# Patient Record
Sex: MALE | Race: BLACK OR AFRICAN AMERICAN | Employment: OTHER | URBAN - METROPOLITAN AREA
[De-identification: names, ages, dates, MRNs, and addresses within clinical notes are randomized per-mention and may not be internally consistent; named-entity substitution may affect disease eponyms.]

---

## 2017-01-25 RX ORDER — OLMESARTAN/HYDROCHLOROTHIAZIDE 40-12.5 MG
TABLET ORAL
Qty: 90 TABLET | Refills: 3 | Status: SHIPPED | OUTPATIENT
Start: 2017-01-25 | End: 2017-07-17 | Stop reason: SDUPTHER

## 2017-02-16 ENCOUNTER — HOSPITAL ENCOUNTER (OUTPATIENT)
Dept: NON INVASIVE DIAGNOSTICS | Age: 67
Discharge: HOME | End: 2017-02-16
Attending: INTERNAL MEDICINE | Admitting: INTERNAL MEDICINE

## 2017-02-16 DIAGNOSIS — I25.10 ATHEROSCLEROTIC HEART DISEASE OF NATIVE CORONARY ARTERY WITHOUT ANGINA PECTORIS: ICD-10-CM

## 2017-03-01 ENCOUNTER — OFFICE VISIT (OUTPATIENT)
Dept: CARDIOLOGY CLINIC | Age: 67
End: 2017-03-01

## 2017-03-01 VITALS
DIASTOLIC BLOOD PRESSURE: 78 MMHG | OXYGEN SATURATION: 98 % | HEIGHT: 73 IN | HEART RATE: 76 BPM | SYSTOLIC BLOOD PRESSURE: 138 MMHG | WEIGHT: 277 LBS | BODY MASS INDEX: 36.71 KG/M2

## 2017-03-01 DIAGNOSIS — I10 HTN (HYPERTENSION), BENIGN: ICD-10-CM

## 2017-03-01 DIAGNOSIS — I25.10 CORONARY ARTERY DISEASE INVOLVING NATIVE CORONARY ARTERY OF NATIVE HEART WITHOUT ANGINA PECTORIS: Primary | ICD-10-CM

## 2017-03-01 DIAGNOSIS — E78.5 HYPERLIPIDEMIA WITH TARGET LDL LESS THAN 70: ICD-10-CM

## 2017-03-01 PROCEDURE — G8427 DOCREV CUR MEDS BY ELIG CLIN: HCPCS | Performed by: INTERNAL MEDICINE

## 2017-03-01 PROCEDURE — G8598 ASA/ANTIPLAT THER USED: HCPCS | Performed by: INTERNAL MEDICINE

## 2017-03-01 PROCEDURE — 99214 OFFICE O/P EST MOD 30 MIN: CPT | Performed by: INTERNAL MEDICINE

## 2017-03-01 PROCEDURE — 1123F ACP DISCUSS/DSCN MKR DOCD: CPT | Performed by: INTERNAL MEDICINE

## 2017-03-01 PROCEDURE — G8484 FLU IMMUNIZE NO ADMIN: HCPCS | Performed by: INTERNAL MEDICINE

## 2017-03-01 PROCEDURE — 3017F COLORECTAL CA SCREEN DOC REV: CPT | Performed by: INTERNAL MEDICINE

## 2017-03-01 PROCEDURE — 1036F TOBACCO NON-USER: CPT | Performed by: INTERNAL MEDICINE

## 2017-03-01 PROCEDURE — G8417 CALC BMI ABV UP PARAM F/U: HCPCS | Performed by: INTERNAL MEDICINE

## 2017-03-01 PROCEDURE — 4040F PNEUMOC VAC/ADMIN/RCVD: CPT | Performed by: INTERNAL MEDICINE

## 2017-03-01 RX ORDER — ATORVASTATIN CALCIUM 20 MG/1
TABLET, FILM COATED ORAL
Qty: 90 TABLET | Refills: 3 | Status: SHIPPED | OUTPATIENT
Start: 2017-03-01 | End: 2017-04-24 | Stop reason: SDUPTHER

## 2017-03-01 RX ORDER — OLMESARTAN MEDOXOMIL AND HYDROCHLOROTHIAZIDE 40/12.5 40; 12.5 MG/1; MG/1
TABLET ORAL
Qty: 90 TABLET | Refills: 3 | Status: SHIPPED | OUTPATIENT
Start: 2017-03-01 | End: 2017-07-26 | Stop reason: SDUPTHER

## 2017-03-01 RX ORDER — CARVEDILOL 25 MG/1
TABLET ORAL
Qty: 180 TABLET | Refills: 3 | Status: SHIPPED | OUTPATIENT
Start: 2017-03-01 | End: 2017-04-24 | Stop reason: SDUPTHER

## 2017-03-01 RX ORDER — AMLODIPINE BESYLATE 10 MG/1
TABLET ORAL
Qty: 90 TABLET | Refills: 3 | Status: SHIPPED | OUTPATIENT
Start: 2017-03-01 | End: 2018-04-11 | Stop reason: SDUPTHER

## 2017-04-24 RX ORDER — CARVEDILOL 25 MG/1
TABLET ORAL
Qty: 180 TABLET | Refills: 3 | Status: SHIPPED | OUTPATIENT
Start: 2017-04-24 | End: 2018-04-11 | Stop reason: SDUPTHER

## 2017-04-24 RX ORDER — ATORVASTATIN CALCIUM 20 MG/1
TABLET, FILM COATED ORAL
Qty: 90 TABLET | Refills: 3 | Status: SHIPPED | OUTPATIENT
Start: 2017-04-24 | End: 2017-08-09

## 2017-07-17 ENCOUNTER — OFFICE VISIT (OUTPATIENT)
Dept: FAMILY MEDICINE CLINIC | Age: 67
End: 2017-07-17

## 2017-07-17 VITALS
OXYGEN SATURATION: 98 % | RESPIRATION RATE: 16 BRPM | DIASTOLIC BLOOD PRESSURE: 68 MMHG | HEART RATE: 84 BPM | HEIGHT: 73 IN | BODY MASS INDEX: 36.66 KG/M2 | WEIGHT: 276.6 LBS | SYSTOLIC BLOOD PRESSURE: 114 MMHG | TEMPERATURE: 99.5 F

## 2017-07-17 DIAGNOSIS — J01.10 ACUTE NON-RECURRENT FRONTAL SINUSITIS: Primary | ICD-10-CM

## 2017-07-17 PROCEDURE — 3017F COLORECTAL CA SCREEN DOC REV: CPT | Performed by: FAMILY MEDICINE

## 2017-07-17 PROCEDURE — 1123F ACP DISCUSS/DSCN MKR DOCD: CPT | Performed by: FAMILY MEDICINE

## 2017-07-17 PROCEDURE — G8417 CALC BMI ABV UP PARAM F/U: HCPCS | Performed by: FAMILY MEDICINE

## 2017-07-17 PROCEDURE — G8598 ASA/ANTIPLAT THER USED: HCPCS | Performed by: FAMILY MEDICINE

## 2017-07-17 PROCEDURE — G8427 DOCREV CUR MEDS BY ELIG CLIN: HCPCS | Performed by: FAMILY MEDICINE

## 2017-07-17 PROCEDURE — 99214 OFFICE O/P EST MOD 30 MIN: CPT | Performed by: FAMILY MEDICINE

## 2017-07-17 PROCEDURE — 1036F TOBACCO NON-USER: CPT | Performed by: FAMILY MEDICINE

## 2017-07-17 PROCEDURE — 4040F PNEUMOC VAC/ADMIN/RCVD: CPT | Performed by: FAMILY MEDICINE

## 2017-07-17 RX ORDER — AMOXICILLIN AND CLAVULANATE POTASSIUM 875; 125 MG/1; MG/1
1 TABLET, FILM COATED ORAL 2 TIMES DAILY
Qty: 20 TABLET | Refills: 0 | Status: SHIPPED | OUTPATIENT
Start: 2017-07-17 | End: 2017-07-27

## 2017-07-17 ASSESSMENT — ENCOUNTER SYMPTOMS
HEMOPTYSIS: 0
RHINORRHEA: 1
WHEEZING: 0
COUGH: 1
HEARTBURN: 0
SHORTNESS OF BREATH: 0
SORE THROAT: 1

## 2017-07-17 ASSESSMENT — PATIENT HEALTH QUESTIONNAIRE - PHQ9
SUM OF ALL RESPONSES TO PHQ9 QUESTIONS 1 & 2: 0
1. LITTLE INTEREST OR PLEASURE IN DOING THINGS: 0
SUM OF ALL RESPONSES TO PHQ QUESTIONS 1-9: 0
2. FEELING DOWN, DEPRESSED OR HOPELESS: 0

## 2017-07-26 ENCOUNTER — TELEPHONE (OUTPATIENT)
Dept: CARDIOLOGY CLINIC | Age: 67
End: 2017-07-26

## 2017-07-26 RX ORDER — OLMESARTAN MEDOXOMIL AND HYDROCHLOROTHIAZIDE 40/12.5 40; 12.5 MG/1; MG/1
TABLET ORAL
Qty: 90 TABLET | Refills: 3 | Status: SHIPPED | OUTPATIENT
Start: 2017-07-26 | End: 2018-04-11 | Stop reason: SDUPTHER

## 2017-08-24 ENCOUNTER — HOSPITAL ENCOUNTER (OUTPATIENT)
Dept: ENDOSCOPY | Age: 67
Discharge: HOME | End: 2017-08-24
Attending: INTERNAL MEDICINE | Admitting: INTERNAL MEDICINE

## 2017-08-24 VITALS
HEART RATE: 66 BPM | OXYGEN SATURATION: 100 % | SYSTOLIC BLOOD PRESSURE: 131 MMHG | DIASTOLIC BLOOD PRESSURE: 60 MMHG | RESPIRATION RATE: 16 BRPM | WEIGHT: 273.38 LBS | BODY MASS INDEX: 36.23 KG/M2 | HEIGHT: 73 IN | TEMPERATURE: 97 F

## 2017-08-24 LAB
GLUCOSE BLD-MCNC: 107 MG/DL (ref 70–99)
PERFORMED ON: ABNORMAL

## 2017-08-24 RX ORDER — ATORVASTATIN CALCIUM 20 MG/1
20 TABLET, FILM COATED ORAL DAILY
COMMUNITY
End: 2018-05-18 | Stop reason: SDUPTHER

## 2017-08-24 RX ORDER — SODIUM CHLORIDE 9 MG/ML
INJECTION, SOLUTION INTRAVENOUS CONTINUOUS
Status: DISCONTINUED | OUTPATIENT
Start: 2017-08-24 | End: 2017-08-25 | Stop reason: HOSPADM

## 2017-08-24 RX ADMIN — SODIUM CHLORIDE: 9 INJECTION, SOLUTION INTRAVENOUS at 08:06

## 2017-08-24 ASSESSMENT — PAIN SCALES - GENERAL
PAINLEVEL_OUTOF10: 0

## 2017-08-24 ASSESSMENT — PAIN - FUNCTIONAL ASSESSMENT: PAIN_FUNCTIONAL_ASSESSMENT: 0-10

## 2017-09-12 ENCOUNTER — SURG/PROC ORDERS (OUTPATIENT)
Dept: ANESTHESIOLOGY | Age: 67
End: 2017-09-12

## 2017-09-12 RX ORDER — SODIUM CHLORIDE 0.9 % (FLUSH) 0.9 %
10 SYRINGE (ML) INJECTION EVERY 12 HOURS SCHEDULED
Status: CANCELLED | OUTPATIENT
Start: 2017-09-12

## 2017-09-12 RX ORDER — SODIUM CHLORIDE 9 MG/ML
INJECTION, SOLUTION INTRAVENOUS CONTINUOUS
Status: CANCELLED | OUTPATIENT
Start: 2017-09-12

## 2017-09-12 RX ORDER — SODIUM CHLORIDE 0.9 % (FLUSH) 0.9 %
10 SYRINGE (ML) INJECTION PRN
Status: CANCELLED | OUTPATIENT
Start: 2017-09-12

## 2017-09-14 ENCOUNTER — HOSPITAL ENCOUNTER (OUTPATIENT)
Dept: ENDOSCOPY | Age: 67
Discharge: HOME | End: 2017-09-14
Attending: INTERNAL MEDICINE | Admitting: INTERNAL MEDICINE

## 2017-09-14 VITALS
DIASTOLIC BLOOD PRESSURE: 87 MMHG | HEIGHT: 73 IN | WEIGHT: 273 LBS | OXYGEN SATURATION: 100 % | SYSTOLIC BLOOD PRESSURE: 147 MMHG | TEMPERATURE: 97.4 F | HEART RATE: 73 BPM | BODY MASS INDEX: 36.18 KG/M2 | RESPIRATION RATE: 16 BRPM

## 2017-09-14 LAB
GLUCOSE BLD-MCNC: 95 MG/DL (ref 70–99)
PERFORMED ON: NORMAL

## 2017-09-14 RX ORDER — SODIUM CHLORIDE 9 MG/ML
INJECTION, SOLUTION INTRAVENOUS CONTINUOUS
Status: DISCONTINUED | OUTPATIENT
Start: 2017-09-14 | End: 2017-09-15 | Stop reason: HOSPADM

## 2017-09-14 RX ORDER — SODIUM CHLORIDE 0.9 % (FLUSH) 0.9 %
10 SYRINGE (ML) INJECTION PRN
Status: DISCONTINUED | OUTPATIENT
Start: 2017-09-14 | End: 2017-09-15 | Stop reason: HOSPADM

## 2017-09-14 RX ORDER — SODIUM CHLORIDE 0.9 % (FLUSH) 0.9 %
10 SYRINGE (ML) INJECTION EVERY 12 HOURS SCHEDULED
Status: DISCONTINUED | OUTPATIENT
Start: 2017-09-14 | End: 2017-09-15 | Stop reason: HOSPADM

## 2017-09-14 ASSESSMENT — PAIN - FUNCTIONAL ASSESSMENT: PAIN_FUNCTIONAL_ASSESSMENT: 0-10

## 2017-09-29 ENCOUNTER — OFFICE VISIT (OUTPATIENT)
Dept: FAMILY MEDICINE CLINIC | Age: 67
End: 2017-09-29

## 2017-09-29 VITALS
HEIGHT: 73 IN | HEART RATE: 88 BPM | BODY MASS INDEX: 36.37 KG/M2 | SYSTOLIC BLOOD PRESSURE: 162 MMHG | DIASTOLIC BLOOD PRESSURE: 86 MMHG | WEIGHT: 274.4 LBS | RESPIRATION RATE: 16 BRPM

## 2017-09-29 DIAGNOSIS — E55.9 VITAMIN D DEFICIENCY: ICD-10-CM

## 2017-09-29 DIAGNOSIS — R79.89 LOW TESTOSTERONE: Chronic | ICD-10-CM

## 2017-09-29 DIAGNOSIS — D50.8 IRON DEFICIENCY ANEMIA SECONDARY TO INADEQUATE DIETARY IRON INTAKE: ICD-10-CM

## 2017-09-29 DIAGNOSIS — Z23 NEED FOR INFLUENZA VACCINATION: ICD-10-CM

## 2017-09-29 DIAGNOSIS — Z51.81 MEDICATION MONITORING ENCOUNTER: ICD-10-CM

## 2017-09-29 DIAGNOSIS — I25.10 CORONARY ARTERY DISEASE INVOLVING NATIVE CORONARY ARTERY OF NATIVE HEART WITHOUT ANGINA PECTORIS: Chronic | ICD-10-CM

## 2017-09-29 DIAGNOSIS — I10 HTN (HYPERTENSION), BENIGN: Chronic | ICD-10-CM

## 2017-09-29 DIAGNOSIS — R97.20 PSA ELEVATION: Chronic | ICD-10-CM

## 2017-09-29 DIAGNOSIS — Z23 NEED FOR PROPHYLACTIC VACCINATION AGAINST STREPTOCOCCUS PNEUMONIAE (PNEUMOCOCCUS): ICD-10-CM

## 2017-09-29 DIAGNOSIS — R79.83 HOMOCYSTEINEMIA: Chronic | ICD-10-CM

## 2017-09-29 DIAGNOSIS — E78.5 HYPERLIPIDEMIA WITH TARGET LDL LESS THAN 70: Chronic | ICD-10-CM

## 2017-09-29 DIAGNOSIS — E11.21 CONTROLLED TYPE 2 DIABETES MELLITUS WITH MICROALBUMINURIC DIABETIC NEPHROPATHY (HCC): Primary | ICD-10-CM

## 2017-09-29 LAB
CREATININE URINE: 105.8 MG/DL (ref 39–259)
HBA1C MFR BLD: 6.2 %
MICROALBUMIN UR-MCNC: 11.9 MG/DL
MICROALBUMIN/CREAT UR-RTO: 112.5 MG/G (ref 0–30)

## 2017-09-29 PROCEDURE — 1123F ACP DISCUSS/DSCN MKR DOCD: CPT | Performed by: FAMILY MEDICINE

## 2017-09-29 PROCEDURE — G0008 ADMIN INFLUENZA VIRUS VAC: HCPCS | Performed by: FAMILY MEDICINE

## 2017-09-29 PROCEDURE — 3017F COLORECTAL CA SCREEN DOC REV: CPT | Performed by: FAMILY MEDICINE

## 2017-09-29 PROCEDURE — 90662 IIV NO PRSV INCREASED AG IM: CPT | Performed by: FAMILY MEDICINE

## 2017-09-29 PROCEDURE — 3046F HEMOGLOBIN A1C LEVEL >9.0%: CPT | Performed by: FAMILY MEDICINE

## 2017-09-29 PROCEDURE — G8427 DOCREV CUR MEDS BY ELIG CLIN: HCPCS | Performed by: FAMILY MEDICINE

## 2017-09-29 PROCEDURE — 1036F TOBACCO NON-USER: CPT | Performed by: FAMILY MEDICINE

## 2017-09-29 PROCEDURE — G0009 ADMIN PNEUMOCOCCAL VACCINE: HCPCS | Performed by: FAMILY MEDICINE

## 2017-09-29 PROCEDURE — G8598 ASA/ANTIPLAT THER USED: HCPCS | Performed by: FAMILY MEDICINE

## 2017-09-29 PROCEDURE — 4040F PNEUMOC VAC/ADMIN/RCVD: CPT | Performed by: FAMILY MEDICINE

## 2017-09-29 PROCEDURE — 99215 OFFICE O/P EST HI 40 MIN: CPT | Performed by: FAMILY MEDICINE

## 2017-09-29 PROCEDURE — G8417 CALC BMI ABV UP PARAM F/U: HCPCS | Performed by: FAMILY MEDICINE

## 2017-09-29 PROCEDURE — 90670 PCV13 VACCINE IM: CPT | Performed by: FAMILY MEDICINE

## 2017-09-29 PROCEDURE — 83036 HEMOGLOBIN GLYCOSYLATED A1C: CPT | Performed by: FAMILY MEDICINE

## 2017-09-29 ASSESSMENT — ENCOUNTER SYMPTOMS
CHEST TIGHTNESS: 0
WHEEZING: 0
SHORTNESS OF BREATH: 0
COUGH: 0
CHOKING: 0

## 2017-09-29 NOTE — MR AVS SNAPSHOT
After Visit Summary             Colleen Penaloza   2017 1:00 PM   Office Visit    Description:  Male : 1950   Provider:  Hay Roman DO   Department:  [address] and Future Appointments         Below is a list of your follow-up and future appointments. This may not be a complete list as you may have made appointments directly with providers that we are not aware of or your providers may have made some for you. Please call your providers to confirm appointments. It is important to keep your appointments. Please bring your current insurance card, photo ID, co-pay, and all medication bottles to your appointment. If self-pay, payment is expected at the time of service. Your To-Do List     Future Orders Complete By Expires    CBC Auto Differential [FJC5620 Custom]  10/7/2017 (Approximate) 2018    Comprehensive Metabolic Panel [FHT98 Custom]  10/7/2017 (Approximate) 2018    Homocysteine, Serum [LAB93 Custom]  10/7/2017 (Approximate) 2018    Lipid Panel [LAB18 Custom]  10/7/2017 (Approximate) 2018    Magnesium [KDI495 Custom]  10/7/2017 (Approximate) 2018    PSA, Total and Free [IHD901 Custom]  10/7/2017 (Approximate) 2018    Uric Acid [UWB316 Custom]  10/7/2017 (Approximate) 2018    Vitamin D 25 Hydroxy [HOG725 Custom]  10/7/2017 (Approximate) 2018    Microalbumin / creatinine urine ratio [PFW445 Custom]  10/29/2017 2018    Follow-Up    Return in about 3 months (around 2017) for Diabetes, Hypertension.          Information from Your Visit        Department     Name Address Phone Fax    [phone] [address] [phone]      You Were Seen for:         Comments    Type 2 diabetes mellitus without complication, without long-term current use of insulin ([facility]   [[id number]]         Vital Signs Blood Pressure Pulse Respirations Height Weight Body Mass Index    162/86 (Site: Left Arm, Position: Sitting, Cuff Size: Large Adult) 88 16 6' 1\" (1.854 m) 274 lb 6.4 oz (124.5 kg) 36.2 kg/m2    Smoking Status                   Former Smoker           Additional Information about your Body Mass Index (BMI)           Your BMI as listed above is considered obese (30 or more). BMI is an estimate of body fat, calculated from your height and weight. The higher your BMI, the greater your risk of heart disease, high blood pressure, type 2 diabetes, stroke, gallstones, arthritis, sleep apnea, and certain cancers. BMI is not perfect. It may overestimate body fat in athletes and people who are more muscular. Even a small weight loss (between 5 and 10 percent of your current weight) by decreasing your calorie intake and becoming more physically active will help lower your risk of developing or worsening diseases associated with obesity. Learn more at: [employer]          Instructions    Catarino Lovett was seen today for diabetes and other. Diagnoses and all orders for this visit:    Type 2 diabetes mellitus without complication, without long-term current use of insulin ([facility])  -      DIABETES FOOT EXAM  -     Microalbumin / creatinine urine ratio; Future  -     POCT glycosylated hemoglobin (Hb A1C)  -     Lipid Panel; Future  -     Comprehensive Metabolic Panel; Future  -     Good control.  -     Continue meds and lifestyle control.   -     Blood sugar goal is  before a meal.  Less than 180 1-2 hours after a meal.  100-140 at bedtime. HTN (hypertension), benign  -     Comprehensive Metabolic Panel; Future  -     Magnesium; Future  -     Uric Acid; Future  Blood pressure goal for people 75 years and below is 100-119/79 or less. If blood pressure is more than 139/89 for either number then an increase in medicine is indicated.   If you are also diabetic then a blood pressure more 129/79 also means blood pressure medicines should be increased. The doctor should be called if the upper number (systolic blood pressure) is more than 180 once or more than 160 1/3 of the time. Call if the upper number is less than 90 or if less than 100 and the you are light headed when you stand up. Call if the lower number (diastolic blood pressure) is more than 100. A much lower bottom number even in the 40's is not usually urgent. Coronary artery disease involving native coronary artery of native heart without angina pectoris  -     Lipid Panel; Future  -     Comprehensive Metabolic Panel; Future    Hyperlipidemia with target LDL less than 70  -     Lipid Panel; Future  -     Comprehensive Metabolic Panel; Future  -     Homocysteine, Serum; Future  LDL ideal is 69 or less. You can lower your LDL cholesterol by decreasing your saturated fats, eating fewer egg yolks and using egg substitutes, eating smaller portions of red meat and using more skinless poultry and fish to meet your protein needs. Try to get some of your proteins from plant sources such as beans, nuts, peas and / or soy products such as tofu. Adding fruits and vegetables to your diet (a total of 5 servings or more between the two) can help also. Homocysteinemia ([facility] [address]  Recheck. Folic acid 562 mcg 3 pills daily. PSA elevation  Recheck. 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)   urine recheck    Medication monitoring encounter  -     Comprehensive Metabolic Panel;  Future -     Magnesium; Future  -     Uric Acid; Future    Colon  Prep                  Medications and Orders      Your Current Medications Are              atorvastatin (LIPITOR) 20 MG tablet Take 20 mg by mouth daily    olmesartan-hydrochlorothiazide (BENICAR HCT) 40-12.5 MG per tablet TAKE 1 TABLET BY MOUTH ONCE DAILY    carvedilol (COREG) 25 MG tablet TAKE 1 TABLET BY MOUTH TWICE DAILY    amLODIPine (NORVASC) 10 MG tablet TAKE 1 TABLET BY MOUTH ONCE DAILY    ONETOUCH DELICA LANCETS 56S MISC Test blood sugar twice daily. travoprost, benzalkonium, (TRAVATAN) 0.004 % ophthalmic solution Place 1 drop into both eyes nightly. glucose blood VI test strips (ASCENSIA AUTODISC VI;ONE TOUCH ULTRA TEST VI) strip 1 each by In Vitro route 2 times daily. As needed. Lancet Device MISC 1 Device by Does not apply route 2 times daily. Blood Glucose Monitoring Suppl (BLOOD GLUCOSE METER) KIT 1 Device by Does not apply route 2 times daily. aspirin 81 MG tablet Take 81 mg by mouth daily. therapeutic multivitamin-minerals (THERAGRAN-M) tablet Take 1 tablet by mouth daily.       Allergies           No Known Allergies      We Ordered/Performed the following            DIABETES FOOT EXAM     INFLUENZA, HIGH DOSE, 65 YRS +, IM, PF, PREFILL SYR, 0.5ML (FLUZONE HD)     Pneumococcal conjugate vaccine 13-valent PCV13     POCT glycosylated hemoglobin (Hb A1C)          Result Summary for POCT glycosylated hemoglobin (Hb A1C)      Result Information       Status          Normal Final result (Collected: 9/29/2017  2:06 PM)           9/29/2017  2:06 PM      Component Results     Component Value Ref Range & Units Status    Hemoglobin A1C 6.2 % Final               Additional Information        Basic Information     Date Of Birth Sex Race Ethnicity Preferred Language    1950 Male Black Non-/Non  English      Problem List as of 9/29/2017  Date Reviewed: 7/23/2017 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)    Neurofibromatosis (HCC) (Chronic)    Homocysteinemia (HCC) (Chronic)    ED (erectile dysfunction) (Chronic)    DM type 2 (diabetes mellitus, type 2) (HCC) (Chronic)    Vitamin D deficiency    Sensorineural hearing loss, bilateral (Chronic)    Low testosterone (Chronic)    CAD (coronary artery disease) (Chronic)    HTN (hypertension), benign (Chronic)    Hyperlipidemia with target LDL less than 70 (Chronic)    PSA elevation (Chronic)      Immunizations as of 9/29/2017     Name Date    Influenza Virus Vaccine 11/1/2014    Influenza, High Dose 10/13/2016, 12/3/2015      Preventive Care        Date Due    One-time abdominal aortic aneurism (AAA) screening is recommended for all men between the age of 73-68 who have ever smoked 1950    Hepatitis C screening is recommended for all adults regardless of risk factors born between [facility] at least once (lifetime) who have never been tested. 1950    Tetanus Combination Vaccine (1 - Tdap) 5/31/1969    Colonoscopy 5/31/2000    Pneumococcal Vaccines (two) for all adults aged 72 and over (1 of 2 - PCV13) 5/31/2015    Diabetic Foot Exam 8/18/2015    Eye Exam By An Eye Doctor 2/9/2016    Hemoglobin A1C (Test For Long-Term Glucose Control) 3/17/2017    Yearly Flu Vaccine (1) 9/1/2017    Cholesterol Screening 9/3/2017            Twistt Signup           Our records indicate that you have an active [employer] account. You can view your After Visit Summary by going to [email or web address] and logging in with your [employer] username and password. If you don't have a [employer] username and password but a parent or guardian has access to your record, the parent or guardian should login with their own [employer] username and password and access your record to view the After Visit Summary.      Additional Information

## 2017-09-29 NOTE — PROGRESS NOTES
Subjective:      Patient ID: Saskia Keller is a 79 y.o. male. Chief Complaint   Patient presents with    Diabetes     FOLLOW UP    Other     DISCUSS COLONOSCOPY     HPI  COLONOSCOPY   Had 2 bottle prep he had colonoscopy but was not cleaned out. Different prep next time but had a hard time getting prep down. Had an IV in upper arm. The IV came out and they couldn't get one in after multiple tries. Then they wanted to go into the jugular. He finally said no. He is asking if he should go somewhere else for colonoscopy. DM   -130 with occasional spike to 150. Takes 1 in am and is lower. Higher in evening if after a meal at 160. HTN  Measures BP in spurts. Not adding salt. Eats out 2x/wk. Chips etc 2x/wk. Drinks Gatorade a lot. Current Outpatient Prescriptions on File Prior to Visit   Medication Sig Dispense Refill    atorvastatin (LIPITOR) 20 MG tablet Take 20 mg by mouth daily      olmesartan-hydrochlorothiazide (BENICAR HCT) 40-12.5 MG per tablet TAKE 1 TABLET BY MOUTH ONCE DAILY 90 tablet 3    carvedilol (COREG) 25 MG tablet TAKE 1 TABLET BY MOUTH TWICE DAILY 180 tablet 3    amLODIPine (NORVASC) 10 MG tablet TAKE 1 TABLET BY MOUTH ONCE DAILY 90 tablet 3    ONETOUCH DELICA LANCETS 39R MISC Test blood sugar twice daily. 3    travoprost, benzalkonium, (TRAVATAN) 0.004 % ophthalmic solution Place 1 drop into both eyes nightly.  glucose blood VI test strips (ASCENSIA AUTODISC VI;ONE TOUCH ULTRA TEST VI) strip 1 each by In Vitro route 2 times daily. As needed. 100 each 3    Lancet Device MISC 1 Device by Does not apply route 2 times daily. 100 Device 3    Blood Glucose Monitoring Suppl (BLOOD GLUCOSE METER) KIT 1 Device by Does not apply route 2 times daily. 1 kit 0    aspirin 81 MG tablet Take 81 mg by mouth daily.  therapeutic multivitamin-minerals (THERAGRAN-M) tablet Take 1 tablet by mouth daily. No current facility-administered medications on file prior to visit. Review of Systems   Respiratory: Negative for cough, choking, chest tightness, shortness of breath and wheezing. Cardiovascular: Negative for chest pain, palpitations and leg swelling. Neurological: Negative for dizziness, light-headedness and headaches. Objective:   Physical Exam   Constitutional: He appears well-developed. No distress. Eyes: Conjunctivae are normal. Right eye exhibits no discharge. Left eye exhibits no discharge. No scleral icterus. Neck: Trachea normal and phonation normal. Neck supple. No JVD present. No tracheal tenderness present. Carotid bruit is not present. No tracheal deviation present. No thyroid mass and no thyromegaly present. Cardiovascular: Normal rate, regular rhythm, S1 normal, S2 normal and normal heart sounds. Exam reveals no gallop, no S3, no S4, no distant heart sounds and no friction rub. No murmur heard. Pulmonary/Chest: Effort normal and breath sounds normal. No stridor. No respiratory distress. He has no decreased breath sounds. He has no wheezes. He has no rhonchi. He has no rales. Musculoskeletal:        Feet:    Foot sensory:  Right  5/10  (absent first and fifth toes medial forefoot, medial and middle heel - patient has a right thigh neurofibroma which affects feeling in his feet), left  10/10 ( ). DP 3/5 bilateral.  PT 3/5 bilateral.  Callus: Mild both plantar heels, bilateral medial first MTP, left fifth lateral corner. Sores:  None. Onychomycosis:  None. Tinea pedis: Right plantar. Deformity: None     Lymphadenopathy:        Head (right side): No submandibular and no tonsillar adenopathy present. Head (left side): No submandibular and no tonsillar adenopathy present. He has no cervical adenopathy. Right cervical: No superficial cervical, no deep cervical and no posterior cervical adenopathy present. Left cervical: No superficial cervical, no deep cervical and no posterior cervical adenopathy present. Appointment Time     Claus Lopez was seen today for diabetes and other. Diagnoses and all orders for this visit:    Type 2 diabetes mellitus without complication, without long-term current use of insulin ([facility])  -      DIABETES FOOT EXAM  -     Microalbumin / creatinine urine ratio; Future  -     POCT glycosylated hemoglobin (Hb A1C)  -     Lipid Panel; Future  -     Comprehensive Metabolic Panel; Future  -     Good control.  -     Continue meds and lifestyle control.   -     Blood sugar goal is  before a meal.  Less than 180 1-2 hours after a meal.  100-140 at bedtime. HTN (hypertension), benign  -     Comprehensive Metabolic Panel; Future  -     Magnesium; Future  -     Uric Acid; Future  Blood pressure goal for people 75 years and below is 100-119/79 or less. If blood pressure is more than 139/89 for either number then an increase in medicine is indicated. If you are also diabetic then a blood pressure more 129/79 also means blood pressure medicines should be increased. The doctor should be called if the upper number (systolic blood pressure) is more than 180 once or more than 160 1/3 of the time. Call if the upper number is less than 90 or if less than 100 and the you are light headed when you stand up. Call if the lower number (diastolic blood pressure) is more than 100. A much lower bottom number even in the 40's is not usually urgent. Coronary artery disease involving native coronary artery of native heart without angina pectoris  -     Lipid Panel; Future  -     Comprehensive Metabolic Panel; Future    Hyperlipidemia with target LDL less than 70  -     Lipid Panel; Future  -     Comprehensive Metabolic Panel; Future  -     Homocysteine, Serum; Future  LDL ideal is 69 or less.  You can lower your LDL cholesterol by decreasing your saturated fats, eating fewer egg yolks and using egg substitutes, eating smaller portions of red meat and using more skinless poultry and fish to meet your protein

## 2017-10-07 PROBLEM — E11.21 DIABETES MELLITUS WITH MICROALBUMINURIC DIABETIC NEPHROPATHY (HCC): Chronic | Status: ACTIVE | Noted: 2017-09-29

## 2017-12-13 NOTE — PROGRESS NOTES
[employer] MESSAGE SENT TO PT, PT DUE FOR BLOOD WORK.   [facility]

## 2017-12-13 NOTE — PROGRESS NOTES
[employer] MESSAGE SENT TO PT, PT DUE FOR BLOOD WORK.   [facility]

## 2017-12-13 NOTE — PROGRESS NOTES
[employer] MESSAGE SENT TO PT, PT DUE FOR BLOOD WORK.   [facility]

## 2017-12-13 NOTE — PROGRESS NOTES
[employer] MESSAGE SENT TO PT, PT DUE FOR BLOOD WORK.   [facility]

## 2018-04-11 ENCOUNTER — OFFICE VISIT (OUTPATIENT)
Dept: CARDIOLOGY CLINIC | Age: 68
End: 2018-04-11

## 2018-04-11 VITALS
SYSTOLIC BLOOD PRESSURE: 140 MMHG | HEIGHT: 73 IN | WEIGHT: 282 LBS | BODY MASS INDEX: 37.37 KG/M2 | HEART RATE: 70 BPM | DIASTOLIC BLOOD PRESSURE: 70 MMHG

## 2018-04-11 DIAGNOSIS — I25.10 CORONARY ARTERY DISEASE INVOLVING NATIVE CORONARY ARTERY OF NATIVE HEART WITHOUT ANGINA PECTORIS: Primary | Chronic | ICD-10-CM

## 2018-04-11 DIAGNOSIS — E78.5 HYPERLIPIDEMIA WITH TARGET LDL LESS THAN 70: Chronic | ICD-10-CM

## 2018-04-11 DIAGNOSIS — I10 HTN (HYPERTENSION), BENIGN: Chronic | ICD-10-CM

## 2018-04-11 PROCEDURE — G8598 ASA/ANTIPLAT THER USED: HCPCS | Performed by: INTERNAL MEDICINE

## 2018-04-11 PROCEDURE — G8417 CALC BMI ABV UP PARAM F/U: HCPCS | Performed by: INTERNAL MEDICINE

## 2018-04-11 PROCEDURE — 4040F PNEUMOC VAC/ADMIN/RCVD: CPT | Performed by: INTERNAL MEDICINE

## 2018-04-11 PROCEDURE — 1036F TOBACCO NON-USER: CPT | Performed by: INTERNAL MEDICINE

## 2018-04-11 PROCEDURE — G8427 DOCREV CUR MEDS BY ELIG CLIN: HCPCS | Performed by: INTERNAL MEDICINE

## 2018-04-11 PROCEDURE — 99214 OFFICE O/P EST MOD 30 MIN: CPT | Performed by: INTERNAL MEDICINE

## 2018-04-11 PROCEDURE — 1123F ACP DISCUSS/DSCN MKR DOCD: CPT | Performed by: INTERNAL MEDICINE

## 2018-04-11 PROCEDURE — 3017F COLORECTAL CA SCREEN DOC REV: CPT | Performed by: INTERNAL MEDICINE

## 2018-04-11 RX ORDER — DOXAZOSIN MESYLATE 4 MG/1
4 TABLET ORAL NIGHTLY
Qty: 90 TABLET | Refills: 3 | Status: SHIPPED | OUTPATIENT
Start: 2018-04-11 | End: 2018-06-06 | Stop reason: SDUPTHER

## 2018-04-11 RX ORDER — OLMESARTAN MEDOXOMIL AND HYDROCHLOROTHIAZIDE 40/12.5 40; 12.5 MG/1; MG/1
TABLET ORAL
Qty: 90 TABLET | Refills: 3 | Status: SHIPPED | OUTPATIENT
Start: 2018-04-11 | End: 2019-02-12

## 2018-04-11 RX ORDER — DOXAZOSIN MESYLATE 4 MG/1
4 TABLET ORAL NIGHTLY
Qty: 90 TABLET | Refills: 3 | Status: SHIPPED | OUTPATIENT
Start: 2018-04-11 | End: 2018-04-11 | Stop reason: SDUPTHER

## 2018-04-11 RX ORDER — AMLODIPINE BESYLATE 10 MG/1
TABLET ORAL
Qty: 90 TABLET | Refills: 3 | Status: SHIPPED | OUTPATIENT
Start: 2018-04-11 | End: 2019-05-24 | Stop reason: SDUPTHER

## 2018-04-11 RX ORDER — CARVEDILOL 25 MG/1
TABLET ORAL
Qty: 180 TABLET | Refills: 3 | Status: SHIPPED | OUTPATIENT
Start: 2018-04-11 | End: 2018-05-07 | Stop reason: SDUPTHER

## 2018-05-07 RX ORDER — CARVEDILOL 25 MG/1
TABLET ORAL
Qty: 180 TABLET | Refills: 3 | Status: SHIPPED | OUTPATIENT
Start: 2018-05-07 | End: 2019-05-24 | Stop reason: SDUPTHER

## 2018-05-16 ENCOUNTER — HOSPITAL ENCOUNTER (OUTPATIENT)
Dept: OTHER | Age: 68
Discharge: HOME | End: 2018-05-16
Attending: INTERNAL MEDICINE | Admitting: INTERNAL MEDICINE

## 2018-05-16 DIAGNOSIS — E78.5 HYPERLIPIDEMIA WITH TARGET LDL LESS THAN 70: Chronic | ICD-10-CM

## 2018-05-16 LAB
A/G RATIO: 1.5 (ref 1.1–2.2)
ALBUMIN SERPL-MCNC: 4.4 G/DL (ref 3.4–5)
ALP BLD-CCNC: 45 U/L (ref 40–129)
ALT SERPL-CCNC: 18 U/L (ref 10–40)
ANION GAP SERPL CALCULATED.3IONS-SCNC: 14 MMOL/L (ref 3–16)
AST SERPL-CCNC: 16 U/L (ref 15–37)
BILIRUB SERPL-MCNC: 1 MG/DL (ref 0–1)
BUN BLDV-MCNC: 13 MG/DL (ref 7–20)
CALCIUM SERPL-MCNC: 9.5 MG/DL (ref 8.3–10.6)
CHLORIDE BLD-SCNC: 104 MMOL/L (ref 99–110)
CHOLESTEROL, TOTAL: 162 MG/DL (ref 0–199)
CO2: 26 MMOL/L (ref 21–32)
CREAT SERPL-MCNC: 1.1 MG/DL (ref 0.8–1.3)
GFR AFRICAN AMERICAN: >60
GFR NON-AFRICAN AMERICAN: >60
GLOBULIN: 3 G/DL
GLUCOSE BLD-MCNC: 114 MG/DL (ref 70–99)
HDLC SERPL-MCNC: 50 MG/DL (ref 40–60)
LDL CHOLESTEROL CALCULATED: 90 MG/DL
POTASSIUM SERPL-SCNC: 3.9 MMOL/L (ref 3.5–5.1)
SODIUM BLD-SCNC: 144 MMOL/L (ref 136–145)
TOTAL PROTEIN: 7.4 G/DL (ref 6.4–8.2)
TRIGL SERPL-MCNC: 110 MG/DL (ref 0–150)
VLDLC SERPL CALC-MCNC: 22 MG/DL

## 2018-05-18 RX ORDER — ATORVASTATIN CALCIUM 20 MG/1
20 TABLET, FILM COATED ORAL DAILY
Qty: 30 TABLET | Refills: 11 | Status: SHIPPED | OUTPATIENT
Start: 2018-05-18 | End: 2019-05-02 | Stop reason: SDUPTHER

## 2018-06-06 ENCOUNTER — TELEPHONE (OUTPATIENT)
Dept: CARDIOLOGY CLINIC | Age: 68
End: 2018-06-06

## 2018-06-06 RX ORDER — DOXAZOSIN 8 MG/1
8 TABLET ORAL NIGHTLY
Qty: 90 TABLET | Refills: 3
Start: 2018-06-06 | End: 2018-06-27 | Stop reason: SDUPTHER

## 2018-06-08 ENCOUNTER — OFFICE VISIT (OUTPATIENT)
Dept: CARDIOLOGY CLINIC | Age: 68
End: 2018-06-08

## 2018-06-08 VITALS
DIASTOLIC BLOOD PRESSURE: 88 MMHG | SYSTOLIC BLOOD PRESSURE: 132 MMHG | BODY MASS INDEX: 37.47 KG/M2 | HEART RATE: 68 BPM | WEIGHT: 284 LBS | OXYGEN SATURATION: 98 %

## 2018-06-08 DIAGNOSIS — E11.9 TYPE 2 DIABETES MELLITUS WITHOUT COMPLICATION, WITHOUT LONG-TERM CURRENT USE OF INSULIN (HCC): ICD-10-CM

## 2018-06-08 DIAGNOSIS — I10 HTN (HYPERTENSION), BENIGN: Primary | Chronic | ICD-10-CM

## 2018-06-08 DIAGNOSIS — E78.5 HYPERLIPIDEMIA WITH TARGET LDL LESS THAN 70: Chronic | ICD-10-CM

## 2018-06-08 DIAGNOSIS — I25.10 CORONARY ARTERY DISEASE INVOLVING NATIVE CORONARY ARTERY OF NATIVE HEART WITHOUT ANGINA PECTORIS: Chronic | ICD-10-CM

## 2018-06-08 PROCEDURE — 3017F COLORECTAL CA SCREEN DOC REV: CPT | Performed by: NURSE PRACTITIONER

## 2018-06-08 PROCEDURE — G8417 CALC BMI ABV UP PARAM F/U: HCPCS | Performed by: NURSE PRACTITIONER

## 2018-06-08 PROCEDURE — 2022F DILAT RTA XM EVC RTNOPTHY: CPT | Performed by: NURSE PRACTITIONER

## 2018-06-08 PROCEDURE — 1036F TOBACCO NON-USER: CPT | Performed by: NURSE PRACTITIONER

## 2018-06-08 PROCEDURE — 1123F ACP DISCUSS/DSCN MKR DOCD: CPT | Performed by: NURSE PRACTITIONER

## 2018-06-08 PROCEDURE — G8598 ASA/ANTIPLAT THER USED: HCPCS | Performed by: NURSE PRACTITIONER

## 2018-06-08 PROCEDURE — 4040F PNEUMOC VAC/ADMIN/RCVD: CPT | Performed by: NURSE PRACTITIONER

## 2018-06-08 PROCEDURE — 99214 OFFICE O/P EST MOD 30 MIN: CPT | Performed by: NURSE PRACTITIONER

## 2018-06-08 PROCEDURE — G8427 DOCREV CUR MEDS BY ELIG CLIN: HCPCS | Performed by: NURSE PRACTITIONER

## 2018-06-08 PROCEDURE — 3046F HEMOGLOBIN A1C LEVEL >9.0%: CPT | Performed by: NURSE PRACTITIONER

## 2018-06-27 RX ORDER — DOXAZOSIN 8 MG/1
8 TABLET ORAL NIGHTLY
Qty: 90 TABLET | Refills: 3 | Status: SHIPPED | OUTPATIENT
Start: 2018-06-27 | End: 2019-07-19 | Stop reason: SDUPTHER

## 2018-07-09 ENCOUNTER — OFFICE VISIT (OUTPATIENT)
Dept: FAMILY MEDICINE CLINIC | Age: 68
End: 2018-07-09

## 2018-07-09 VITALS
HEIGHT: 73 IN | HEART RATE: 60 BPM | WEIGHT: 284.8 LBS | DIASTOLIC BLOOD PRESSURE: 80 MMHG | OXYGEN SATURATION: 98 % | SYSTOLIC BLOOD PRESSURE: 136 MMHG | RESPIRATION RATE: 20 BRPM | BODY MASS INDEX: 37.74 KG/M2

## 2018-07-09 DIAGNOSIS — R60.0 EDEMA OF BOTH LEGS: Primary | ICD-10-CM

## 2018-07-09 DIAGNOSIS — I10 HTN (HYPERTENSION), BENIGN: Chronic | ICD-10-CM

## 2018-07-09 DIAGNOSIS — R79.83 HOMOCYSTEINEMIA: ICD-10-CM

## 2018-07-09 DIAGNOSIS — Z12.11 ENCOUNTER FOR SCREENING COLONOSCOPY: ICD-10-CM

## 2018-07-09 DIAGNOSIS — E11.21 DIABETES MELLITUS WITH MICROALBUMINURIC DIABETIC NEPHROPATHY (HCC): ICD-10-CM

## 2018-07-09 DIAGNOSIS — E11.9 TYPE 2 DIABETES MELLITUS WITHOUT COMPLICATION, WITHOUT LONG-TERM CURRENT USE OF INSULIN (HCC): Chronic | ICD-10-CM

## 2018-07-09 PROCEDURE — 3017F COLORECTAL CA SCREEN DOC REV: CPT | Performed by: FAMILY MEDICINE

## 2018-07-09 PROCEDURE — 4040F PNEUMOC VAC/ADMIN/RCVD: CPT | Performed by: FAMILY MEDICINE

## 2018-07-09 PROCEDURE — 2022F DILAT RTA XM EVC RTNOPTHY: CPT | Performed by: FAMILY MEDICINE

## 2018-07-09 PROCEDURE — 99215 OFFICE O/P EST HI 40 MIN: CPT | Performed by: FAMILY MEDICINE

## 2018-07-09 PROCEDURE — 1123F ACP DISCUSS/DSCN MKR DOCD: CPT | Performed by: FAMILY MEDICINE

## 2018-07-09 PROCEDURE — 1101F PT FALLS ASSESS-DOCD LE1/YR: CPT | Performed by: FAMILY MEDICINE

## 2018-07-09 PROCEDURE — G8598 ASA/ANTIPLAT THER USED: HCPCS | Performed by: FAMILY MEDICINE

## 2018-07-09 PROCEDURE — G8427 DOCREV CUR MEDS BY ELIG CLIN: HCPCS | Performed by: FAMILY MEDICINE

## 2018-07-09 PROCEDURE — 1036F TOBACCO NON-USER: CPT | Performed by: FAMILY MEDICINE

## 2018-07-09 PROCEDURE — 3046F HEMOGLOBIN A1C LEVEL >9.0%: CPT | Performed by: FAMILY MEDICINE

## 2018-07-09 PROCEDURE — G8417 CALC BMI ABV UP PARAM F/U: HCPCS | Performed by: FAMILY MEDICINE

## 2018-07-09 ASSESSMENT — PATIENT HEALTH QUESTIONNAIRE - PHQ9
SUM OF ALL RESPONSES TO PHQ9 QUESTIONS 1 & 2: 0
SUM OF ALL RESPONSES TO PHQ QUESTIONS 1-9: 0
1. LITTLE INTEREST OR PLEASURE IN DOING THINGS: 0
2. FEELING DOWN, DEPRESSED OR HOPELESS: 0

## 2018-07-09 ASSESSMENT — ENCOUNTER SYMPTOMS
CHOKING: 0
WHEEZING: 0
SHORTNESS OF BREATH: 1
COUGH: 0
APNEA: 1
CHEST TIGHTNESS: 0

## 2018-07-09 NOTE — PROGRESS NOTES
Neurological: He is alert. Reflex Scores:       Patellar reflexes are 2+ on the right side and 2+ on the left side. Skin: Skin is warm and dry. He is not diaphoretic. No pallor. Psychiatric: He has a normal mood and affect. His speech is normal and behavior is normal. Judgment normal.   Nursing note and vitals reviewed. Vitals:    07/09/18 0856   BP: 136/80   Site: Right Arm   Position: Sitting   Cuff Size: Large Adult   Pulse: 60   Resp: 20   SpO2: 98%   Weight: 284 lb 12.8 oz (129.2 kg)  Comment: EDUAR SHOES   Height: 6' 1\" (1.854 m)     BP Readings from Last 3 Encounters:   07/09/18 136/80   06/08/18 132/88   04/11/18 (!) 140/70     Pulse Readings from Last 3 Encounters:   07/09/18 60   06/08/18 68   04/11/18 70     Wt Readings from Last 3 Encounters:   07/09/18 284 lb 12.8 oz (129.2 kg)   06/08/18 284 lb (128.8 kg)   04/11/18 282 lb (127.9 kg)     Body mass index is 37.57 kg/m². 5/16/2018 07:53   Sodium 144   Potassium 3.9   Chloride 104   CO2 26   BUN 13   Creatinine 1.1   Anion Gap 14   GFR African American >60   Glucose 114 (H)   Calcium 9.5   Total Protein 7.4   Cholesterol, Total 162   HDL Cholesterol 50   LDL Calculated 90   Triglycerides 110   VLDL Cholesterol Calculated 22   Albumin 4.4   Globulin 3.0   Albumin/Globulin Ratio 1.5   Alk Phos 45   ALT 18   AST 16   Bilirubin 1.0     Assessment:      1. Edema of both legs    2. Type 2 diabetes mellitus without complication, without long-term current use of insulin ([address]    3. Diabetes mellitus with microalbuminuric diabetic nephropathy ([address]    4. Homocysteinemia ([address]    5. HTN (hypertension), benign    6. Encounter for screening colonoscopy          Plan:      - Counseling More Than 50% of the 40 min Appointment Time     Shea Conway was seen today for joint swelling. Diagnoses and all orders for this visit:    Edema of both legs  Avoid all salt. Keep leg elevated when sitting. Wear compression stockings as much as possible.   If feet are down

## 2018-07-09 NOTE — PATIENT INSTRUCTIONS
Skylar Cowart was seen today for joint swelling. Diagnoses and all orders for this visit:    Edema of both legs  Avoid all salt. Keep leg elevated when sitting. Wear compression stockings as much as possible. If feet are down while sitting press toes and front of foot down then lift them firmly with heel on the ground. When standing gently rock from heels to standing on toe tips. These are calf pumps that move fluid out of the calf and back to the central circulation and prevent worsened swelling in the foot and calf. Take Amlodipine at dinner or bedtime. Type 2 diabetes mellitus without complication, without long-term current use of insulin ([facility])  Schedule to discuss. Diabetes mellitus with microalbuminuric diabetic nephropathy ([facility]  9/29/18 due for test.    Homocysteinemia ([facility]  Your homocysteine is high because you are not absorbing or processing enough folic acid - a B vitamin. Lowering homocysteine was associated with a 25% decrease in strokes in the HOPE 2 trial,  lowers risk of progression to Alzheimer's dementia and blood clots and improves bone health. Please start folic acid 980 mcg per day. HTN (hypertension), benign  Blood pressure goal for people 75 years and below is 100-119/79 or less. If you are also diabetic then a blood pressure more 129/79 also means blood pressure medicines should be increased. The doctor should be called if the upper number (systolic blood pressure) is more than 180 once or more than 160 1/3 of the time. Call if the upper number is less than 90 or if less than 100 and the you are light headed when you stand up. Call if the lower number (diastolic blood pressure) is more than 100. A much lower bottom number even in the 40's is not usually urgent. BP is likely the cause of the microalbuminuria.     Encounter for screening colonoscopy  -     Ambulatory referral to Colonoscopy    Patient Education   Low Sodium Diet (2,000 Milligram): Care Instructions  Your are getting. · Buy fresh vegetables, or frozen vegetables without added sauces. Buy low-sodium versions of canned vegetables, soups, and other canned goods. Prepare low-sodium meals  · Cut back on the amount of salt you use in cooking. This will help you adjust to the taste. Do not add salt after cooking. One teaspoon of salt has about 2,300 mg of sodium. · Take the salt shaker off the table. · Flavor your food with garlic, lemon juice, onion, vinegar, herbs, and spices. Do not use soy sauce, lite soy sauce, steak sauce, onion salt, garlic salt, celery salt, mustard, or ketchup on your food. · Use low-sodium salad dressings, sauces, and ketchup. Or make your own salad dressings and sauces without adding salt. · Use less salt (or none) when recipes call for it. You can often use half the salt a recipe calls for without losing flavor. Other foods such as rice, pasta, and grains do not need added salt. · Rinse canned vegetables, and cook them in fresh water. This removes some-but not all-of the salt. · Avoid water that is naturally high in sodium or that has been treated with water softeners, which add sodium. Call your local water company to find out the sodium content of your water supply. If you buy bottled water, read the label and choose a sodium-free brand. Avoid high-sodium foods  · Avoid eating:  ¨ Smoked, cured, salted, and canned meat, fish, and poultry. ¨ Ham, jackson, hot dogs, and luncheon meats. ¨ Regular, hard, and processed cheese and regular peanut butter. ¨ Crackers with salted tops, and other salted snack foods such as pretzels, chips, and salted popcorn. ¨ Frozen prepared meals, unless labeled low-sodium. ¨ Canned and dried soups, broths, and bouillon, unless labeled sodium-free or low-sodium. ¨ Canned vegetables, unless labeled sodium-free or low-sodium. ¨ Western Kathy fries, pizza, tacos, and other fast foods.   ¨ Pickles, olives, ketchup, and other condiments, especially soy sauce, unless

## 2018-07-17 ENCOUNTER — TELEPHONE (OUTPATIENT)
Dept: SURGERY | Age: 68
End: 2018-07-17

## 2018-07-17 NOTE — TELEPHONE ENCOUNTER
Spoke with patient. Colonoscopy scheduled. Dr. Emma Mai  Location: [facility]  Date: 8/3/2018  Time: 11:30 a.m. Arrival time: 10:00 a.m. Heart/Lung/Kidney: Hx of CAD  Blood thinners: aspirin 81mg  Pharmacy: TBD  Instruction preference: Mail to home address (address verified)  Insurance: Medicare (primary) + [employer] (secondary)    F[facility]ed [facility] order to [facility] Mailed instructions to patient's home address. Waiting to hear from patient about which pharmacy he prefers. No need to call insurance companies. [employer] follows Medicare guidelines.

## 2018-07-18 RX ORDER — SODIUM CHLORIDE 0.9 % (FLUSH) 0.9 %
10 SYRINGE (ML) INJECTION PRN
Status: CANCELLED | OUTPATIENT
Start: 2018-07-18

## 2018-07-18 RX ORDER — SODIUM CHLORIDE 9 MG/ML
INJECTION, SOLUTION INTRAVENOUS CONTINUOUS
Status: CANCELLED | OUTPATIENT
Start: 2018-07-18

## 2018-07-18 RX ORDER — SODIUM CHLORIDE 0.9 % (FLUSH) 0.9 %
10 SYRINGE (ML) INJECTION EVERY 12 HOURS SCHEDULED
Status: CANCELLED | OUTPATIENT
Start: 2018-07-18

## 2018-07-23 DIAGNOSIS — Z12.11 SCREENING FOR COLON CANCER: Primary | ICD-10-CM

## 2018-07-26 NOTE — TELEPHONE ENCOUNTER
[facility] and changed colonoscopy to 8/31/18 at 8:00 a.m. arrival time 6:30 a.m. Marked changes on surgery order and faxed to [facility]. LMOM at [facility] that Rx could be re-shelved since patient's procedure changed to the end of the month. Sent reminder message to myself to send Rx to Dr. Amirah Patrick to sign. (Patient wants it sent to [facility].) Updated instructions and mailed to patient's home address.

## 2018-08-03 ENCOUNTER — HOSPITAL ENCOUNTER (OUTPATIENT)
Dept: ENDOSCOPY | Age: 68
Discharge: HOME | End: 2018-08-04
Attending: SURGERY | Admitting: SURGERY

## 2018-08-20 ENCOUNTER — PATIENT MESSAGE (OUTPATIENT)
Dept: OTHER | Facility: CLINIC | Age: 68
End: 2018-08-20

## 2018-08-21 DIAGNOSIS — Z12.11 SCREENING FOR COLON CANCER: Primary | ICD-10-CM

## 2018-08-29 ENCOUNTER — TELEPHONE (OUTPATIENT)
Dept: SURGERY | Age: 68
End: 2018-08-29

## 2018-08-31 ENCOUNTER — HOSPITAL ENCOUNTER (OUTPATIENT)
Dept: ENDOSCOPY | Age: 68
Discharge: HOME | End: 2018-08-31
Admitting: SURGERY

## 2018-08-31 VITALS
SYSTOLIC BLOOD PRESSURE: 153 MMHG | RESPIRATION RATE: 15 BRPM | HEART RATE: 69 BPM | BODY MASS INDEX: 36.45 KG/M2 | DIASTOLIC BLOOD PRESSURE: 87 MMHG | OXYGEN SATURATION: 100 % | WEIGHT: 275 LBS | TEMPERATURE: 97 F | HEIGHT: 73 IN

## 2018-08-31 LAB
GLUCOSE BLD-MCNC: 107 MG/DL (ref 70–99)
GLUCOSE BLD-MCNC: 98 MG/DL (ref 70–99)
PERFORMED ON: ABNORMAL
PERFORMED ON: NORMAL

## 2018-08-31 PROCEDURE — 45385 COLONOSCOPY W/LESION REMOVAL: CPT | Performed by: SURGERY

## 2018-08-31 RX ORDER — SODIUM CHLORIDE 9 MG/ML
INJECTION, SOLUTION INTRAVENOUS CONTINUOUS
Status: DISCONTINUED | OUTPATIENT
Start: 2018-08-31 | End: 2018-09-01 | Stop reason: HOSPADM

## 2018-08-31 RX ORDER — SODIUM CHLORIDE 0.9 % (FLUSH) 0.9 %
10 SYRINGE (ML) INJECTION PRN
Status: DISCONTINUED | OUTPATIENT
Start: 2018-08-31 | End: 2018-09-01 | Stop reason: HOSPADM

## 2018-08-31 RX ORDER — SODIUM CHLORIDE 0.9 % (FLUSH) 0.9 %
10 SYRINGE (ML) INJECTION EVERY 12 HOURS SCHEDULED
Status: DISCONTINUED | OUTPATIENT
Start: 2018-08-31 | End: 2018-09-01 | Stop reason: HOSPADM

## 2018-08-31 RX ADMIN — SODIUM CHLORIDE: 9 INJECTION, SOLUTION INTRAVENOUS at 07:37

## 2018-08-31 ASSESSMENT — PAIN - FUNCTIONAL ASSESSMENT: PAIN_FUNCTIONAL_ASSESSMENT: 0-10

## 2018-08-31 NOTE — OP NOTE
COLONOSCOPY PROCEDURE NOTE    Kimberly Cordero  1950  [id number]    FACILITY: [facility]    DATE OF PROCEDURE: 08/31/18    SURGEON: Falguni Leon MD    PRE-OPERATIVE DIAGNOSIS: Screening for colorectal cancer    POST-OPERATIVE DIAGNOSIS:    1. Polyp sigmoid colon   2. Diverticulosis, moderate, diffuse    PROCEDURE:   1. Colonoscopy to cecum with hot snare polypectomy    ANESTHESIA: Sedation care provided by CRNA/anesthesiologist    INDICATIONS:  Referred by PCP for screening colonoscopy     Previous colonoscopy: attempted in past but poor prep; attempted again but unable to get IV access  Family history of colorectal cancer: no  Symptoms: no    Risks of the procedure were explained to the patient. Risks include, but are not limited to: bleeding, perforation, post polypectomy syndrome, splenic injury, missed polyps or masses, incomplete exam, and risks of anesthesia/sedation. PROCEDURE DETAILS:  The patient was placed in the left lateral position. Appropriate monitors were put in place per endoscopy/anesthesia protocol. Time out was performed confirming the correct patient/procedure. Antibiotics not indicated. Moderate to deep sedation was started by anesthesia provider. Digital rectum exam performed. Well lubricated Olympus flexible colonoscope inserted transanally and advanced under direct luminal visualization to the cecum using air insufflation. Cecal landmarks identified, including the ileocecal valve and appendiceal orifice. Photodocumentation of cecum was obtained. The Ileocecal valve was not intubated. The colonoscope was withdrawn, observing mucosa for abnormalities. Retroflexion of the distal rectum was performed. Withdrawal time was 10 minutes. FINDINGS:    DIGITAL RECTAL EXAM: normal    TERMINAL ILEUM: not intubated    COLORECTUM:   1. Polyp in sigmoid colon, pedunculated, ~8 mm, completely excised using hot snare polypectomy, retrieved for path. Good hemostasis noted     2.  Diffuse

## 2018-08-31 NOTE — PROGRESS NOTES
Discharge instructions reviewed with patient/wife. All home medications have been reviewed, questions answered and patient verbalized understanding. Discharge instructions signed. Pt dc'd per wheelchair. Patient discharged home with belongings. Wife taking stable pt home.

## 2018-08-31 NOTE — ANESTHESIA PRE-OP
Problem List   Diagnosis Code    CAD (coronary artery disease) I25.10    HTN (hypertension), benign I10    Hyperlipidemia with target LDL less than 70 E78.5    ED (erectile dysfunction) N52.9    DM type 2 (diabetes mellitus, type 2) ([facility] [address] E11.9    Vitamin D deficiency E55.9    Homocysteinemia ([address] E72.19    PSA elevation R97.20    Neurofibromatosis ([facility] [address] Q85.00    Low testosterone R79.89    Sensorineural hearing loss, bilateral H90.3    Diabetes mellitus with microalbuminuric diabetic nephropathy ([address] E11.21       Past Medical History:        Diagnosis Date    CAD (coronary artery disease) 7/9/2004    Diabetes mellitus with microalbuminuric diabetic nephropathy ([address] 05/01/2015    157    DM type 2 (diabetes mellitus, type 2) ([address] 7/7/2014    ED (erectile dysfunction)     Epistaxis 01/01/2012    cautry    Homocysteinemia ([facility] [address] 09/24/2014    13    HTN (hypertension), benign 1/1/2001    Hypercholesterolemia 7/12/2004    Hyperlipidemia LDL goal < 70 7/12/2004    Low testosterone 05/17/2013    204    MI (myocardial infarction) ([address] 07/09/2004    with 2 stents RCA prox and distal    Neurofibromatosis ([facility] [address]     right leg lateral neurofibroma    ANTONIETTA (obstructive sleep apnea) 06/19/2013    CPAP      PSA elevation 1/1/2000    No Bx    Sensorineural hearing loss, bilateral 7/3/2013    right > left    Vitamin D deficiency 01/27/2014    26       Past Surgical History:        Procedure Laterality Date    CARDIAC SURGERY  2004    STENTS    COLONOSCOPY  01/01/2006    NO POLYPS    CORONARY ANGIOPLASTY WITH STENT PLACEMENT  7/12/2004    2 stents in RCA    LEG SURGERY Left ~2001    [facility]  Lat calf & lat     NASAL HEMORRHAGE CONTROL  1/1/2012       Social History:    Social History   Substance Use Topics    Smoking status: Former Smoker     Packs/day: 0.00     Years: 30.00     Types: Pipe     Quit date: 1/1/1976    Smokeless tobacco: Never Used      Comment: 30-33 year 2 bowls /day    Alcohol use 0.0 oz/week      Comment: Wine occasionally ie wedding/business event. Was drinking mid 34's 9-8 scotch 1 yr. Counseling given: Not Answered      Vital Signs (Current): There were no vitals filed for this visit. BP Readings from Last 3 Encounters:   07/09/18 136/80   06/08/18 132/88   04/11/18 (!) 140/70       NPO Status:                                                                                 BMI:   Wt Readings from Last 3 Encounters:   07/09/18 284 lb 12.8 oz (129.2 kg)   06/08/18 284 lb (128.8 kg)   04/11/18 282 lb (127.9 kg)     There is no height or weight on file to calculate BMI. Anesthesia Evaluation  Patient summary reviewed and Nursing notes reviewed no history of anesthetic complications:   Airway: Mallampati: III        Dental:    (+) upper dentures      Pulmonary:Negative Pulmonary ROS and normal exam                               Cardiovascular:  Exercise tolerance: good (>4 METS),   (+) hypertension:, past MI:, CAD:, hyperlipidemia      ECG reviewed  Rhythm: regular      Stress test reviewed             ROS comment: Nl ST. Neuro/Psych:   (+) neuromuscular disease:,             GI/Hepatic/Renal:             Endo/Other:    (+) DiabetesType II DM, , .                 Abdominal:   (+) obese,         Vascular:                                        Anesthesia Plan      TIVA     ASA 3       Induction: intravenous. Anesthetic plan and risks discussed with patient. Plan discussed with CRNA. MEDICATIONS:  Current Outpatient Prescriptions   Medication Sig Dispense Refill    polyethylene glycol (GOLYTELY) 236 g solution Please follow prep instructions provided by doctor's office. 4000 mL 0    polyethylene glycol (GOLYTELY) 236 g solution Please follow prep instructions provided by doctor's office.  4000 mL 0    doxazosin (CARDURA) 8 MG tablet Take 1 tablet by mouth nightly 90 tablet 3    atorvastatin (LIPITOR) 20 MG tablet Take 1 tablet by mouth daily 30 tablet 11    carvedilol (COREG) 25 MG tablet TAKE 1 TABLET BY MOUTH TWICE DAILY 180 tablet 3    olmesartan-hydrochlorothiazide (BENICAR HCT) 40-12.5 MG per tablet TAKE 1 TABLET BY MOUTH ONCE DAILY 90 tablet 3    amLODIPine (NORVASC) 10 MG tablet TAKE 1 TABLET BY MOUTH ONCE DAILY 90 tablet 3    ONETOUCH DELICA LANCETS 14W MISC Test blood sugar twice daily. 3    travoprost, benzalkonium, (TRAVATAN) 0.004 % ophthalmic solution Place 1 drop into both eyes nightly.  glucose blood VI test strips (ASCENSIA AUTODISC VI;ONE TOUCH ULTRA TEST VI) strip 1 each by In Vitro route 2 times daily. As needed. 100 each 3    Lancet Device MISC 1 Device by Does not apply route 2 times daily. 100 Device 3    Blood Glucose Monitoring Suppl (BLOOD GLUCOSE METER) KIT 1 Device by Does not apply route 2 times daily. 1 kit 0    aspirin 81 MG tablet Take 81 mg by mouth daily.  therapeutic multivitamin-minerals (THERAGRAN-M) tablet Take 1 tablet by mouth daily.        Current Facility-Administered Medications   Medication Dose Route Frequency Provider Last Rate Last Dose    0.9 % sodium chloride infusion   Intravenous Continuous Stefanie Edward MD        sodium chloride flush 0.9 % injection 10 mL  10 mL Intravenous 2 times per day Stefanie Edward MD        sodium chloride flush 0.9 % injection 10 mL  10 mL Intravenous PRN Stefanie Edward MD            LABS:  Lab Results   Component Value Date    WBC 10.1 12/14/2009    HGB 12.1 (L) 12/14/2009    HCT 37.0 (L) 12/14/2009    MCV 81.4 12/14/2009     12/14/2009    GLUCOSE 114 (H) 05/16/2018    PROTIME 12.1 12/13/2009    INR 1.10 12/13/2009    APTT 26.7 12/13/2009       Lab Results   Component Value Date     05/16/2018    K 3.9 05/16/2018     05/16/2018    CO2 26 05/16/2018    BUN 13 05/16/2018    CREATININE 1.1 05/16/2018       Problem List:  Patient Active Problem List   Diagnosis    CAD (coronary artery

## 2018-09-04 ENCOUNTER — TELEPHONE (OUTPATIENT)
Dept: SURGERY | Age: 68
End: 2018-09-04

## 2018-09-04 NOTE — TELEPHONE ENCOUNTER
Spoke to him on phone. Only a small amount of bleeding per rectum. Told him to stop ASA for now and let me know if bleeding stops within next few days. Also discussed path with him  - tubular adenoma - will need next C-scope in 5 yrs. Discussed if he starts having severe increasing bleeding to go to nearest ER.

## 2018-09-18 ENCOUNTER — OFFICE VISIT (OUTPATIENT)
Dept: CARDIOLOGY CLINIC | Age: 68
End: 2018-09-18

## 2018-09-18 VITALS
HEART RATE: 73 BPM | BODY MASS INDEX: 37.93 KG/M2 | WEIGHT: 286.2 LBS | DIASTOLIC BLOOD PRESSURE: 86 MMHG | OXYGEN SATURATION: 98 % | SYSTOLIC BLOOD PRESSURE: 132 MMHG | HEIGHT: 73 IN

## 2018-09-18 DIAGNOSIS — I10 HTN (HYPERTENSION), BENIGN: Chronic | ICD-10-CM

## 2018-09-18 DIAGNOSIS — E78.5 HYPERLIPIDEMIA WITH TARGET LDL LESS THAN 70: Chronic | ICD-10-CM

## 2018-09-18 DIAGNOSIS — I25.10 CORONARY ARTERY DISEASE INVOLVING NATIVE CORONARY ARTERY OF NATIVE HEART WITHOUT ANGINA PECTORIS: Primary | Chronic | ICD-10-CM

## 2018-09-18 PROCEDURE — G8417 CALC BMI ABV UP PARAM F/U: HCPCS | Performed by: INTERNAL MEDICINE

## 2018-09-18 PROCEDURE — G8598 ASA/ANTIPLAT THER USED: HCPCS | Performed by: INTERNAL MEDICINE

## 2018-09-18 PROCEDURE — G8427 DOCREV CUR MEDS BY ELIG CLIN: HCPCS | Performed by: INTERNAL MEDICINE

## 2018-09-18 PROCEDURE — 1101F PT FALLS ASSESS-DOCD LE1/YR: CPT | Performed by: INTERNAL MEDICINE

## 2018-09-18 PROCEDURE — 1123F ACP DISCUSS/DSCN MKR DOCD: CPT | Performed by: INTERNAL MEDICINE

## 2018-09-18 PROCEDURE — 99214 OFFICE O/P EST MOD 30 MIN: CPT | Performed by: INTERNAL MEDICINE

## 2018-09-18 PROCEDURE — 4040F PNEUMOC VAC/ADMIN/RCVD: CPT | Performed by: INTERNAL MEDICINE

## 2018-09-18 PROCEDURE — 1036F TOBACCO NON-USER: CPT | Performed by: INTERNAL MEDICINE

## 2018-09-18 PROCEDURE — 3017F COLORECTAL CA SCREEN DOC REV: CPT | Performed by: INTERNAL MEDICINE

## 2018-09-18 NOTE — PROGRESS NOTES
[address]   Cardiac Evaluation      Patient: Arjun Sandoval  YOB: 1950  Date: 9/18/18       Chief Complaint   Patient presents with    Coronary Artery Disease    Hyperlipidemia    Hypertension    6 Month Follow-Up    Shortness of Breath     GENTILE - new    Edema     resolved        Referring provider: Brandy Zuleta DO    History of Present Illness:   Mr Jaspreet Burrows is seen today in follow up. He is a previous patient of Dr Miya Alberts. He has a h/o CAD, HTN, ANTONIETTA, and hyperlipidemia. 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 Castillo Patter denies any chest pain, palpitations, GENTILE, dizziness, or edema. He is physically active, walks for exercise and started back at [employer] yesterday. He has been monitoring his blood pressure at home w/ better readings in the afternoon. Past Medical History:   has a past medical history of CAD (coronary artery disease); Diabetes mellitus with microalbuminuric diabetic nephropathy ([facility] [address] [facility] DM type 2 (diabetes mellitus, type 2) ([facility] [address] ED (erectile dysfunction); Epistaxis; Homocysteinemia ([address] HTN (hypertension), benign; Hypercholesterolemia; Hyperlipidemia LDL goal < 70; Low testosterone; MI (myocardial infarction) ([address] Neurofibromatosis ([facility] [address] [facility] ANTONIETTA (obstructive sleep apnea); PSA elevation; Sensorineural hearing loss, bilateral; and Vitamin D deficiency. Surgical History:   has a past surgical history that includes Coronary angioplasty with stent (7/12/2004); nasal hemorrhage control (1/1/2012); Leg Surgery (Left, ~2001); Cardiac surgery (2004); Colonoscopy (01/01/2006); and Colonoscopy (N/A, 08/31/2018).      Current Outpatient Prescriptions   Medication Sig Dispense Refill    doxazosin (CARDURA) 8 MG tablet Take 1 tablet by mouth nightly 90 tablet 3    atorvastatin (LIPITOR) 20 MG tablet Take 1 tablet by mouth daily 30 tablet 11    carvedilol (COREG) 25 MG tablet TAKE 1 TABLET BY MOUTH TWICE DAILY 180 tablet 3    impulses not displaced  · Heart is regular rate and rhythm with normal S1, S2  · PMI is normal  · The carotid upstroke is normal, no bruit noted   · JVP is not elevated  · Peripheral pulses are symmetrical  · There is no clubbing, cyanosis of the extremities  · Trace edema BLE  · No varicosities  · Femoral Arteries: 2+ and equal without bruits  · Pedal Pulses: 2+ and equal   Abdomen:  · No masses or tenderness  · Aorta not palpable  · Normal bowel sounds  Neurological/Psychiatric:  · Alert and oriented x3  · Moves all extremities well  · Exhibits normal gait balance and coordination      Assessment/Plan  1. Coronary artery disease involving native coronary artery of native heart without angina pectoris -stable  Stress echo 2/17: EF 60%. No regional wall motion abnormalities are seen. moderate concentric left ventricular hypertrophy. E/e\"= 14. Mild mitral regurgitation. Aortic valve appears sclerotic but opens adequately. No stenosis. Mild aortic regurgitation is present. Pressure half-time is calculated at 613 msec. Mild tricuspid regurgitation with RVSP estimated at 28 mmHg  2004 cath-- Taxus stents distal/prox RCA   2. HTN (hypertension), benign -advised to check at noon and night, not in the morning. Will continue meds as they are   3. Hyperlipidemia -stable on labs 5/16/18: ; TRIG 110; HDL 50; LDL 90       PLAN:  Stable cardiac status. Advised to monitor blood pressure at noon and evening. Regular exercise encouraged. FU 4 months. Scribe's attestation: This note was scribed in the presence of Dr Nabeel House MD by Lauren Fuentes RN. The scribe's documentation has been prepared under my direction and personally reviewed by me in its entirety. I confirm that the note above accurately reflects all work, treatment, procedures, and medical decision making performed by me. Thank you for allowing to me to participate in the care of Jeremie Jang.

## 2018-10-08 ENCOUNTER — NURSE ONLY (OUTPATIENT)
Dept: FAMILY MEDICINE CLINIC | Age: 68
End: 2018-10-08
Payer: MEDICARE

## 2018-10-08 DIAGNOSIS — Z23 NEED FOR INFLUENZA VACCINATION: Primary | ICD-10-CM

## 2018-10-08 PROCEDURE — G0008 ADMIN INFLUENZA VIRUS VAC: HCPCS | Performed by: FAMILY MEDICINE

## 2018-10-08 PROCEDURE — 90662 IIV NO PRSV INCREASED AG IM: CPT | Performed by: FAMILY MEDICINE

## 2019-02-12 ENCOUNTER — OFFICE VISIT (OUTPATIENT)
Dept: CARDIOLOGY CLINIC | Age: 69
End: 2019-02-12
Payer: MEDICARE

## 2019-02-12 VITALS
SYSTOLIC BLOOD PRESSURE: 136 MMHG | HEART RATE: 71 BPM | BODY MASS INDEX: 37.11 KG/M2 | WEIGHT: 280 LBS | OXYGEN SATURATION: 97 % | DIASTOLIC BLOOD PRESSURE: 80 MMHG | HEIGHT: 73 IN

## 2019-02-12 DIAGNOSIS — E78.5 HYPERLIPIDEMIA WITH TARGET LDL LESS THAN 70: Primary | Chronic | ICD-10-CM

## 2019-02-12 DIAGNOSIS — I10 HTN (HYPERTENSION), BENIGN: Chronic | ICD-10-CM

## 2019-02-12 DIAGNOSIS — I25.10 CORONARY ARTERY DISEASE INVOLVING NATIVE CORONARY ARTERY OF NATIVE HEART WITHOUT ANGINA PECTORIS: Chronic | ICD-10-CM

## 2019-02-12 PROCEDURE — 4040F PNEUMOC VAC/ADMIN/RCVD: CPT | Performed by: INTERNAL MEDICINE

## 2019-02-12 PROCEDURE — 1036F TOBACCO NON-USER: CPT | Performed by: INTERNAL MEDICINE

## 2019-02-12 PROCEDURE — G8598 ASA/ANTIPLAT THER USED: HCPCS | Performed by: INTERNAL MEDICINE

## 2019-02-12 PROCEDURE — 1123F ACP DISCUSS/DSCN MKR DOCD: CPT | Performed by: INTERNAL MEDICINE

## 2019-02-12 PROCEDURE — 1101F PT FALLS ASSESS-DOCD LE1/YR: CPT | Performed by: INTERNAL MEDICINE

## 2019-02-12 PROCEDURE — G8417 CALC BMI ABV UP PARAM F/U: HCPCS | Performed by: INTERNAL MEDICINE

## 2019-02-12 PROCEDURE — G8482 FLU IMMUNIZE ORDER/ADMIN: HCPCS | Performed by: INTERNAL MEDICINE

## 2019-02-12 PROCEDURE — G8427 DOCREV CUR MEDS BY ELIG CLIN: HCPCS | Performed by: INTERNAL MEDICINE

## 2019-02-12 PROCEDURE — 3017F COLORECTAL CA SCREEN DOC REV: CPT | Performed by: INTERNAL MEDICINE

## 2019-02-12 PROCEDURE — 99214 OFFICE O/P EST MOD 30 MIN: CPT | Performed by: INTERNAL MEDICINE

## 2019-02-12 RX ORDER — OLMESARTAN MEDOXOMIL AND HYDROCHLOROTHIAZIDE 40/12.5 40; 12.5 MG/1; MG/1
TABLET ORAL
Qty: 90 TABLET | Refills: 3 | Status: CANCELLED | OUTPATIENT
Start: 2019-02-12

## 2019-02-12 RX ORDER — VALSARTAN AND HYDROCHLOROTHIAZIDE 320; 12.5 MG/1; MG/1
1 TABLET, FILM COATED ORAL DAILY
Qty: 90 TABLET | Refills: 3 | Status: SHIPPED | OUTPATIENT
Start: 2019-02-12 | End: 2020-02-06

## 2019-05-01 ENCOUNTER — TELEPHONE (OUTPATIENT)
Dept: CARDIOLOGY CLINIC | Age: 69
End: 2019-05-01

## 2019-05-01 DIAGNOSIS — E78.5 HYPERLIPIDEMIA, UNSPECIFIED HYPERLIPIDEMIA TYPE: Primary | ICD-10-CM

## 2019-05-01 NOTE — TELEPHONE ENCOUNTER
Medication Question/Concern    What is the name of the medication you need to speak with someone about? Doseage of the medication:    How are you taking this medication:    What issues/concerns are you having with this medication:                Medication Refill    When was your last appointment with cardiology?  (if 1year or longer, please schedule an appointment)    Medication needing refilled: lipitor    Doseage of the medication: 20 mg     How are you taking this medication (QD, BID, TID, QID, PRN): QD    Patient want a 30 or 90 day supply called in: [facility] are we sending the medication to: [address].  Laila Oswald [phone] Alicia Desir [phone]    Pharmacy Phone number:    Pharmacy Fax number:

## 2019-05-02 RX ORDER — ATORVASTATIN CALCIUM 20 MG/1
20 TABLET, FILM COATED ORAL DAILY
Qty: 30 TABLET | Refills: 1 | Status: SHIPPED | OUTPATIENT
Start: 2019-05-02 | End: 2019-06-04 | Stop reason: SDUPTHER

## 2019-05-16 ENCOUNTER — OFFICE VISIT (OUTPATIENT)
Dept: FAMILY MEDICINE CLINIC | Age: 69
End: 2019-05-16
Payer: MEDICARE

## 2019-05-16 VITALS
HEIGHT: 73 IN | WEIGHT: 284.2 LBS | HEART RATE: 83 BPM | TEMPERATURE: 99.2 F | RESPIRATION RATE: 16 BRPM | BODY MASS INDEX: 37.67 KG/M2 | DIASTOLIC BLOOD PRESSURE: 82 MMHG | SYSTOLIC BLOOD PRESSURE: 130 MMHG | OXYGEN SATURATION: 97 %

## 2019-05-16 DIAGNOSIS — R80.9 MICROALBUMINURIA DUE TO TYPE 2 DIABETES MELLITUS (HCC): ICD-10-CM

## 2019-05-16 DIAGNOSIS — J01.90 ACUTE BACTERIAL SINUSITIS: Primary | ICD-10-CM

## 2019-05-16 DIAGNOSIS — E11.9 TYPE 2 DIABETES MELLITUS WITHOUT COMPLICATION, WITHOUT LONG-TERM CURRENT USE OF INSULIN (HCC): Chronic | ICD-10-CM

## 2019-05-16 DIAGNOSIS — J02.9 SORE THROAT: ICD-10-CM

## 2019-05-16 DIAGNOSIS — I10 HTN (HYPERTENSION), BENIGN: Chronic | ICD-10-CM

## 2019-05-16 DIAGNOSIS — D50.8 ANEMIA, IRON DEFICIENCY, INADEQUATE DIETARY INTAKE: ICD-10-CM

## 2019-05-16 DIAGNOSIS — E78.5 HYPERLIPIDEMIA WITH TARGET LDL LESS THAN 70: Chronic | ICD-10-CM

## 2019-05-16 DIAGNOSIS — B96.89 ACUTE BACTERIAL SINUSITIS: Primary | ICD-10-CM

## 2019-05-16 DIAGNOSIS — Z51.81 MEDICATION MONITORING ENCOUNTER: ICD-10-CM

## 2019-05-16 DIAGNOSIS — E55.9 VITAMIN D DEFICIENCY: ICD-10-CM

## 2019-05-16 DIAGNOSIS — R50.9 FEVER, UNSPECIFIED FEVER CAUSE: ICD-10-CM

## 2019-05-16 DIAGNOSIS — E11.29 MICROALBUMINURIA DUE TO TYPE 2 DIABETES MELLITUS (HCC): ICD-10-CM

## 2019-05-16 DIAGNOSIS — R79.83 HOMOCYSTEINEMIA: Chronic | ICD-10-CM

## 2019-05-16 PROBLEM — K57.30 DIVERTICULOSIS OF COLON: Status: ACTIVE | Noted: 2018-08-31

## 2019-05-16 LAB
HBA1C MFR BLD: 6.4 %
INFLUENZA A ANTIBODY: NEGATIVE
INFLUENZA B ANTIBODY: NEGATIVE
S PYO AG THROAT QL: NORMAL

## 2019-05-16 PROCEDURE — G8417 CALC BMI ABV UP PARAM F/U: HCPCS | Performed by: FAMILY MEDICINE

## 2019-05-16 PROCEDURE — 83036 HEMOGLOBIN GLYCOSYLATED A1C: CPT | Performed by: FAMILY MEDICINE

## 2019-05-16 PROCEDURE — 1036F TOBACCO NON-USER: CPT | Performed by: FAMILY MEDICINE

## 2019-05-16 PROCEDURE — 87804 INFLUENZA ASSAY W/OPTIC: CPT | Performed by: FAMILY MEDICINE

## 2019-05-16 PROCEDURE — 99214 OFFICE O/P EST MOD 30 MIN: CPT | Performed by: FAMILY MEDICINE

## 2019-05-16 PROCEDURE — 87880 STREP A ASSAY W/OPTIC: CPT | Performed by: FAMILY MEDICINE

## 2019-05-16 PROCEDURE — G8598 ASA/ANTIPLAT THER USED: HCPCS | Performed by: FAMILY MEDICINE

## 2019-05-16 PROCEDURE — 4040F PNEUMOC VAC/ADMIN/RCVD: CPT | Performed by: FAMILY MEDICINE

## 2019-05-16 PROCEDURE — 1123F ACP DISCUSS/DSCN MKR DOCD: CPT | Performed by: FAMILY MEDICINE

## 2019-05-16 PROCEDURE — 2022F DILAT RTA XM EVC RTNOPTHY: CPT | Performed by: FAMILY MEDICINE

## 2019-05-16 PROCEDURE — 3044F HG A1C LEVEL LT 7.0%: CPT | Performed by: FAMILY MEDICINE

## 2019-05-16 PROCEDURE — G8427 DOCREV CUR MEDS BY ELIG CLIN: HCPCS | Performed by: FAMILY MEDICINE

## 2019-05-16 PROCEDURE — 3017F COLORECTAL CA SCREEN DOC REV: CPT | Performed by: FAMILY MEDICINE

## 2019-05-16 RX ORDER — SULFAMETHOXAZOLE AND TRIMETHOPRIM 800; 160 MG/1; MG/1
1 TABLET ORAL 2 TIMES DAILY
Qty: 20 TABLET | Refills: 0 | Status: SHIPPED | OUTPATIENT
Start: 2019-05-16 | End: 2019-05-26

## 2019-05-16 SDOH — ECONOMIC STABILITY: FOOD INSECURITY: WITHIN THE PAST 12 MONTHS, THE FOOD YOU BOUGHT JUST DIDN'T LAST AND YOU DIDN'T HAVE MONEY TO GET MORE.: NEVER TRUE

## 2019-05-16 SDOH — ECONOMIC STABILITY: TRANSPORTATION INSECURITY
IN THE PAST 12 MONTHS, HAS LACK OF TRANSPORTATION KEPT YOU FROM MEETINGS, WORK, OR FROM GETTING THINGS NEEDED FOR DAILY LIVING?: NO

## 2019-05-16 SDOH — ECONOMIC STABILITY: INCOME INSECURITY: HOW HARD IS IT FOR YOU TO PAY FOR THE VERY BASICS LIKE FOOD, HOUSING, MEDICAL CARE, AND HEATING?: NOT HARD AT ALL

## 2019-05-16 SDOH — ECONOMIC STABILITY: FOOD INSECURITY: WITHIN THE PAST 12 MONTHS, YOU WORRIED THAT YOUR FOOD WOULD RUN OUT BEFORE YOU GOT MONEY TO BUY MORE.: NEVER TRUE

## 2019-05-16 SDOH — ECONOMIC STABILITY: TRANSPORTATION INSECURITY
IN THE PAST 12 MONTHS, HAS THE LACK OF TRANSPORTATION KEPT YOU FROM MEDICAL APPOINTMENTS OR FROM GETTING MEDICATIONS?: NO

## 2019-05-16 ASSESSMENT — ENCOUNTER SYMPTOMS
COUGH: 1
WHEEZING: 1
HEMOPTYSIS: 0
SORE THROAT: 1
RHINORRHEA: 1
SHORTNESS OF BREATH: 1

## 2019-05-16 ASSESSMENT — PATIENT HEALTH QUESTIONNAIRE - PHQ9
SUM OF ALL RESPONSES TO PHQ9 QUESTIONS 1 & 2: 0
2. FEELING DOWN, DEPRESSED OR HOPELESS: 0
SUM OF ALL RESPONSES TO PHQ QUESTIONS 1-9: 0
1. LITTLE INTEREST OR PLEASURE IN DOING THINGS: 0
SUM OF ALL RESPONSES TO PHQ QUESTIONS 1-9: 0

## 2019-05-16 NOTE — PATIENT INSTRUCTIONS
Niki Nathan was seen today for sinus problem. Diagnoses and all orders for this visit:    Type 2 diabetes mellitus without complication, without long-term current use of insulin (HCC)  -     POCT glycosylated hemoglobin (Hb A1C)    Acute bacterial sinusitis  -     sulfamethoxazole-trimethoprim (BACTRIM DS;SEPTRA DS) 800-160 MG per tablet; Take 1 tablet by mouth 2 times daily for 10 days    Fever, unspecified fever cause  -     POCT Influenza A/B    Sore throat  -     POCT rapid strep A  -     Throat culture; Future  -     Throat culture    Homocysteinemia (HCC)  -     HOMOCYSTEINE, SERUM; Future    Microalbuminuria due to type 2 diabetes mellitus (HCC)  -     Microalbumin / Creatinine Urine Ratio; Future    Hyperlipidemia with target LDL less than 70  -     HOMOCYSTEINE, SERUM; Future                                           Instructions for Respiratory Infections (SAVE THIS SHEET)    For the first 7-14 days of symptoms follow instructions below, even before being seen in the office or even during treatment with antibiotics, until symptom free. 1. Water: Drink 1 ounce of water for every 2 pounds of body weight for adults, 128 Ounces of water per day. This will loosen mucus in the head and chest & improve the weak feeling of dehydration, allow the body to get germ fighting resources to the infection. Half can be juice or sugar free Crystal Light. Don't count drinks with caffeine or carbonation. Infants can have Pedialyte liquid or freezer pops. Avoid salt if you have high Blood Pressure, swelling in the feet or ankles or have heart problems. 2. Humidity: Humidify the air to 35-50% ( or until the windows fog over slightly). Can use a humidifier, vaporizer, boil water on the stove or put a coffee can full of water on the heater vents. This will loosen mucus from infections and allergies. Humidify CPAP. 3. Sleep: Get 8-10 hours a night and rest during the evening after work or school.  If you have trouble sleeping, adults can take Melatonin 5mg up to 2 tabs at bedtime ( not for children or pregnant women). If Mono is suspected then sleep during 9PM to 9AM time span (if possible.)  4. Cough: Take cough medicines with Guaifenesin ( to loosen chest or head congestion) and Dextromethorphan ( to decrease excess cough). Robitussin D.M. Syrup every 4-6 hrs or Mucinex D. M. pills twice a day. Use the pediatric formulations for children over 6 months making sure they are alcohol & sugar free for children, pregnant women, and diabetics. 5. Pain And Fevers: Take Acetaminophen ( Tylenol) for fevers, aches, and headaches. 2-500 mg every 8 hours for adults. Appropriate doses at bedtime for children may help them sleep better. If pregnant take 1 -500 mg (Tylenol) every 8 hours as needed. Ibuprofen may be used if not pregnant, but should be given with food to avoid nausea. Avoid Ibuprofen if you have high blood pressure, CHF, or kidney problems. 6.Gargle: (DAY ONE OF SYMPTOMS) Gargle in the back of the throat with the head tilted back and to the sides with a strong mouthwash  ( Listerine or Scope) after meals and at bedtime at least 4 -5 times a day. This helps kill bacteria and viruses in the back of the throat and will shorten the duration and decrease the severity of your symptoms: sore throat, cough, ear popping,/ear pain, and possibly dizziness. 7. Smoking: Avoid smoking or exposure to second hand smoke. 8. Zinc: (DAY ONE OF SYMPTOMS)  Zinc lozenges such as Cold Lj (available most stores), or Basic ([employer] brand) will help shorten the duration and lessen symptoms such as sore throat, cough, nasal congestion, runny nose, and post nasal drip. Use 1 lozenge every 2-4 hours ( after meals if stomach is sensitive). Children can use 10-15 mg or less 3-4 times a day or Zinc lollypops. In pregnancy limit to 50-60 mg a day for 7 days as prenatals have Zinc also. With diarrhea use zinc pills 50 mg 1/2 to 1 pill 2x/day.   9. Vitamins: Vitamin C 500 mg with breakfast and dinner. Children and pregnant women should drink citrus juices. This speeds healing and strengthens immune system. 10. Chest Symptoms: Vicks Vapor rub to the chest at bedtime. 11. Decongestants: Avoid all decongestants and antihistamine cold preparations in children. Try all of the above starting with day 1 of symptoms. If Strep throat symptoms appear call to be seen in the office as soon as possible and don't gargle on that day. Newborns, infants, or anyone with earaches or influenza may need to be seen quickly. Adults with fevers over 103 degrees or shortness of breath should call the office immediately.

## 2019-05-16 NOTE — PROGRESS NOTES
and no thyromegaly present. Cardiovascular: Normal rate, regular rhythm and normal heart sounds. Exam reveals no gallop and no friction rub. No murmur heard. Pulmonary/Chest: Effort normal and breath sounds normal. No respiratory distress. He has no decreased breath sounds. He has no wheezes. He has no rhonchi. He has no rales. Lymphadenopathy:        Head (right side): No submandibular and no tonsillar adenopathy present. Head (left side): No submandibular and no tonsillar adenopathy present. He has no cervical adenopathy. Right cervical: No superficial cervical, no deep cervical and no posterior cervical adenopathy present. Left cervical: No superficial cervical, no deep cervical and no posterior cervical adenopathy present. Skin: Skin is warm and dry. He is not diaphoretic. No pallor. Vitals:    05/16/19 1839   BP: 130/82   Site: Right Upper Arm   Position: Sitting   Cuff Size: Large Adult   Pulse: 83   Resp: 16   Temp: 99.2 °F (37.3 °C)   TempSrc: Oral   SpO2: 97%   Weight: 284 lb 3.2 oz (128.9 kg)  Comment: shoes on   Height: 6' 1\" (1.854 m)     BP Readings from Last 3 Encounters:   05/16/19 130/82   02/12/19 136/80   09/18/18 132/86     Pulse Readings from Last 3 Encounters:   05/16/19 83   02/12/19 71   09/18/18 73     Wt Readings from Last 3 Encounters:   05/16/19 284 lb 3.2 oz (128.9 kg)   02/12/19 280 lb (127 kg)   09/18/18 286 lb 3.2 oz (129.8 kg)     Body mass index is 37.5 kg/m². 5/16/2018 07:53   Sodium 144   Potassium 3.9   Chloride 104   CO2 26   BUN 13   Creatinine 1.1   Anion Gap 14   GFR African American >60   Glucose 114 (H)   Calcium 9.5   Total Protein 7.4   Cholesterol, Total 162   HDL Cholesterol 50   LDL Calculated 90   Triglycerides 110   VLDL Cholesterol Calculated 22   Albumin 4.4   Globulin 3.0   Albumin/Globulin Ratio 1.5   Alk Phos 45   ALT 18   AST 16   Bilirubin 1.0      Ref.  Range 9/29/2017 15:20   Creatinine, Ur Latest Ref Range: 39.0 - 259.0 mg/dL 105.8   Microalbumin Creatinine Ratio Latest Ref Range: 0.0 - 30.0 mg/g 112.5 (H)   Microalbumin, Random Urine Latest Ref Range: <2.0 mg/dL 11.90 (H)     Results for POC orders placed in visit on 05/16/19   POCT Influenza A/B   Result Value Ref Range    Influenza A Ab negative     Influenza B Ab negative    POCT glycosylated hemoglobin (Hb A1C)   Result Value Ref Range    Hemoglobin A1C 6.4 %   POCT rapid strep A   Result Value Ref Range    Strep A Ag None Detected None Detected     Assessment:      1. Type 2 diabetes mellitus without complication, without long-term current use of insulin ([facility]    2. Acute bacterial sinusitis    3. Fever, unspecified fever cause    4. Sore throat    5. Homocysteinemia ([facility] [address]    6. Microalbuminuria due to type 2 diabetes mellitus ([facility] [address] [facility]    7. Hyperlipidemia with target LDL less than 70          Plan:        [address] [place] [address] was seen today for sinus problem. Diagnoses and all orders for this visit:    Type 2 diabetes mellitus without complication, without long-term current use of insulin (HCC)  -     POCT glycosylated hemoglobin (Hb A1C)    Acute bacterial sinusitis  -     sulfamethoxazole-trimethoprim (BACTRIM DS;SEPTRA DS) 800-160 MG per tablet; Take 1 tablet by mouth 2 times daily for 10 days    Fever, unspecified fever cause  -     POCT Influenza A/B    Sore throat  -     POCT rapid strep A  -     Throat culture; Future  -     Throat culture    Homocysteinemia (HCC)  -     HOMOCYSTEINE, SERUM; Future    Microalbuminuria due to type 2 diabetes mellitus (HCC)  -     Microalbumin / Creatinine Urine Ratio; Future    Hyperlipidemia with target LDL less than 70  -     HOMOCYSTEINE, SERUM; Future    Instructions for Respiratory Infections (SAVE THIS SHEET)  For the first 7-14 days of symptoms follow instructions below, even before being seen in the office or even during treatment with antibiotics, until symptom free.   1. Water: Drink 1 ounce of water for every 2 pounds of body weight times a day. This helps kill bacteria and viruses in the back of the throat and will shorten the duration and decrease the severity of your symptoms: sore throat, cough, ear popping,/ear pain, and possibly dizziness. 7. Smoking: Avoid smoking or exposure to second hand smoke. 8. Zinc: (DAY ONE OF SYMPTOMS)  Zinc lozenges such as Cold Lj (available most stores), or Basic ([employer] brand) will help shorten the duration and lessen symptoms such as sore throat, cough, nasal congestion, runny nose, and post nasal drip. Use 1 lozenge every 2-4 hours ( after meals if stomach is sensitive). Children can use 10-15 mg or less 3-4 times a day or Zinc lollypops. In pregnancy limit to 50-60 mg a day for 7 days as prenatals have Zinc also. With diarrhea use zinc pills 50 mg 1/2 to 1 pill 2x/day. 9. Vitamins: Vitamin C 500 mg with breakfast and dinner. Children and pregnant women should drink citrus juices. This speeds healing and strengthens immune system. 10. Chest Symptoms: Vicks Vapor rub to the chest at bedtime. 11. Decongestants: Avoid all decongestants and antihistamine cold preparations in children. Try all of the above starting with day 1 of symptoms. If Strep throat symptoms appear call to be seen in the office as soon as possible and don't gargle on that day. Newborns, infants, or anyone with earaches or influenza may need to be seen quickly. Adults with fevers over 103 degrees or shortness of breath should call the office immediately.       Sarai Godoy,

## 2019-05-18 LAB — THROAT CULTURE: NORMAL

## 2019-05-24 NOTE — TELEPHONE ENCOUNTER
Lov 2/12/2019  Labs 5/16/2018  Lrf 5/7/2018 Disp 180+3 Coreg 25 mg, Norvasc 10 mg 4/11/2018 Disp 90+3  Appt 8/13/2019      Please advise thanks

## 2019-05-26 PROBLEM — K57.30 DIVERTICULOSIS OF COLON: Chronic | Status: ACTIVE | Noted: 2018-08-31

## 2019-05-26 PROBLEM — K57.30 DIVERTICULOSIS OF LARGE INTESTINE WITHOUT HEMORRHAGE: Status: ACTIVE | Noted: 2018-08-31

## 2019-05-28 RX ORDER — AMLODIPINE BESYLATE 10 MG/1
TABLET ORAL
Qty: 90 TABLET | Refills: 3 | Status: SHIPPED | OUTPATIENT
Start: 2019-05-28 | End: 2019-06-04 | Stop reason: SDUPTHER

## 2019-05-28 RX ORDER — CARVEDILOL 25 MG/1
TABLET ORAL
Qty: 180 TABLET | Refills: 3 | Status: SHIPPED | OUTPATIENT
Start: 2019-05-28 | End: 2019-06-04 | Stop reason: SDUPTHER

## 2019-05-30 ENCOUNTER — HOSPITAL ENCOUNTER (OUTPATIENT)
Age: 69
Discharge: HOME | End: 2019-05-30
Payer: MEDICARE

## 2019-05-30 DIAGNOSIS — E11.29 MICROALBUMINURIA DUE TO TYPE 2 DIABETES MELLITUS (HCC): ICD-10-CM

## 2019-05-30 DIAGNOSIS — R80.9 MICROALBUMINURIA DUE TO TYPE 2 DIABETES MELLITUS (HCC): ICD-10-CM

## 2019-05-30 DIAGNOSIS — I10 HTN (HYPERTENSION), BENIGN: Chronic | ICD-10-CM

## 2019-05-30 DIAGNOSIS — E78.5 HYPERLIPIDEMIA WITH TARGET LDL LESS THAN 70: Chronic | ICD-10-CM

## 2019-05-30 DIAGNOSIS — R79.83 HOMOCYSTEINEMIA: Chronic | ICD-10-CM

## 2019-05-30 DIAGNOSIS — D50.8 ANEMIA, IRON DEFICIENCY, INADEQUATE DIETARY INTAKE: ICD-10-CM

## 2019-05-30 DIAGNOSIS — E11.9 TYPE 2 DIABETES MELLITUS WITHOUT COMPLICATION, WITHOUT LONG-TERM CURRENT USE OF INSULIN (HCC): Chronic | ICD-10-CM

## 2019-05-30 DIAGNOSIS — E55.9 VITAMIN D DEFICIENCY: ICD-10-CM

## 2019-05-30 DIAGNOSIS — Z51.81 MEDICATION MONITORING ENCOUNTER: ICD-10-CM

## 2019-05-30 LAB
A/G RATIO: 1.3 (ref 1.1–2.2)
ALBUMIN SERPL-MCNC: 4 G/DL (ref 3.4–5)
ALP BLD-CCNC: 47 U/L (ref 40–129)
ALT SERPL-CCNC: 20 U/L (ref 10–40)
ANION GAP SERPL CALCULATED.3IONS-SCNC: 12 MMOL/L (ref 3–16)
AST SERPL-CCNC: 21 U/L (ref 15–37)
BASOPHILS ABSOLUTE: 0 K/UL (ref 0–0.2)
BASOPHILS RELATIVE PERCENT: 0.6 %
BILIRUB SERPL-MCNC: 0.5 MG/DL (ref 0–1)
BUN BLDV-MCNC: 11 MG/DL (ref 7–20)
CALCIUM SERPL-MCNC: 9.6 MG/DL (ref 8.3–10.6)
CHLORIDE BLD-SCNC: 108 MMOL/L (ref 99–110)
CHOLESTEROL, TOTAL: 140 MG/DL (ref 0–199)
CO2: 21 MMOL/L (ref 21–32)
CREAT SERPL-MCNC: 1.1 MG/DL (ref 0.8–1.3)
EOSINOPHILS ABSOLUTE: 0.2 K/UL (ref 0–0.6)
EOSINOPHILS RELATIVE PERCENT: 4.3 %
GFR AFRICAN AMERICAN: >60
GFR NON-AFRICAN AMERICAN: >60
GLOBULIN: 3.2 G/DL
GLUCOSE BLD-MCNC: 115 MG/DL (ref 70–99)
HCT VFR BLD CALC: 42.1 % (ref 40.5–52.5)
HDLC SERPL-MCNC: 45 MG/DL (ref 40–60)
HEMOGLOBIN: 13.8 G/DL (ref 13.5–17.5)
HOMOCYSTEINE: 12 UMOL/L (ref 0–10)
LDL CHOLESTEROL CALCULATED: 80 MG/DL
LYMPHOCYTES ABSOLUTE: 1.1 K/UL (ref 1–5.1)
LYMPHOCYTES RELATIVE PERCENT: 23.1 %
MAGNESIUM: 2.4 MG/DL (ref 1.8–2.4)
MCH RBC QN AUTO: 26.9 PG (ref 26–34)
MCHC RBC AUTO-ENTMCNC: 32.9 G/DL (ref 31–36)
MCV RBC AUTO: 81.8 FL (ref 80–100)
MONOCYTES ABSOLUTE: 0.5 K/UL (ref 0–1.3)
MONOCYTES RELATIVE PERCENT: 9.9 %
NEUTROPHILS ABSOLUTE: 3.1 K/UL (ref 1.7–7.7)
NEUTROPHILS RELATIVE PERCENT: 62.1 %
PDW BLD-RTO: 14.9 % (ref 12.4–15.4)
PLATELET # BLD: 157 K/UL (ref 135–450)
PMV BLD AUTO: 9.8 FL (ref 5–10.5)
POTASSIUM SERPL-SCNC: 4.4 MMOL/L (ref 3.5–5.1)
RBC # BLD: 5.14 M/UL (ref 4.2–5.9)
REASON FOR REJECTION: NORMAL
REJECTED TEST: NORMAL
SODIUM BLD-SCNC: 141 MMOL/L (ref 136–145)
TOTAL PROTEIN: 7.2 G/DL (ref 6.4–8.2)
TRIGL SERPL-MCNC: 77 MG/DL (ref 0–150)
VITAMIN D 25-HYDROXY: 33.8 NG/ML
VLDLC SERPL CALC-MCNC: 15 MG/DL
WBC # BLD: 5 K/UL (ref 4–11)

## 2019-05-30 PROCEDURE — 83516 IMMUNOASSAY NONANTIBODY: CPT

## 2019-05-30 PROCEDURE — 83519 RIA NONANTIBODY: CPT

## 2019-05-30 PROCEDURE — 80053 COMPREHEN METABOLIC PANEL: CPT

## 2019-05-30 PROCEDURE — 85025 COMPLETE CBC W/AUTO DIFF WBC: CPT

## 2019-05-30 PROCEDURE — 83090 ASSAY OF HOMOCYSTEINE: CPT

## 2019-05-30 PROCEDURE — 36415 COLL VENOUS BLD VENIPUNCTURE: CPT

## 2019-05-30 PROCEDURE — 82306 VITAMIN D 25 HYDROXY: CPT

## 2019-05-30 PROCEDURE — 80061 LIPID PANEL: CPT

## 2019-05-30 PROCEDURE — 83735 ASSAY OF MAGNESIUM: CPT

## 2019-05-31 LAB
ACETYLCHOLINE BINDING ANTIBODY: 0 NMOL/L (ref 0–0.4)
ACETYLCHOLINE BLOCKING AB: 20 % (ref 0–26)

## 2019-06-01 LAB — ACETYLCHOL MODUL AB: 1 %

## 2019-06-04 RX ORDER — CARVEDILOL 25 MG/1
TABLET ORAL
Qty: 180 TABLET | Refills: 3 | Status: SHIPPED | OUTPATIENT
Start: 2019-06-04 | End: 2020-07-10 | Stop reason: SDUPTHER

## 2019-06-04 RX ORDER — AMLODIPINE BESYLATE 10 MG/1
TABLET ORAL
Qty: 90 TABLET | Refills: 3 | Status: SHIPPED | OUTPATIENT
Start: 2019-06-04 | End: 2020-07-10 | Stop reason: SDUPTHER

## 2019-06-04 RX ORDER — ATORVASTATIN CALCIUM 20 MG/1
20 TABLET, FILM COATED ORAL DAILY
Qty: 30 TABLET | Refills: 1 | Status: SHIPPED | OUTPATIENT
Start: 2019-06-04 | End: 2019-08-13 | Stop reason: SDUPTHER

## 2019-06-11 ENCOUNTER — TELEPHONE (OUTPATIENT)
Dept: FAMILY MEDICINE CLINIC | Age: 69
End: 2019-06-11

## 2019-07-19 RX ORDER — DOXAZOSIN 8 MG/1
8 TABLET ORAL NIGHTLY
Qty: 90 TABLET | Refills: 1 | Status: SHIPPED | OUTPATIENT
Start: 2019-07-19 | End: 2020-01-20 | Stop reason: SDUPTHER

## 2019-08-06 RX ORDER — ATORVASTATIN CALCIUM 20 MG/1
TABLET, FILM COATED ORAL
Qty: 90 TABLET | Refills: 3 | Status: SHIPPED | OUTPATIENT
Start: 2019-08-06 | End: 2019-08-13

## 2019-08-13 ENCOUNTER — OFFICE VISIT (OUTPATIENT)
Dept: CARDIOLOGY CLINIC | Age: 69
End: 2019-08-13
Payer: MEDICARE

## 2019-08-13 ENCOUNTER — TELEPHONE (OUTPATIENT)
Dept: CARDIOLOGY CLINIC | Age: 69
End: 2019-08-13

## 2019-08-13 VITALS
WEIGHT: 288.12 LBS | HEART RATE: 80 BPM | BODY MASS INDEX: 38.19 KG/M2 | HEIGHT: 73 IN | SYSTOLIC BLOOD PRESSURE: 130 MMHG | DIASTOLIC BLOOD PRESSURE: 76 MMHG

## 2019-08-13 DIAGNOSIS — E78.5 HYPERLIPIDEMIA WITH TARGET LDL LESS THAN 70: Primary | Chronic | ICD-10-CM

## 2019-08-13 DIAGNOSIS — I10 HTN (HYPERTENSION), BENIGN: Chronic | ICD-10-CM

## 2019-08-13 DIAGNOSIS — I25.10 CORONARY ARTERY DISEASE INVOLVING NATIVE CORONARY ARTERY OF NATIVE HEART WITHOUT ANGINA PECTORIS: Chronic | ICD-10-CM

## 2019-08-13 PROCEDURE — 99214 OFFICE O/P EST MOD 30 MIN: CPT | Performed by: INTERNAL MEDICINE

## 2019-08-13 PROCEDURE — G8598 ASA/ANTIPLAT THER USED: HCPCS | Performed by: INTERNAL MEDICINE

## 2019-08-13 PROCEDURE — G8427 DOCREV CUR MEDS BY ELIG CLIN: HCPCS | Performed by: INTERNAL MEDICINE

## 2019-08-13 PROCEDURE — 1123F ACP DISCUSS/DSCN MKR DOCD: CPT | Performed by: INTERNAL MEDICINE

## 2019-08-13 PROCEDURE — G8417 CALC BMI ABV UP PARAM F/U: HCPCS | Performed by: INTERNAL MEDICINE

## 2019-08-13 PROCEDURE — 3017F COLORECTAL CA SCREEN DOC REV: CPT | Performed by: INTERNAL MEDICINE

## 2019-08-13 PROCEDURE — 4040F PNEUMOC VAC/ADMIN/RCVD: CPT | Performed by: INTERNAL MEDICINE

## 2019-08-13 PROCEDURE — 1036F TOBACCO NON-USER: CPT | Performed by: INTERNAL MEDICINE

## 2019-08-13 RX ORDER — ATORVASTATIN CALCIUM 20 MG/1
20 TABLET, FILM COATED ORAL DAILY
Qty: 90 TABLET | Refills: 3 | Status: SHIPPED | OUTPATIENT
Start: 2019-08-13 | End: 2020-07-10 | Stop reason: SDUPTHER

## 2019-08-13 NOTE — PROGRESS NOTES
daily 90 tablet 3    ONETOUCH DELICA LANCETS 34U MISC Test blood sugar twice daily. 3    travoprost, benzalkonium, (TRAVATAN) 0.004 % ophthalmic solution Place 1 drop into both eyes nightly.  glucose blood VI test strips (ASCENSIA AUTODISC VI;ONE TOUCH ULTRA TEST VI) strip 1 each by In Vitro route 2 times daily. As needed. 100 each 3    Blood Glucose Monitoring Suppl (BLOOD GLUCOSE METER) KIT 1 Device by Does not apply route 2 times daily. 1 kit 0    aspirin 81 MG tablet Take 81 mg by mouth daily.  therapeutic multivitamin-minerals (THERAGRAN-M) tablet Take 1 tablet by mouth daily. No current facility-administered medications for this visit. Social History:  Social History     Social History    Marital status:      Spouse name: Nita Hale Number of children: 2    Years of education: 25     Occupational History    semi-retired       outside consulting     Social History Main Topics    Smoking status: Former Smoker     Types: Pipe     Quit date: 1/1/1976    Smokeless tobacco: Never Used      Comment: [age] year 2 bowls /day    Alcohol use 0.0 oz/week      Comment: Wine occasionally ie wedding/business event. Was drinking mid 34's 9-8 scotch 1 yr.  Drug use: No      Comment: Tried MJ / hash.  Sexual activity: Not on file     Other Topics Concern    [facility] and appreciates [email or web address]. Social History Narrative    Working 25-30 hours. [employer] 7000 steps. [employer] 3-4x/wk 75 min. 9/29/17. Family History:  family history includes High Blood Pressure in his father and mother; Hypertension in his brother; Gilbert Keira in his father; No Known Problems in his son; Other in his brother; Stroke in his brother. Allergies:  Patient has no known allergies. Review of Systems:   · Constitutional: there has been no unanticipated weight loss.  No change in energy or activity level   · Eyes: No visual changes   · ENT: No Headaches,

## 2019-10-09 ENCOUNTER — NURSE ONLY (OUTPATIENT)
Dept: FAMILY MEDICINE CLINIC | Age: 69
End: 2019-10-09
Payer: MEDICARE

## 2019-10-09 DIAGNOSIS — Z23 NEED FOR IMMUNIZATION AGAINST INFLUENZA: Primary | ICD-10-CM

## 2019-10-09 PROCEDURE — G0008 ADMIN INFLUENZA VIRUS VAC: HCPCS | Performed by: FAMILY MEDICINE

## 2019-10-09 PROCEDURE — 90653 IIV ADJUVANT VACCINE IM: CPT | Performed by: FAMILY MEDICINE

## 2020-01-20 RX ORDER — DOXAZOSIN 8 MG/1
8 TABLET ORAL NIGHTLY
Qty: 90 TABLET | Refills: 2 | Status: SHIPPED | OUTPATIENT
Start: 2020-01-20 | End: 2020-10-22 | Stop reason: SDUPTHER

## 2020-02-06 RX ORDER — VALSARTAN AND HYDROCHLOROTHIAZIDE 320; 12.5 MG/1; MG/1
1 TABLET, FILM COATED ORAL DAILY
Qty: 90 TABLET | Refills: 3 | Status: SHIPPED | OUTPATIENT
Start: 2020-02-06 | End: 2021-01-11

## 2020-06-05 ENCOUNTER — OFFICE VISIT (OUTPATIENT)
Dept: CARDIOLOGY CLINIC | Age: 70
End: 2020-06-05
Payer: MEDICARE

## 2020-06-05 VITALS
SYSTOLIC BLOOD PRESSURE: 146 MMHG | OXYGEN SATURATION: 94 % | HEIGHT: 73 IN | HEART RATE: 74 BPM | DIASTOLIC BLOOD PRESSURE: 70 MMHG | WEIGHT: 286 LBS | BODY MASS INDEX: 37.91 KG/M2

## 2020-06-05 PROCEDURE — 1036F TOBACCO NON-USER: CPT | Performed by: INTERNAL MEDICINE

## 2020-06-05 PROCEDURE — 3017F COLORECTAL CA SCREEN DOC REV: CPT | Performed by: INTERNAL MEDICINE

## 2020-06-05 PROCEDURE — G8417 CALC BMI ABV UP PARAM F/U: HCPCS | Performed by: INTERNAL MEDICINE

## 2020-06-05 PROCEDURE — G8427 DOCREV CUR MEDS BY ELIG CLIN: HCPCS | Performed by: INTERNAL MEDICINE

## 2020-06-05 PROCEDURE — 99214 OFFICE O/P EST MOD 30 MIN: CPT | Performed by: INTERNAL MEDICINE

## 2020-06-05 PROCEDURE — 1123F ACP DISCUSS/DSCN MKR DOCD: CPT | Performed by: INTERNAL MEDICINE

## 2020-06-05 PROCEDURE — 4040F PNEUMOC VAC/ADMIN/RCVD: CPT | Performed by: INTERNAL MEDICINE

## 2020-07-13 RX ORDER — AMLODIPINE BESYLATE 10 MG/1
TABLET ORAL
Qty: 90 TABLET | Refills: 3 | Status: SHIPPED | OUTPATIENT
Start: 2020-07-13 | End: 2021-08-09

## 2020-07-13 RX ORDER — ATORVASTATIN CALCIUM 20 MG/1
20 TABLET, FILM COATED ORAL DAILY
Qty: 90 TABLET | Refills: 3 | Status: SHIPPED | OUTPATIENT
Start: 2020-07-13 | End: 2021-08-09

## 2020-07-13 RX ORDER — CARVEDILOL 25 MG/1
TABLET ORAL
Qty: 180 TABLET | Refills: 3 | Status: SHIPPED | OUTPATIENT
Start: 2020-07-13 | End: 2021-08-09

## 2020-08-25 ENCOUNTER — OFFICE VISIT (OUTPATIENT)
Dept: PRIMARY CARE CLINIC | Age: 70
End: 2020-08-25
Payer: MEDICARE

## 2020-08-25 VITALS
TEMPERATURE: 98.7 F | BODY MASS INDEX: 37.8 KG/M2 | DIASTOLIC BLOOD PRESSURE: 90 MMHG | HEART RATE: 69 BPM | WEIGHT: 285.2 LBS | OXYGEN SATURATION: 100 % | SYSTOLIC BLOOD PRESSURE: 160 MMHG | HEIGHT: 73 IN

## 2020-08-25 DIAGNOSIS — I10 HTN (HYPERTENSION), BENIGN: Chronic | ICD-10-CM

## 2020-08-25 DIAGNOSIS — E11.9 TYPE 2 DIABETES MELLITUS WITHOUT COMPLICATION, WITHOUT LONG-TERM CURRENT USE OF INSULIN (HCC): Chronic | ICD-10-CM

## 2020-08-25 DIAGNOSIS — Z12.5 SCREENING FOR PROSTATE CANCER: ICD-10-CM

## 2020-08-25 PROBLEM — E66.01 MORBIDLY OBESE (HCC): Status: ACTIVE | Noted: 2020-08-25

## 2020-08-25 LAB
A/G RATIO: 1.7 (ref 1.1–2.2)
ALBUMIN SERPL-MCNC: 4.5 G/DL (ref 3.4–5)
ALP BLD-CCNC: 46 U/L (ref 40–129)
ALT SERPL-CCNC: 15 U/L (ref 10–40)
ANION GAP SERPL CALCULATED.3IONS-SCNC: 12 MMOL/L (ref 3–16)
AST SERPL-CCNC: 16 U/L (ref 15–37)
BASOPHILS ABSOLUTE: 0 K/UL (ref 0–0.2)
BASOPHILS RELATIVE PERCENT: 0.9 %
BILIRUB SERPL-MCNC: 0.8 MG/DL (ref 0–1)
BUN BLDV-MCNC: 11 MG/DL (ref 7–20)
CALCIUM SERPL-MCNC: 9.6 MG/DL (ref 8.3–10.6)
CHLORIDE BLD-SCNC: 104 MMOL/L (ref 99–110)
CHOLESTEROL, TOTAL: 161 MG/DL (ref 0–199)
CO2: 23 MMOL/L (ref 21–32)
CREAT SERPL-MCNC: 1.1 MG/DL (ref 0.8–1.3)
EOSINOPHILS ABSOLUTE: 0.1 K/UL (ref 0–0.6)
EOSINOPHILS RELATIVE PERCENT: 2.5 %
GFR AFRICAN AMERICAN: >60
GFR NON-AFRICAN AMERICAN: >60
GLOBULIN: 2.7 G/DL
GLUCOSE BLD-MCNC: 96 MG/DL (ref 70–99)
HCT VFR BLD CALC: 40.2 % (ref 40.5–52.5)
HDLC SERPL-MCNC: 53 MG/DL (ref 40–60)
HEMOGLOBIN: 13.4 G/DL (ref 13.5–17.5)
LDL CHOLESTEROL CALCULATED: 91 MG/DL
LYMPHOCYTES ABSOLUTE: 1.3 K/UL (ref 1–5.1)
LYMPHOCYTES RELATIVE PERCENT: 25.5 %
MCH RBC QN AUTO: 25.9 PG (ref 26–34)
MCHC RBC AUTO-ENTMCNC: 33.3 G/DL (ref 31–36)
MCV RBC AUTO: 77.8 FL (ref 80–100)
MONOCYTES ABSOLUTE: 0.6 K/UL (ref 0–1.3)
MONOCYTES RELATIVE PERCENT: 11.4 %
NEUTROPHILS ABSOLUTE: 3 K/UL (ref 1.7–7.7)
NEUTROPHILS RELATIVE PERCENT: 59.7 %
PDW BLD-RTO: 16.7 % (ref 12.4–15.4)
PLATELET # BLD: 194 K/UL (ref 135–450)
PMV BLD AUTO: 9 FL (ref 5–10.5)
POTASSIUM SERPL-SCNC: 3.9 MMOL/L (ref 3.5–5.1)
PROSTATE SPECIFIC ANTIGEN: 1.71 NG/ML (ref 0–4)
RBC # BLD: 5.17 M/UL (ref 4.2–5.9)
SODIUM BLD-SCNC: 139 MMOL/L (ref 136–145)
TOTAL PROTEIN: 7.2 G/DL (ref 6.4–8.2)
TRIGL SERPL-MCNC: 87 MG/DL (ref 0–150)
TSH SERPL DL<=0.05 MIU/L-ACNC: 2.09 UIU/ML (ref 0.27–4.2)
VLDLC SERPL CALC-MCNC: 17 MG/DL
WBC # BLD: 5 K/UL (ref 4–11)

## 2020-08-25 PROCEDURE — G8417 CALC BMI ABV UP PARAM F/U: HCPCS | Performed by: INTERNAL MEDICINE

## 2020-08-25 PROCEDURE — 1036F TOBACCO NON-USER: CPT | Performed by: INTERNAL MEDICINE

## 2020-08-25 PROCEDURE — 90732 PPSV23 VACC 2 YRS+ SUBQ/IM: CPT | Performed by: INTERNAL MEDICINE

## 2020-08-25 PROCEDURE — 3046F HEMOGLOBIN A1C LEVEL >9.0%: CPT | Performed by: INTERNAL MEDICINE

## 2020-08-25 PROCEDURE — 1123F ACP DISCUSS/DSCN MKR DOCD: CPT | Performed by: INTERNAL MEDICINE

## 2020-08-25 PROCEDURE — 3017F COLORECTAL CA SCREEN DOC REV: CPT | Performed by: INTERNAL MEDICINE

## 2020-08-25 PROCEDURE — 4040F PNEUMOC VAC/ADMIN/RCVD: CPT | Performed by: INTERNAL MEDICINE

## 2020-08-25 PROCEDURE — G8427 DOCREV CUR MEDS BY ELIG CLIN: HCPCS | Performed by: INTERNAL MEDICINE

## 2020-08-25 PROCEDURE — G0009 ADMIN PNEUMOCOCCAL VACCINE: HCPCS | Performed by: INTERNAL MEDICINE

## 2020-08-25 PROCEDURE — 2022F DILAT RTA XM EVC RTNOPTHY: CPT | Performed by: INTERNAL MEDICINE

## 2020-08-25 PROCEDURE — 99215 OFFICE O/P EST HI 40 MIN: CPT | Performed by: INTERNAL MEDICINE

## 2020-08-25 RX ORDER — SILDENAFIL 50 MG/1
50 TABLET, FILM COATED ORAL DAILY PRN
Qty: 10 TABLET | Refills: 3 | Status: SHIPPED | OUTPATIENT
Start: 2020-08-25

## 2020-08-25 RX ORDER — CLONIDINE HYDROCHLORIDE 0.1 MG/1
0.1 TABLET ORAL 2 TIMES DAILY
Qty: 60 TABLET | Refills: 3 | Status: SHIPPED | OUTPATIENT
Start: 2020-08-25 | End: 2021-01-13 | Stop reason: SDUPTHER

## 2020-08-25 ASSESSMENT — PATIENT HEALTH QUESTIONNAIRE - PHQ9
SUM OF ALL RESPONSES TO PHQ QUESTIONS 1-9: 0
SUM OF ALL RESPONSES TO PHQ QUESTIONS 1-9: 0
1. LITTLE INTEREST OR PLEASURE IN DOING THINGS: 0
SUM OF ALL RESPONSES TO PHQ9 QUESTIONS 1 & 2: 0
2. FEELING DOWN, DEPRESSED OR HOPELESS: 0

## 2020-08-25 NOTE — ASSESSMENT & PLAN NOTE
On vit D   Patient is compliant w medications, no side effects, effective, provides adequate symptom relief. No new symptoms or problems as noted by patient. The problem is stable, no changes noted by patient. Will consider monitoring labs and refill medications as appropriate. Patient counseled and will continue current plan.

## 2020-08-25 NOTE — ASSESSMENT & PLAN NOTE
Diabetes Mellitus Type II:  Home blood sugar records reviewed: fasting range: 120-130. No significant episodes of hypoglycemia. Compliant with medications. No polyuria, polydipsia, visual changes, foot problems, GI upset. Diabetic diet compliance:  compliant most of the time. Current exercise: none. Will check labs, and refill medications as appropriate. Hypertension:  Denies CP, SOB, visual changes, dizziness, palpitations or HA. He is adherent to a low sodium diet. Blood pressure typically runs ok  outside of the office. Continue current medications. Hyperlipidemia:  No new myalgias or GI upset on current medications. Lab Results   Component Value Date    LABA1C 6.4 05/16/2019    LABA1C 6.2 09/29/2017    LABA1C 6.1 03/17/2016     Lab Results   Component Value Date    LABMICR 11.90 (H) 09/29/2017    CREATININE 1.1 05/30/2019     Lab Results   Component Value Date    ALT 20 05/30/2019    AST 21 05/30/2019     No components found for: CHLPL  Lab Results   Component Value Date    TRIG 77 05/30/2019     Lab Results   Component Value Date    HDL 45 05/30/2019     Lab Results   Component Value Date    LDLCALC 80 05/30/2019         BP Readings from Last 2 Encounters:   08/25/20 (!) 160/90   06/05/20 (!) 146/70     Hemoglobin A1C (%)   Date Value   05/16/2019 6.4     Microalbumin, Random Urine (mg/dL)   Date Value   09/29/2017 11.90 (H)     LDL Calculated (mg/dL)   Date Value   05/30/2019 80              Tobacco use:  Patient  reports that he quit smoking about 44 years ago. His smoking use included pipe. He smoked 0.00 packs per day for 30.00 years. He has never used smokeless tobacco.    If Smoker - Cessation materials given? NA    Is Daily aspirin on medication list?:  Yes    Diabetic retinal exam done? Yes   If yes, document in Health Maintenance. Monofilament placed on counter? Yes    Shoes and socks removed? Yes    BP taken with correct size cuff?  Yes   Repeated if > 130/80 Yes     Microalbumin

## 2020-08-25 NOTE — ASSESSMENT & PLAN NOTE
On coreg and asa,  No cp or sob or dizziness,   Patient is compliant w medications, no side effects, effective, provides adequate symptom relief. No new symptoms or problems as noted by patient. The problem is stable, no changes noted by patient. Will consider monitoring labs and refill medications as appropriate. Patient counseled and will continue current plan.

## 2020-08-25 NOTE — PROGRESS NOTES
Subjective:      Patient ID: Larissa Dewitt is a 79 y.o. male. HPI  Established patient here for a visit to manage acute and chronic medical conditions as detailed below. HTN (hypertension), benign  This is a chronic problem. The problem is well controlled. Patient monitors readings regularly. Pertinent negatives include no chest pain, focal sensory loss, focal weakness, leg pain, myalgias or shortness of breath. No headaches or chest pain. Takes medications regularly. Blood pressure has been stable, blood work was reviewed, and advised patient to continue the current instructions or medications. Hyperlipidemia with target LDL less than 70  This has been a long standing problem, takes lipitor      Monitors diet and tries to follow a low fat diet. Has  been reasonably  compliant w exercise. Lipids have been stable, The problem is controlled. Recent lipid tests were reviewed and are normal. Pertinent negatives include no chest pain, focal sensory loss, focal weakness, leg pain, myalgias or shortness of breath. Advised patient to continue the current instructions or medications. DM type 2 (diabetes mellitus, type 2) ([facility] [address]  Diabetes Mellitus Type II:  Home blood sugar records reviewed: fasting range: 120-130. No significant episodes of hypoglycemia. Compliant with medications. No polyuria, polydipsia, visual changes, foot problems, GI upset. Diabetic diet compliance:  compliant most of the time. Current exercise: none. Will check labs, and refill medications as appropriate. Hypertension:  Denies CP, SOB, visual changes, dizziness, palpitations or HA. He is adherent to a low sodium diet. Blood pressure typically runs ok  outside of the office. Continue current medications. Hyperlipidemia:  No new myalgias or GI upset on current medications.        Lab Results   Component Value Date    LABA1C 6.4 05/16/2019    LABA1C 6.2 09/29/2017    LABA1C 6.1 03/17/2016     Lab Results   Component Value Date    LABMICR 11.90 (H) 09/29/2017    CREATININE 1.1 05/30/2019     Lab Results   Component Value Date    ALT 20 05/30/2019    AST 21 05/30/2019     No components found for: CHLPL  Lab Results   Component Value Date    TRIG 77 05/30/2019     Lab Results   Component Value Date    HDL 45 05/30/2019     Lab Results   Component Value Date    LDLCALC 80 05/30/2019         BP Readings from Last 2 Encounters:   08/25/20 (!) 160/90   06/05/20 (!) 146/70     Hemoglobin A1C (%)   Date Value   05/16/2019 6.4     Microalbumin, Random Urine (mg/dL)   Date Value   09/29/2017 11.90 (H)     LDL Calculated (mg/dL)   Date Value   05/30/2019 80              Tobacco use:  Patient  reports that he quit smoking about 44 years ago. His smoking use included pipe. He smoked 0.00 packs per day for 30.00 years. He has never used smokeless tobacco.    If Smoker - Cessation materials given? NA    Is Daily aspirin on medication list?:  Yes    Diabetic retinal exam done? Yes   If yes, document in Health Maintenance. Monofilament placed on counter? Yes    Shoes and socks removed? Yes    BP taken with correct size cuff? Yes   Repeated if > 130/80 Yes     Microalbumin performed if applicable? Yes      Diabetes mellitus with microalbuminuric diabetic nephropathy ([facility] [address]  On no meds,   Will closely monitor. Neurofibromatosis ([facility] [address]  Has been stable. No new growths. Morbidly obese ([address]  Counseled regarding diet and weight loss. Homocysteinemia ([facility] [address]  Will pedrito nue to monitor w blood work.,    Coronary artery disease involving native coronary artery of native heart without angina pectoris  On coreg and asa,  No cp or sob or dizziness,   Patient is compliant w medications, no side effects, effective, provides adequate symptom relief. No new symptoms or problems as noted by patient. The problem is stable, no changes noted by patient. Will consider monitoring labs and refill medications as appropriate.  Patient counseled and will continue current plan. Vitamin D deficiency  On vit D   Patient is compliant w medications, no side effects, effective, provides adequate symptom relief. No new symptoms or problems as noted by patient. The problem is stable, no changes noted by patient. Will consider monitoring labs and refill medications as appropriate. Patient counseled and will continue current plan. Past Medical History:   Diagnosis Date    CAD (coronary artery disease) 7/9/2004    Coronary artery disease involving native coronary artery of native heart without angina pectoris     2004 cath > Taxus stents distal/prox RCA 2006 Myoview> EF 69%, small inferior fixed defect distal RCA     Diabetes mellitus with microalbuminuric diabetic nephropathy ([address] 05/01/2015    157    Diverticulosis of colon 08/31/2018    moderate diffuse    DM type 2 (diabetes mellitus, type 2) ([address] 7/7/2014    ED (erectile dysfunction)     Epistaxis 01/01/2012    cautry    Homocysteinemia ([facility] [address] 09/24/2014    13    HTN (hypertension), benign 01/01/2001    moderate concentric left ventricular hypertrophy 2/16/17    Hyperlipidemia LDL goal < 70 7/12/2004    Low testosterone 05/17/2013    204    MI (myocardial infarction) ([address] 07/09/2004    with 2 stents RCA prox and distal    Neurofibromatosis ([address]     right leg lateral neurofibroma    ANTONIETTA (obstructive sleep apnea) 06/19/2013    CPAP      PSA elevation 1/1/2000    No Bx    Sensorineural hearing loss, bilateral 7/3/2013    right > left    Vitamin D deficiency 01/27/2014    26       Past Surgical History:   Procedure Laterality Date    CARDIAC SURGERY  2004    STENTS    COLONOSCOPY  01/01/2006    NO POLYPS    COLONOSCOPY N/A 08/31/2018    Tubular adenoma. Every 5 year colonoscopy. Rectal bleeding for 4 days postop.     CORONARY ANGIOPLASTY WITH STENT PLACEMENT  7/12/2004    2 stents in RCA    LEG SURGERY Left ~2001    [facility]  Lat calf & lat     NASAL HEMORRHAGE CONTROL  1/1/2012       Current Outpatient Medications   Medication Sig Dispense Refill    cloNIDine (CATAPRES) 0.1 MG tablet Take 1 tablet by mouth 2 times daily 60 tablet 3    amLODIPine (NORVASC) 10 MG tablet TAKE 1 TABLET BY MOUTH DAILY 90 tablet 3    carvedilol (COREG) 25 MG tablet TAKE 1 TABLET BY MOUTH TWICE A  tablet 3    atorvastatin (LIPITOR) 20 MG tablet Take 1 tablet by mouth daily 90 tablet 3    valsartan-hydrochlorothiazide (DIOVAN-HCT) 320-12.5 MG per tablet TAKE 1 TABLET BY MOUTH DAILY 90 tablet 3    doxazosin (CARDURA) 8 MG tablet Take 1 tablet by mouth nightly 90 tablet 2    ONETOUCH DELICA LANCETS 24L MISC Test blood sugar twice daily. 3    travoprost, benzalkonium, (TRAVATAN) 0.004 % ophthalmic solution Place 1 drop into both eyes nightly.  glucose blood VI test strips (ASCENSIA AUTODISC VI;ONE TOUCH ULTRA TEST VI) strip 1 each by In Vitro route 2 times daily. As needed. 100 each 3    Blood Glucose Monitoring Suppl (BLOOD GLUCOSE METER) KIT 1 Device by Does not apply route 2 times daily. 1 kit 0    aspirin 81 MG tablet Take 81 mg by mouth daily.  therapeutic multivitamin-minerals (THERAGRAN-M) tablet Take 1 tablet by mouth daily. No current facility-administered medications for this visit.         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 No falls. No paresthesias. No joint swelling or redness. No joint pain. No recent weight loss. No focal weakness or sensory deficits or paresthesias, No confusion or altered sensorium. No hematemesis. No hearing loss. No siezures. All other systems were reviewed, and review was negative. Objective:   Physical Exam  BP (!) 160/90 (Site: Left Upper Arm, Position: Sitting, Cuff Size: Large Adult)   Pulse 69   Temp 98.7 °F (37.1 °C) (Oral)   Ht 6' 1\" (1.854 m)   Wt 285 lb 3.2 oz (129.4 kg)   SpO2 100%   BMI 37.63 kg/m²    The physical exam reveals a patient who appears well, alert and oriented x 3, pleasant, cooperative. Vitals are as noted. Head is atraumatic and normocephalic. Eyes reveal normal conjunctiva, cornea normal, pupils are equal and rective to light. Nasal mucosa is normal. Throat is normal without exudates. Ears reveal normal tympanic membranes. Neck is supple and free of adenopathy, or masses. No thyromegaly. No jugular venous distension. Lungs are clear to auscultation, no rales or rhonchi noted. Heart sounds are regular , no murmurs, clicks, gallops or rubs. Abdomen is soft, no tenderness, masses or organomegaly. Bowel sounds are normally heard. Pelvis: normal. Extremities are normal. Peripheral pulses are normal. Screening neurological exam is normal without focal findings. Cranial nerves are intact, reflexes are symmetrical and muscle strength eaqual. Skin is normal without suspicious lesions noted.  diabetic foot exam- normal monofilament exam and normal pulses. Assessment:      HTN (hypertension), benign  This is a chronic problem. The problem is well controlled. Patient monitors readings regularly. Pertinent negatives include no chest pain, focal sensory loss, focal weakness, leg pain, myalgias or shortness of breath. No headaches or chest pain. Takes medications regularly.   Blood pressure has been stable, blood work was reviewed, and advised patient to continue the current instructions or medications. Hyperlipidemia with target LDL less than 70  This has been a long standing problem, takes lipitor      Monitors diet and tries to follow a low fat diet. Has  been reasonably  compliant w exercise. Lipids have been stable, The problem is controlled. Recent lipid tests were reviewed and are normal. Pertinent negatives include no chest pain, focal sensory loss, focal weakness, leg pain, myalgias or shortness of breath. Advised patient to continue the current instructions or medications. DM type 2 (diabetes mellitus, type 2) ([facility]  Diabetes Mellitus Type II:  Home blood sugar records reviewed: fasting range: 120-130. No significant episodes of hypoglycemia. Compliant with medications. No polyuria, polydipsia, visual changes, foot problems, GI upset. Diabetic diet compliance:  compliant most of the time. Current exercise: none. Will check labs, and refill medications as appropriate. Hypertension:  Denies CP, SOB, visual changes, dizziness, palpitations or HA. He is adherent to a low sodium diet. Blood pressure typically runs ok  outside of the office. Continue current medications. Hyperlipidemia:  No new myalgias or GI upset on current medications.        Lab Results   Component Value Date    LABA1C 6.4 05/16/2019    LABA1C 6.2 09/29/2017    LABA1C 6.1 03/17/2016     Lab Results   Component Value Date    LABMICR 11.90 (H) 09/29/2017    CREATININE 1.1 05/30/2019     Lab Results   Component Value Date    ALT 20 05/30/2019    AST 21 05/30/2019     No components found for: CHLPL  Lab Results   Component Value Date    TRIG 77 05/30/2019     Lab Results   Component Value Date    HDL 45 05/30/2019     Lab Results   Component Value Date    LDLCALC 80 05/30/2019         BP Readings from Last 2 Encounters:   08/25/20 (!) 160/90   06/05/20 (!) 146/70     Hemoglobin A1C (%)   Date Value   05/16/2019 6.4     Microalbumin, Random Urine (mg/dL)   Date Value   09/29/2017 11.90

## 2020-08-26 LAB
ESTIMATED AVERAGE GLUCOSE: 145.6 MG/DL
HBA1C MFR BLD: 6.7 %

## 2020-10-22 RX ORDER — DOXAZOSIN 8 MG/1
8 TABLET ORAL NIGHTLY
Qty: 90 TABLET | Refills: 2 | Status: SHIPPED | OUTPATIENT
Start: 2020-10-22 | End: 2021-07-15

## 2021-01-11 RX ORDER — VALSARTAN AND HYDROCHLOROTHIAZIDE 320; 12.5 MG/1; MG/1
1 TABLET, FILM COATED ORAL DAILY
Qty: 90 TABLET | Refills: 3 | Status: SHIPPED | OUTPATIENT
Start: 2021-01-11 | End: 2021-08-31 | Stop reason: SDUPTHER

## 2021-01-13 ENCOUNTER — OFFICE VISIT (OUTPATIENT)
Dept: CARDIOLOGY CLINIC | Age: 71
End: 2021-01-13
Payer: MEDICARE

## 2021-01-13 VITALS
OXYGEN SATURATION: 97 % | DIASTOLIC BLOOD PRESSURE: 72 MMHG | SYSTOLIC BLOOD PRESSURE: 138 MMHG | HEIGHT: 73 IN | WEIGHT: 286 LBS | BODY MASS INDEX: 37.91 KG/M2 | HEART RATE: 68 BPM

## 2021-01-13 DIAGNOSIS — E78.5 HYPERLIPIDEMIA WITH TARGET LDL LESS THAN 70: Chronic | ICD-10-CM

## 2021-01-13 DIAGNOSIS — I25.10 CORONARY ARTERY DISEASE INVOLVING NATIVE CORONARY ARTERY OF NATIVE HEART WITHOUT ANGINA PECTORIS: Primary | Chronic | ICD-10-CM

## 2021-01-13 DIAGNOSIS — I10 HTN (HYPERTENSION), BENIGN: Chronic | ICD-10-CM

## 2021-01-13 PROCEDURE — 4040F PNEUMOC VAC/ADMIN/RCVD: CPT | Performed by: INTERNAL MEDICINE

## 2021-01-13 PROCEDURE — G8417 CALC BMI ABV UP PARAM F/U: HCPCS | Performed by: INTERNAL MEDICINE

## 2021-01-13 PROCEDURE — 3017F COLORECTAL CA SCREEN DOC REV: CPT | Performed by: INTERNAL MEDICINE

## 2021-01-13 PROCEDURE — G8484 FLU IMMUNIZE NO ADMIN: HCPCS | Performed by: INTERNAL MEDICINE

## 2021-01-13 PROCEDURE — 99214 OFFICE O/P EST MOD 30 MIN: CPT | Performed by: INTERNAL MEDICINE

## 2021-01-13 PROCEDURE — G8427 DOCREV CUR MEDS BY ELIG CLIN: HCPCS | Performed by: INTERNAL MEDICINE

## 2021-01-13 PROCEDURE — 1036F TOBACCO NON-USER: CPT | Performed by: INTERNAL MEDICINE

## 2021-01-13 PROCEDURE — 1123F ACP DISCUSS/DSCN MKR DOCD: CPT | Performed by: INTERNAL MEDICINE

## 2021-01-13 RX ORDER — CLONIDINE HYDROCHLORIDE 0.1 MG/1
0.1 TABLET ORAL 2 TIMES DAILY
Qty: 180 TABLET | Refills: 1 | Status: CANCELLED | OUTPATIENT
Start: 2021-01-13

## 2021-01-13 RX ORDER — CLONIDINE HYDROCHLORIDE 0.1 MG/1
0.1 TABLET ORAL 2 TIMES DAILY
Qty: 60 TABLET | Refills: 3 | Status: SHIPPED | OUTPATIENT
Start: 2021-01-13 | End: 2021-05-10

## 2021-01-13 NOTE — PROGRESS NOTES
[address]   Cardiac Evaluation      Patient: Sheila Francois  YOB: 1950  Date: 1/13/21       Chief Complaint   Patient presents with    Coronary Artery Disease    Hypertension    Hyperlipidemia        Referring provider: Blake Carpenter MD    History of Present Illness:   Mr Oseas Cardona is seen today in follow up. He has a h/o CAD, HTN, ANTONIETTA, and hyperlipidemia. 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 Today, Mr Oseas Cardona       Past Medical History:   has a past medical history of CAD (coronary artery disease), Coronary artery disease involving native coronary artery of native heart without angina pectoris, Diabetes mellitus with microalbuminuric diabetic nephropathy ([address] Diverticulosis of colon, DM type 2 (diabetes mellitus, type 2) ([address] ED (erectile dysfunction), Epistaxis, Homocysteinemia ([address] HTN (hypertension), benign, Hyperlipidemia LDL goal < 70, Low testosterone, MI (myocardial infarction) ([facility] [address] Neurofibromatosis ([facility] [address] ANTONIETTA (obstructive sleep apnea), PSA elevation, Sensorineural hearing loss, bilateral, and Vitamin D deficiency. Surgical History:   has a past surgical history that includes Coronary angioplasty with stent (7/12/2004); nasal hemorrhage control (1/1/2012); Leg Surgery (Left, ~2001); Cardiac surgery (2004); Colonoscopy (01/01/2006); and Colonoscopy (N/A, 08/31/2018).      Current Outpatient Medications   Medication Sig Dispense Refill    valsartan-hydrochlorothiazide (DIOVAN-HCT) 320-12.5 MG per tablet TAKE 1 TABLET BY MOUTH DAILY 90 tablet 3    doxazosin (CARDURA) 8 MG tablet Take 1 tablet by mouth nightly 90 tablet 2    cloNIDine (CATAPRES) 0.1 MG tablet Take 1 tablet by mouth 2 times daily 60 tablet 3    amLODIPine (NORVASC) 10 MG tablet TAKE 1 TABLET BY MOUTH DAILY 90 tablet 3    carvedilol (COREG) 25 MG tablet TAKE 1 TABLET BY MOUTH TWICE A  tablet 3    atorvastatin (LIPITOR) 20 MG tablet Take 1 tablet by mouth daily 90 tablet 3    travoprost, benzalkonium, (TRAVATAN) 0.004 % ophthalmic solution Place 1 drop into both eyes nightly.  sildenafil (VIAGRA) 50 MG tablet Take 1 tablet by mouth daily as needed for Erectile Dysfunction 10 tablet 3    ONETOUCH DELICA LANCETS 41G MISC Test blood sugar twice daily. 3    glucose blood VI test strips (ASCENSIA AUTODISC VI;ONE TOUCH ULTRA TEST VI) strip 1 each by In Vitro route 2 times daily. As needed. 100 each 3    Blood Glucose Monitoring Suppl (BLOOD GLUCOSE METER) KIT 1 Device by Does not apply route 2 times daily. 1 kit 0    aspirin 81 MG tablet Take 81 mg by mouth daily.  therapeutic multivitamin-minerals (THERAGRAN-M) tablet Take 1 tablet by mouth daily. No current facility-administered medications for this visit. Social History:  Social History     Social History    Marital status:      Spouse name: Batool Samuels Number of children: 2    Years of education: 25     Occupational History    semi-retired       outside consulting     Social History Main Topics    Smoking status: Former Smoker     Types: Pipe     Quit date: 1/1/1976    Smokeless tobacco: Never Used      Comment: [age] year 2 bowls /day    Alcohol use 0.0 oz/week      Comment: Wine occasionally ie wedding/business event. Was drinking mid 34's 9-8 scotch 1 yr.  Drug use: No      Comment: Tried MJ / hash.  Sexual activity: Not on file     Other Topics Concern    From [facility] and appreciates [employer]. Social History Narrative    Working 25-30 hours. [employer] 7000 steps. [employer] 3-4x/wk 75 min. 9/29/17. Family History:  family history includes High Blood Pressure in his father and mother; Hypertension in his brother; Ralph Roche in his father; No Known Problems in his son; Other in his brother; Stroke in his brother. Allergies:  Patient has no known allergies.      Review of Systems:   · Constitutional: there has been no unanticipated extremities  · Trace edema BLE  · No varicosities  · Femoral Arteries: 2+ and equal without bruits  · Pedal Pulses: 2+ and equal   Abdomen:  · No masses or tenderness  · Aorta not palpable  · Normal bowel sounds  Neurological/Psychiatric:  · Alert and oriented x3  · Moves all extremities well  · Exhibits normal gait balance and coordination      Assessment/Plan  1. Coronary artery disease involving native coronary artery of native heart without angina pectoris -stable  Stress echo 2/17: EF 60%. No regional wall motion abnormalities are seen. moderate concentric left ventricular hypertrophy. E/e\"= 14. Mild mitral regurgitation. Aortic valve appears sclerotic but opens adequately. No stenosis. Mild aortic regurgitation is present. Pressure half-time is calculated at 613 msec. Mild tricuspid regurgitation with RVSP estimated at 28 mmHg  2004 cath-- Taxus stents distal/prox RCA   2. HTN (hypertension), benign -  Controlled here, has not been checking at home. Is not currently taking Clonidine, he ran out of it. 3. Hyperlipidemia - stable on labs 8/25/20: ; TRIG 87; HDL 53; LDL 91, lipitor 20mg daily         PLAN:   Advised to monitor b/p noon and night x2 weeks then bring readings. Regular exercise and weight loss encouraged. FU 6 months. Scribe's attestation: This note was scribed in the presence of Dr Vicki Barajas MD by Elvis Guerrier RN. The scribe's documentation has been prepared under my direction and personally reviewed by me in its entirety. I confirm that the note above accurately reflects all work, treatment, procedures, and medical decision making performed by me. Thank you for allowing to me to participate in the care of Mandie Aviles.

## 2021-05-10 RX ORDER — CLONIDINE HYDROCHLORIDE 0.1 MG/1
TABLET ORAL
Qty: 180 TABLET | Refills: 1 | Status: SHIPPED | OUTPATIENT
Start: 2021-05-10 | End: 2021-08-31 | Stop reason: SDUPTHER

## 2021-05-17 ENCOUNTER — OFFICE VISIT (OUTPATIENT)
Dept: PRIMARY CARE CLINIC | Age: 71
End: 2021-05-17
Payer: MEDICARE

## 2021-05-17 VITALS
TEMPERATURE: 97.6 F | HEART RATE: 81 BPM | SYSTOLIC BLOOD PRESSURE: 136 MMHG | WEIGHT: 293 LBS | BODY MASS INDEX: 38.83 KG/M2 | HEIGHT: 73 IN | OXYGEN SATURATION: 99 % | DIASTOLIC BLOOD PRESSURE: 84 MMHG

## 2021-05-17 DIAGNOSIS — R79.83 HOMOCYSTEINEMIA: ICD-10-CM

## 2021-05-17 DIAGNOSIS — E66.01 MORBIDLY OBESE (HCC): ICD-10-CM

## 2021-05-17 DIAGNOSIS — I10 HTN (HYPERTENSION), BENIGN: ICD-10-CM

## 2021-05-17 DIAGNOSIS — E55.9 VITAMIN D DEFICIENCY: Chronic | ICD-10-CM

## 2021-05-17 DIAGNOSIS — E11.21 DIABETES MELLITUS WITH MICROALBUMINURIC DIABETIC NEPHROPATHY (HCC): Chronic | ICD-10-CM

## 2021-05-17 DIAGNOSIS — E11.29 MICROALBUMINURIA DUE TO TYPE 2 DIABETES MELLITUS (HCC): ICD-10-CM

## 2021-05-17 DIAGNOSIS — Q85.00 NEUROFIBROMATOSIS (HCC): ICD-10-CM

## 2021-05-17 DIAGNOSIS — Z12.5 SCREENING FOR PROSTATE CANCER: ICD-10-CM

## 2021-05-17 DIAGNOSIS — I25.10 CORONARY ARTERY DISEASE INVOLVING NATIVE CORONARY ARTERY OF NATIVE HEART WITHOUT ANGINA PECTORIS: Chronic | ICD-10-CM

## 2021-05-17 DIAGNOSIS — R80.9 MICROALBUMINURIA DUE TO TYPE 2 DIABETES MELLITUS (HCC): ICD-10-CM

## 2021-05-17 DIAGNOSIS — Z00.00 ROUTINE GENERAL MEDICAL EXAMINATION AT A HEALTH CARE FACILITY: Primary | ICD-10-CM

## 2021-05-17 DIAGNOSIS — R35.0 URINARY FREQUENCY: ICD-10-CM

## 2021-05-17 DIAGNOSIS — E11.9 TYPE 2 DIABETES MELLITUS WITHOUT COMPLICATION, WITHOUT LONG-TERM CURRENT USE OF INSULIN (HCC): ICD-10-CM

## 2021-05-17 DIAGNOSIS — E78.5 HYPERLIPIDEMIA WITH TARGET LDL LESS THAN 70: ICD-10-CM

## 2021-05-17 LAB — HBA1C MFR BLD: 6.6 %

## 2021-05-17 PROCEDURE — 3044F HG A1C LEVEL LT 7.0%: CPT | Performed by: INTERNAL MEDICINE

## 2021-05-17 PROCEDURE — 3017F COLORECTAL CA SCREEN DOC REV: CPT | Performed by: INTERNAL MEDICINE

## 2021-05-17 PROCEDURE — 4040F PNEUMOC VAC/ADMIN/RCVD: CPT | Performed by: INTERNAL MEDICINE

## 2021-05-17 PROCEDURE — 83036 HEMOGLOBIN GLYCOSYLATED A1C: CPT | Performed by: INTERNAL MEDICINE

## 2021-05-17 PROCEDURE — G0438 PPPS, INITIAL VISIT: HCPCS | Performed by: INTERNAL MEDICINE

## 2021-05-17 PROCEDURE — 1123F ACP DISCUSS/DSCN MKR DOCD: CPT | Performed by: INTERNAL MEDICINE

## 2021-05-17 ASSESSMENT — LIFESTYLE VARIABLES
HOW MANY STANDARD DRINKS CONTAINING ALCOHOL DO YOU HAVE ON A TYPICAL DAY: 0
HOW OFTEN DO YOU HAVE A DRINK CONTAINING ALCOHOL: 1

## 2021-05-17 ASSESSMENT — PATIENT HEALTH QUESTIONNAIRE - PHQ9
2. FEELING DOWN, DEPRESSED OR HOPELESS: 0
SUM OF ALL RESPONSES TO PHQ QUESTIONS 1-9: 0
SUM OF ALL RESPONSES TO PHQ QUESTIONS 1-9: 0

## 2021-05-17 NOTE — ASSESSMENT & PLAN NOTE
Diabetes Mellitus Type II:  Home blood sugar records reviewed: fasting range: 120-130. No significant episodes of hypoglycemia. Compliant with medications. No polyuria, polydipsia, visual changes, foot problems, GI upset. Diabetic diet compliance:  compliant most of the time. Current exercise: none. Will check labs, and refill medications as appropriate. Hypertension:  Denies CP, SOB, visual changes, dizziness, palpitations or HA. He is adherent to a low sodium diet. Blood pressure typically runs ok  outside of the office. Continue current medications. Hyperlipidemia:  No new myalgias or GI upset on current medications. Lab Results   Component Value Date    LABA1C 6.7 08/25/2020    LABA1C 6.4 05/16/2019    LABA1C 6.2 09/29/2017     Lab Results   Component Value Date    LABMICR 11.90 (H) 09/29/2017    CREATININE 1.1 08/25/2020     Lab Results   Component Value Date    ALT 15 08/25/2020    AST 16 08/25/2020     No components found for: CHLPL  Lab Results   Component Value Date    TRIG 87 08/25/2020     Lab Results   Component Value Date    HDL 53 08/25/2020     Lab Results   Component Value Date    LDLCALC 91 08/25/2020         BP Readings from Last 2 Encounters:   05/17/21 136/84   01/13/21 138/72     Hemoglobin A1C (%)   Date Value   08/25/2020 6.7     Microalbumin, Random Urine (mg/dL)   Date Value   09/29/2017 11.90 (H)     LDL Calculated (mg/dL)   Date Value   08/25/2020 91              Tobacco use:  Patient  reports that he quit smoking about 45 years ago. His smoking use included pipe. He smoked 0.00 packs per day for 30.00 years. He has never used smokeless tobacco.    If Smoker - Cessation materials given? NA    Is Daily aspirin on medication list?:  Yes    Diabetic retinal exam done? Yes   If yes, document in Health Maintenance. Monofilament placed on counter? Yes    Shoes and socks removed? Yes    BP taken with correct size cuff?  Yes   Repeated if > 130/80 Yes     Microalbumin performed if applicable?   Yes

## 2021-05-17 NOTE — PROGRESS NOTES
Medicare Annual Wellness Visit  Name: Amado Patel Date: 2021   MRN: [id number] Sex: Male   Age: 79 y.o. Ethnicity: Non-/Non    : 1950 Race: Black      Sam Beach is here for Medicare AWV    Screenings for behavioral, psychosocial and functional/safety risks, and cognitive dysfunction are all negative except as indicated below. These results, as well as other patient data from the [address] form, are documented in Flowsheets linked to this Encounter. No Known Allergies    Prior to Visit Medications    Medication Sig Taking? Authorizing Provider   cloNIDine (CATAPRES) 0.1 MG tablet TAKE 1 TABLET BY MOUTH TWICE A DAY Yes Jonn Mathews MD   valsartan-hydrochlorothiazide (DIOVAN-HCT) 320-12.5 MG per tablet TAKE 1 TABLET BY MOUTH DAILY Yes Jonn Mathews MD   doxazosin (CARDURA) 8 MG tablet Take 1 tablet by mouth nightly Yes Jonn Mathews MD   amLODIPine (NORVASC) 10 MG tablet TAKE 1 TABLET BY MOUTH DAILY Yes Jonn Mathews MD   carvedilol (COREG) 25 MG tablet TAKE 1 TABLET BY MOUTH TWICE A DAY Yes Jonn Mathews MD   atorvastatin (LIPITOR) 20 MG tablet Take 1 tablet by mouth daily Yes Jonn Mathews MD   [facility] M[facility] Test blood sugar twice daily. Yes Historical Provider, MD   travoprost, benzalkonium, (TRAVATAN) 0.004 % ophthalmic solution Place 1 drop into both eyes nightly. Yes Historical Provider, MD   glucose blood VI test strips (ASCENSIA AUTODISC VI;ONE TOUCH ULTRA TEST VI) strip 1 each by In Vitro route 2 times daily. As needed. Yes Elayne Neely DO   Blood Glucose Monitoring Suppl (BLOOD GLUCOSE METER) KIT 1 Device by Does not apply route 2 times daily. Yes Elayne Neely DO   aspirin 81 MG tablet Take 81 mg by mouth daily. Yes Historical Provider, MD   therapeutic multivitamin-minerals (THERAGRAN-M) tablet Take 1 tablet by mouth daily.  Yes Historical Provider, MD   sildenafil (VIAGRA) 50 MG tablet Take 1 tablet by mouth daily as needed for Erectile Dysfunction  Patient not taking: Reported on 5/17/2021  Reggie Dennis MD       Past Medical History:   Diagnosis Date    CAD (coronary artery disease) 7/9/2004    Coronary artery disease involving native coronary artery of native heart without angina pectoris     2004 cath > Taxus stents distal/prox RCA 2006 Myoview> EF 69%, small inferior fixed defect distal RCA     Diabetes mellitus with microalbuminuric diabetic nephropathy ([address] 05/01/2015    157    Diverticulosis of colon 08/31/2018    moderate diffuse    DM type 2 (diabetes mellitus, type 2) ([address] 7/7/2014    ED (erectile dysfunction)     Epistaxis 01/01/2012    cautry    Homocysteinemia ([address] 09/24/2014    13    HTN (hypertension), benign 01/01/2001    moderate concentric left ventricular hypertrophy 2/16/17    Hyperlipidemia LDL goal < 70 7/12/2004    Low testosterone 05/17/2013    204    MI (myocardial infarction) ([address] 07/09/2004    with 2 stents RCA prox and distal    Neurofibromatosis ([address]     right leg lateral neurofibroma    ANTONIETTA (obstructive sleep apnea) 06/19/2013    CPAP      PSA elevation 1/1/2000    No Bx    Sensorineural hearing loss, bilateral 7/3/2013    right > left    Vitamin D deficiency 01/27/2014    26       Past Surgical History:   Procedure Laterality Date    CARDIAC SURGERY  2004    STENTS    COLONOSCOPY  01/01/2006    NO POLYPS    COLONOSCOPY N/A 08/31/2018    Tubular adenoma. Every 5 year colonoscopy. Rectal bleeding for 4 days postop.     CORONARY ANGIOPLASTY WITH STENT PLACEMENT  7/12/2004    2 stents in RCA    LEG SURGERY Left ~2001    [facility]  Lat calf & lat     NASAL HEMORRHAGE CONTROL  1/1/2012       Family History   Problem Relation Age of Onset    High Blood Pressure Mother     High Blood Pressure Father     Lung Cancer Father         Smoker    Stroke Brother         Brain aneurysm    Hypertension Brother     Other Brother         Prostate    No Known Problems Son CareTe (Including outside providers/suppliers regularly involved in providing care):   Patient Care Team:  Anna Damon MD as PCP - General (Internal Medicine)  Anna Damon MD as PCP - [facility]  Christine Vasquez MD as Consulting Physician (Cardiology)  MACHO Ramírez - CNP as Nurse Practitioner (Nurse Practitioner)  Sarah Harmon MD as Consulting Physician (Cardiology)    Wt Readings from Last 3 Encounters:   05/17/21 293 lb (132.9 kg)   01/13/21 286 lb (129.7 kg)   08/25/20 285 lb 3.2 oz (129.4 kg)     Vitals:    05/17/21 0953   BP: 136/84   Site: Right Upper Arm   Position: Sitting   Cuff Size: Medium Adult   Pulse: 81   Temp: 97.6 °F (36.4 °C)   TempSrc: Infrared   SpO2: 99%   Weight: 293 lb (132.9 kg)   Height: 6' 1.2\" (1.859 m)     Body mass index is 38.45 kg/m². Based upon direct observation of the patient, evaluation of cognition reveals recent and remote memory intact.     General Appearance: alert and oriented to person, place and time, well developed and well- nourished, in no acute distress  Skin: warm and dry, no rash or erythema  Head: normocephalic and atraumatic  Eyes: pupils equal, round, and reactive to light, extraocular eye movements intact, conjunctivae normal  ENT: tympanic membrane, external ear and ear canal normal bilaterally, nose without deformity, nasal mucosa and turbinates normal without polyps  Neck: supple and non-tender without mass, no thyromegaly or thyroid nodules, no cervical lymphadenopathy  Pulmonary/Chest: clear to auscultation bilaterally- no wheezes, rales or rhonchi, normal air movement, no respiratory distress  Cardiovascular: normal rate, regular rhythm, normal S1 and S2, no murmurs, rubs, clicks, or gallops, distal pulses intact, no carotid bruits  Abdomen: soft, non-tender, non-distended, normal bowel sounds, no masses or organomegaly  Extremities: no cyanosis, clubbing or edema  Musculoskeletal: normal range of motion, no joint swelling, deformity or tenderness  Neurologic: reflexes normal and symmetric, no cranial nerve deficit, gait, coordination and speech normal    Patient's complete Health Risk Assessment and screening values have been reviewed and are found in Flowsheets. The following problems were reviewed today and where indicated follow up appointments were made and/or referrals ordered. Positive Risk Factor Screenings with Interventions:          General Health and ACP:  General  In general, how would you say your health is?: Good  In the past 7 days, have you experienced any of the following? New or Increased Pain, New or Increased Fatigue, Loneliness, Social Isolation, Stress or Anger?: (!) New or Increased Pain  Do you get the social and emotional support that you need?: (!) No  Do you have a Living Will?: Yes  Advance Directives     Power of [address] [place] [address] Will ACP-Advance Directive ACP-Power of     Not on File Not on File Not on File Not on File      General Health Risk Interventions:  · no issues    Health Habits/Nutrition:  Health Habits/Nutrition  Do you exercise for at least 20 minutes 2-3 times per week?: Yes  Have you lost any weight without trying in the past 3 months?: No  Do you eat only one meal per day?: (!) Yes  Have you seen the dentist within the past year?: (!) No  Body mass index: (!) 38.44  Health Habits/Nutrition Interventions:  · no issues noted    Hearing/Vision:  No exam data present  Hearing/Vision  Do you or your family notice any trouble with your hearing that hasn't been managed with hearing aids?: No  Do you have difficulty driving, watching TV, or doing any of your daily activities because of your eyesight?: (!) Yes  Hearing/Vision Interventions:  · no issues noted.       Personalized Preventive Plan   Current Health Maintenance Status  Immunization History   Administered Date(s) Administered    Influenza A ([id number]) Vaccine PF IM 03/23/2010    Influenza Virus Vaccine 11/01/2014    Influenza, High Dose (Fluzone 65 yrs and older) 12/03/2015, 10/13/2016, 09/29/2017, 10/08/2018    Influenza, Triv, inactivated, subunit, adjuvanted, IM (Fluad 65 yrs and older) 10/09/2019    Pneumococcal Conjugate 13-valent ([id number]) 09/29/2017    Pneumococcal Polysaccharide ([id number]) 08/25/2020        Health Maintenance   Topic Date Due    AAA screen  Never done    Hepatitis C screen  Never done    DTaP/Tdap/Td vaccine (1 - Tdap) Never done    Shingles Vaccine (1 of 2) Never done    Diabetic retinal exam  02/09/2016    Annual Wellness Visit (AWV)  Never done    Diabetic foot exam  08/25/2021    A1C test (Diabetic or Prediabetic)  08/25/2021    Lipid screen  08/25/2021    Potassium monitoring  08/25/2021    Creatinine monitoring  08/25/2021    Flu vaccine (Season Ended) 09/01/2021    Colon cancer screen colonoscopy  08/31/2023    Pneumococcal 65+ years Vaccine  Completed    COVID-19 Vaccine  Completed    Hepatitis A vaccine  Aged Out    Hib vaccine  Aged Out    Meningococcal (ACWY) vaccine  Aged Out     Recommendations for [employer] Due: see orders and patient instructions/AVS.  . Recommended screening schedule for the next 5-10 years is provided to the patient in written form: see Patient Instructions/AVS.      Established patient here for a visit to manage acute and chronic medical conditions as detailed below. Established patient here for a visit to manage acute and chronic medical conditions as detailed below. 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 No significant episodes of hypoglycemia. Compliant with medications. No polyuria, polydipsia, visual changes, foot problems, GI upset. Diabetic diet compliance:  compliant most of the time. Current exercise: none. Will check labs, and refill medications as appropriate.     Hypertension: Denies CP, SOB, visual changes, dizziness, palpitations or HA. He is adherent to a low sodium diet. Blood pressure typically runs ok  outside of the office. Continue current medications. Hyperlipidemia:  No new myalgias or GI upset on current medications. Lab Results   Component Value Date    LABA1C 6.7 08/25/2020    LABA1C 6.4 05/16/2019    LABA1C 6.2 09/29/2017     Lab Results   Component Value Date    LABMICR 11.90 (H) 09/29/2017    CREATININE 1.1 08/25/2020     Lab Results   Component Value Date    ALT 15 08/25/2020    AST 16 08/25/2020     No components found for: CHLPL  Lab Results   Component Value Date    TRIG 87 08/25/2020     Lab Results   Component Value Date    HDL 53 08/25/2020     Lab Results   Component Value Date    LDLCALC 91 08/25/2020         BP Readings from Last 2 Encounters:   05/17/21 136/84   01/13/21 138/72     Hemoglobin A1C (%)   Date Value   08/25/2020 6.7     Microalbumin, Random Urine (mg/dL)   Date Value   09/29/2017 11.90 (H)     LDL Calculated (mg/dL)   Date Value   08/25/2020 91              Tobacco use:  Patient  reports that he quit smoking about 45 years ago. His smoking use included pipe. He smoked 0.00 packs per day for 30.00 years. He has never used smokeless tobacco.    If Smoker - Cessation materials given? NA    Is Daily aspirin on medication list?:  Yes    Diabetic retinal exam done? Yes   If yes, document in Health Maintenance. Monofilament placed on counter? Yes    Shoes and socks removed? Yes    BP taken with correct size cuff? Yes   Repeated if > 130/80 Yes     Microalbumin performed if applicable? Yes       HTN (hypertension), benign  This is a chronic problem. The problem is well controlled. Patient monitors readings regularly. Pertinent negatives include no chest pain, focal sensory loss, focal weakness, leg pain, myalgias or shortness of breath. No headaches or chest pain. Takes medications regularly.   Blood pressure has been stable, blood work was reviewed, and advised patient to continue the current instructions or medications. Coronary artery disease involving native coronary artery of native heart without angina pectoris  No cp or sob or dizziness,  Patient is compliant w medications, no side effects, effective, provides adequate symptom relief. No new symptoms or problems as noted by patient. The problem is stable, no changes noted by patient. Will consider monitoring labs and refill medications as appropriate. Patient counseled and will continue current plan. Homocysteinemia ([address]  Will get blood work. Vitamin D deficiency  On vit D   Patient is compliant w medications, no side effects, effective, provides adequate symptom relief. No new symptoms or problems as noted by patient. The problem is stable, no changes noted by patient. Will consider monitoring labs and refill medications as appropriate. Patient counseled and will continue current plan. Hyperlipidemia with target LDL less than 70  This has been a long standing problem, takes lipitor      Monitors diet and tries to follow a low fat diet. Has  been reasonably  compliant w exercise. Lipids have been stable, The problem is controlled. Recent lipid tests were reviewed and are normal. Pertinent negatives include no chest pain, focal sensory loss, focal weakness, leg pain, myalgias or shortness of breath. Advised patient to continue the current instructions or medications. Diagnosis Orders   1. Routine general medical examination at a health care facility     2. Type 2 diabetes mellitus without complication, without long-term current use of insulin (HCC)  CBC Auto Differential    Comprehensive Metabolic Panel    TSH without Reflex    Lipid Panel    Microalbumin / Creatinine Urine Ratio   3. Screening for prostate cancer  PSA screening   4. HTN (hypertension), benign  CBC Auto Differential    Comprehensive Metabolic Panel    TSH without Reflex    Lipid Panel   5.

## 2021-05-17 NOTE — PATIENT INSTRUCTIONS
Personalized Preventive Plan for Arturo Hernandes - 5/17/2021  Medicare offers a range of preventive health benefits. Some of the tests and screenings are paid in full while other may be subject to a deductible, co-insurance, and/or copay. Some of these benefits include a comprehensive review of your medical history including lifestyle, illnesses that may run in your family, and various assessments and screenings as appropriate. After reviewing your medical record and screening and assessments performed today your provider may have ordered immunizations, labs, imaging, and/or referrals for you. A list of these orders (if applicable) as well as your Preventive Care list are included within your After Visit Summary for your review. Other Preventive Recommendations:    · A preventive eye exam performed by an eye specialist is recommended every 1-2 years to screen for glaucoma; cataracts, macular degeneration, and other eye disorders. · A preventive dental visit is recommended every 6 months. · Try to get at least 150 minutes of exercise per week or 10,000 steps per day on a pedometer . · Order or download the FREE \"Exercise & Physical Activity: Your Everyday Guide\" from [employer]. Call 7-839.329.6082 or search [employer] online. · You need 2598-5583 mg of calcium and 1103-8872 IU of vitamin D per day. It is possible to meet your calcium requirement with diet alone, but a vitamin D supplement is usually necessary to meet this goal.  · When exposed to the sun, use a sunscreen that protects against both UVA and UVB radiation with an SPF of 30 or greater. Reapply every 2 to 3 hours or after sweating, drying off with a towel, or swimming. · Always wear a seat belt when traveling in a car. Always wear a helmet when riding a bicycle or motorcycle.

## 2021-05-21 DIAGNOSIS — I10 HTN (HYPERTENSION), BENIGN: ICD-10-CM

## 2021-05-21 DIAGNOSIS — E11.9 TYPE 2 DIABETES MELLITUS WITHOUT COMPLICATION, WITHOUT LONG-TERM CURRENT USE OF INSULIN (HCC): ICD-10-CM

## 2021-05-21 DIAGNOSIS — E78.5 HYPERLIPIDEMIA WITH TARGET LDL LESS THAN 70: ICD-10-CM

## 2021-05-21 DIAGNOSIS — Z12.5 SCREENING FOR PROSTATE CANCER: ICD-10-CM

## 2021-05-21 LAB
A/G RATIO: 1.8 (ref 1.1–2.2)
ALBUMIN SERPL-MCNC: 4.5 G/DL (ref 3.4–5)
ALP BLD-CCNC: 48 U/L (ref 40–129)
ALT SERPL-CCNC: 18 U/L (ref 10–40)
ANION GAP SERPL CALCULATED.3IONS-SCNC: 13 MMOL/L (ref 3–16)
AST SERPL-CCNC: 17 U/L (ref 15–37)
BASOPHILS ABSOLUTE: 0 K/UL (ref 0–0.2)
BASOPHILS RELATIVE PERCENT: 0.7 %
BILIRUB SERPL-MCNC: 0.7 MG/DL (ref 0–1)
BUN BLDV-MCNC: 13 MG/DL (ref 7–20)
CALCIUM SERPL-MCNC: 9.5 MG/DL (ref 8.3–10.6)
CHLORIDE BLD-SCNC: 103 MMOL/L (ref 99–110)
CHOLESTEROL, TOTAL: 141 MG/DL (ref 0–199)
CO2: 25 MMOL/L (ref 21–32)
CREAT SERPL-MCNC: 1.1 MG/DL (ref 0.8–1.3)
CREATININE URINE: 134.2 MG/DL (ref 39–259)
EOSINOPHILS ABSOLUTE: 0.2 K/UL (ref 0–0.6)
EOSINOPHILS RELATIVE PERCENT: 3.8 %
GFR AFRICAN AMERICAN: >60
GFR NON-AFRICAN AMERICAN: >60
GLOBULIN: 2.5 G/DL
GLUCOSE BLD-MCNC: 104 MG/DL (ref 70–99)
HCT VFR BLD CALC: 41.2 % (ref 40.5–52.5)
HDLC SERPL-MCNC: 46 MG/DL (ref 40–60)
HEMOGLOBIN: 13.8 G/DL (ref 13.5–17.5)
LDL CHOLESTEROL CALCULATED: 79 MG/DL
LYMPHOCYTES ABSOLUTE: 0.9 K/UL (ref 1–5.1)
LYMPHOCYTES RELATIVE PERCENT: 21.9 %
MCH RBC QN AUTO: 26.4 PG (ref 26–34)
MCHC RBC AUTO-ENTMCNC: 33.5 G/DL (ref 31–36)
MCV RBC AUTO: 78.8 FL (ref 80–100)
MICROALBUMIN UR-MCNC: 12.9 MG/DL
MICROALBUMIN/CREAT UR-RTO: 96.1 MG/G (ref 0–30)
MONOCYTES ABSOLUTE: 0.6 K/UL (ref 0–1.3)
MONOCYTES RELATIVE PERCENT: 14.8 %
NEUTROPHILS ABSOLUTE: 2.5 K/UL (ref 1.7–7.7)
NEUTROPHILS RELATIVE PERCENT: 58.8 %
PDW BLD-RTO: 15.5 % (ref 12.4–15.4)
PLATELET # BLD: 187 K/UL (ref 135–450)
PMV BLD AUTO: 9 FL (ref 5–10.5)
POTASSIUM SERPL-SCNC: 3.8 MMOL/L (ref 3.5–5.1)
PROSTATE SPECIFIC ANTIGEN: 1.59 NG/ML (ref 0–4)
RBC # BLD: 5.22 M/UL (ref 4.2–5.9)
SODIUM BLD-SCNC: 141 MMOL/L (ref 136–145)
TOTAL PROTEIN: 7 G/DL (ref 6.4–8.2)
TRIGL SERPL-MCNC: 78 MG/DL (ref 0–150)
TSH SERPL DL<=0.05 MIU/L-ACNC: 2.44 UIU/ML (ref 0.27–4.2)
VLDLC SERPL CALC-MCNC: 16 MG/DL
WBC # BLD: 4.3 K/UL (ref 4–11)

## 2021-05-28 ENCOUNTER — OFFICE VISIT (OUTPATIENT)
Dept: PRIMARY CARE CLINIC | Age: 71
End: 2021-05-28
Payer: MEDICARE

## 2021-05-28 VITALS
HEIGHT: 73 IN | WEIGHT: 298.3 LBS | SYSTOLIC BLOOD PRESSURE: 130 MMHG | OXYGEN SATURATION: 100 % | HEART RATE: 99 BPM | DIASTOLIC BLOOD PRESSURE: 80 MMHG | BODY MASS INDEX: 39.54 KG/M2 | TEMPERATURE: 98 F

## 2021-05-28 DIAGNOSIS — Z01.818 PRE-OP EVALUATION: Primary | ICD-10-CM

## 2021-05-28 DIAGNOSIS — E11.21 DIABETES MELLITUS WITH MICROALBUMINURIC DIABETIC NEPHROPATHY (HCC): Chronic | ICD-10-CM

## 2021-05-28 DIAGNOSIS — E78.5 HYPERLIPIDEMIA WITH TARGET LDL LESS THAN 70: Chronic | ICD-10-CM

## 2021-05-28 DIAGNOSIS — H02.403 PTOSIS OF BOTH EYELIDS: Chronic | ICD-10-CM

## 2021-05-28 DIAGNOSIS — I10 HTN (HYPERTENSION), BENIGN: Chronic | ICD-10-CM

## 2021-05-28 DIAGNOSIS — E55.9 VITAMIN D DEFICIENCY: Chronic | ICD-10-CM

## 2021-05-28 DIAGNOSIS — I25.10 CORONARY ARTERY DISEASE INVOLVING NATIVE CORONARY ARTERY OF NATIVE HEART WITHOUT ANGINA PECTORIS: Chronic | ICD-10-CM

## 2021-05-28 DIAGNOSIS — E66.01 MORBIDLY OBESE (HCC): ICD-10-CM

## 2021-05-28 PROCEDURE — 4040F PNEUMOC VAC/ADMIN/RCVD: CPT | Performed by: INTERNAL MEDICINE

## 2021-05-28 PROCEDURE — 99215 OFFICE O/P EST HI 40 MIN: CPT | Performed by: INTERNAL MEDICINE

## 2021-05-28 PROCEDURE — 1036F TOBACCO NON-USER: CPT | Performed by: INTERNAL MEDICINE

## 2021-05-28 PROCEDURE — 2022F DILAT RTA XM EVC RTNOPTHY: CPT | Performed by: INTERNAL MEDICINE

## 2021-05-28 PROCEDURE — G8417 CALC BMI ABV UP PARAM F/U: HCPCS | Performed by: INTERNAL MEDICINE

## 2021-05-28 PROCEDURE — G8427 DOCREV CUR MEDS BY ELIG CLIN: HCPCS | Performed by: INTERNAL MEDICINE

## 2021-05-28 PROCEDURE — 3017F COLORECTAL CA SCREEN DOC REV: CPT | Performed by: INTERNAL MEDICINE

## 2021-05-28 PROCEDURE — 1123F ACP DISCUSS/DSCN MKR DOCD: CPT | Performed by: INTERNAL MEDICINE

## 2021-05-28 PROCEDURE — 3044F HG A1C LEVEL LT 7.0%: CPT | Performed by: INTERNAL MEDICINE

## 2021-05-28 SDOH — ECONOMIC STABILITY: FOOD INSECURITY: WITHIN THE PAST 12 MONTHS, YOU WORRIED THAT YOUR FOOD WOULD RUN OUT BEFORE YOU GOT MONEY TO BUY MORE.: NEVER TRUE

## 2021-05-28 SDOH — ECONOMIC STABILITY: FOOD INSECURITY: WITHIN THE PAST 12 MONTHS, THE FOOD YOU BOUGHT JUST DIDN'T LAST AND YOU DIDN'T HAVE MONEY TO GET MORE.: NEVER TRUE

## 2021-05-28 NOTE — ASSESSMENT & PLAN NOTE
No cp or sob,  Patient is compliant w medications, no side effects, effective, provides adequate symptom relief. No new symptoms or problems as noted by patient. The problem is stable, no changes noted by patient. Will consider monitoring labs and refill medications as appropriate. Patient counseled and will continue current plan.

## 2021-05-28 NOTE — PROGRESS NOTES
Subjective:        Reason for visit. Giovanni Mathews is a 79 y.o. male who presents for a preoperative physical examination. He is scheduled to have eyelid surgery done by Dr. Speedy Purdy at [address] on 6/3/21. History of Present Illness:      Here for a pre op eval for the above surgery. He is medically stable,  Ptosis of both eyelids  Here for a pre op eval for the above surgery,  He is medically stable. HTN (hypertension), benign  This is a chronic problem. The problem is well controlled. Patient monitors readings regularly. Pertinent negatives include no chest pain, focal sensory loss, focal weakness, leg pain, myalgias or shortness of breath. No headaches or chest pain. Takes medications regularly. Blood pressure has been stable, blood work was reviewed, and advised patient to continue the current instructions or medications. Coronary artery disease involving native coronary artery of native heart without angina pectoris  No cp or sob,  Patient is compliant w medications, no side effects, effective, provides adequate symptom relief. No new symptoms or problems as noted by patient. The problem is stable, no changes noted by patient. Will consider monitoring labs and refill medications as appropriate. Patient counseled and will continue current plan. Diabetes mellitus with microalbuminuric diabetic nephropathy ([address]  This problem is stable, reviewed in detail, and advised patient to continue the current instructions or medications. Will continue to closely monitor the situation. Vitamin D deficiency  On vit D   Patient is compliant w medications, no side effects, effective, provides adequate symptom relief. No new symptoms or problems as noted by patient. The problem is stable, no changes noted by patient. Will consider monitoring labs and refill medications as appropriate. Patient counseled and will continue current plan.       Morbidly obese ([address]  Patient counseled,   Encouraged to lose weight, watch diet and exercise consistently. Hyperlipidemia with target LDL less than 70  This has been a long standing problem, takes lipitor      Monitors diet and tries to follow a low fat diet. Has  been reasonably  compliant w exercise. Lipids have been stable, The problem is controlled. Recent lipid tests were reviewed and are normal. Pertinent negatives include no chest pain, focal sensory loss, focal weakness, leg pain, myalgias or shortness of breath. Advised patient to continue the current instructions or medications. Past Medical History:   Diagnosis Date    CAD (coronary artery disease) 7/9/2004    Coronary artery disease involving native coronary artery of native heart without angina pectoris     2004 cath > Taxus stents distal/prox RCA 2006 Myoview> EF 69%, small inferior fixed defect distal RCA     Diabetes mellitus with microalbuminuric diabetic nephropathy ([facility] [address] 05/01/2015    157    Diverticulosis of colon 08/31/2018    moderate diffuse    DM type 2 (diabetes mellitus, type 2) ([address] 7/7/2014    ED (erectile dysfunction)     Epistaxis 01/01/2012    cautry    Homocysteinemia ([facility] [address] 09/24/2014    13    HTN (hypertension), benign 01/01/2001    moderate concentric left ventricular hypertrophy 2/16/17    Hyperlipidemia LDL goal < 70 7/12/2004    Low testosterone 05/17/2013    204    MI (myocardial infarction) ([facility] [address] 07/09/2004    with 2 stents RCA prox and distal    Neurofibromatosis (HCC)     right leg lateral neurofibroma    ANTONIETTA (obstructive sleep apnea) 06/19/2013    CPAP      PSA elevation 1/1/2000    No Bx    Sensorineural hearing loss, bilateral 7/3/2013    right > left    Vitamin D deficiency 01/27/2014    26      No  previous anesthesia complications. Past Surgical History:   Procedure Laterality Date    CARDIAC SURGERY  2004    STENTS    COLONOSCOPY  01/01/2006    NO POLYPS    COLONOSCOPY N/A 08/31/2018    Tubular adenoma. Every 5 year colonoscopy. Rectal bleeding for 4 days postop.  CORONARY ANGIOPLASTY WITH STENT PLACEMENT  2004    2 stents in RCA    [facility]GERY Left ~    [facility]  Lat calf & lat     NASAL HEMORRHAGE CONTROL  2012                                                   Current Outpatient Medications   Medication Sig Dispense Refill    cloNIDine (CATAPRES) 0.1 MG tablet TAKE 1 TABLET BY MOUTH TWICE A  tablet 1    valsartan-hydrochlorothiazide (DIOVAN-HCT) 320-12.5 MG per tablet TAKE 1 TABLET BY MOUTH DAILY 90 tablet 3    doxazosin (CARDURA) 8 MG tablet Take 1 tablet by mouth nightly 90 tablet 2    amLODIPine (NORVASC) 10 MG tablet TAKE 1 TABLET BY MOUTH DAILY 90 tablet 3    carvedilol (COREG) 25 MG tablet TAKE 1 TABLET BY MOUTH TWICE A  tablet 3    atorvastatin (LIPITOR) 20 MG tablet Take 1 tablet by mouth daily 90 tablet 3    ONETOUCH DELICA LANCETS 44X MISC Test blood sugar twice daily. 3    travoprost, benzalkonium, (TRAVATAN) 0.004 % ophthalmic solution Place 1 drop into both eyes nightly.  glucose blood VI test strips (ASCENSIA AUTODISC VI;ONE TOUCH ULTRA TEST VI) strip 1 each by In Vitro route 2 times daily. As needed. 100 each 3    Blood Glucose Monitoring Suppl (BLOOD GLUCOSE METER) KIT 1 Device by Does not apply route 2 times daily. 1 kit 0    aspirin 81 MG tablet Take 81 mg by mouth daily.  therapeutic multivitamin-minerals (THERAGRAN-M) tablet Take 1 tablet by mouth daily.  sildenafil (VIAGRA) 50 MG tablet Take 1 tablet by mouth daily as needed for Erectile Dysfunction (Patient not taking: Reported on 2021) 10 tablet 3     No current facility-administered medications for this visit.        No Known Allergies    Social History     Tobacco Use    Smoking status: Former Smoker     Packs/day: 0.00     Years: 30.00     Pack years: 0.00     Types: Pipe     Quit date: 1976     Years since quittin.4    Smokeless tobacco: Never Used    Tobacco comment: 30-33 year 2 bowls /day Vaping Use    Vaping Use: Never used   Substance Use Topics    Alcohol use: Yes     Alcohol/week: 0.0 standard drinks     Comment: Wine occasionally ie wedding/business event. Was drinking mid 34's 9-8 scotch 1 yr.  Drug use: No     Comment: Tried MJ / hash. Family History   Problem Relation Age of Onset    High Blood Pressure Mother     High Blood Pressure Father     Lung Cancer Father         Smoker    Stroke Brother         Brain aneurysm    Hypertension Brother     Other Brother         Prostate    No Known Problems Son         Review Of Systems    Skin: no abnormal pigmentation, rash, scaling, itching, masses, hair or nail changes  Eyes: negative  Ears/Nose/Throat: negative  Respiratory: negative  Cardiovascular: negative  Gastrointestinal: negative  Genitourinary: negative  Musculoskeletal: negative  Neurologic: negative  Psychiatric: negative  Hematologic/Lymphatic/Immunologic: negative  Endocrine: negative       Objective:      /80 (Site: Right Upper Arm, Position: Sitting, Cuff Size: Medium Adult)   Pulse 99   Temp 98 °F (36.7 °C) (Infrared)   Ht 6' 1.2\" (1.859 m)   Wt 298 lb 4.8 oz (135.3 kg)   SpO2 100%   BMI 39.14 kg/m²   General appearance - healthy, alert, no distress  Skin - Skin color, texture, turgor normal. No rashes or lesions. Head - Normocephalic. No masses, lesions, tenderness or abnormalities  Eyes - conjunctivae/corneas clear. PERRL, EOM's intact. Ears - External ears normal. Canals clear. TM's normal.  Nose/Sinuses - Nares normal. Septum midline. Mucosa normal. No drainage or sinus tenderness. Oropharynx - Lips, mucosa, and tongue normal. Teeth and gums normal. Oropharynx pink and patent  Neck - Neck supple. No adenopathy. Thyroid symmetric, normal size,  Back - Back symmetric, no curvature. ROM normal. No CVA tenderness. Lungs - Percussion normal. Good diaphragmatic excursion.  Lungs clear  Heart - Regular rate and rhythm, with no rub, murmur or gallop noted.  Abdomen - Abdomen soft, non-tender. BS normal. No masses, organomegaly  Extremities - Extremities normal. No deformities, edema, or skin discolora  Musculoskeletal - Spine ROM normal. Muscular strength intact. Peripheral pulses - radial=2+,, femoral=2+, popliteal=2+, dorsalis pedis=2+,  Neuro - Gait normal. Reflexes normal and symmetric. Sensation grossly normal.  No focal weakness    EKG: not needed  BLOOD WORK: not needed. Assessment:      Pre op eval  Ptosis of both eyelids  Here for a pre op eval for the above surgery,  He is medically stable. HTN (hypertension), benign  This is a chronic problem. The problem is well controlled. Patient monitors readings regularly. Pertinent negatives include no chest pain, focal sensory loss, focal weakness, leg pain, myalgias or shortness of breath. No headaches or chest pain. Takes medications regularly. Blood pressure has been stable, blood work was reviewed, and advised patient to continue the current instructions or medications. Coronary artery disease involving native coronary artery of native heart without angina pectoris  No cp or sob,  Patient is compliant w medications, no side effects, effective, provides adequate symptom relief. No new symptoms or problems as noted by patient. The problem is stable, no changes noted by patient. Will consider monitoring labs and refill medications as appropriate. Patient counseled and will continue current plan. Diabetes mellitus with microalbuminuric diabetic nephropathy ([facility]  This problem is stable, reviewed in detail, and advised patient to continue the current instructions or medications. Will continue to closely monitor the situation. Vitamin D deficiency  On vit D   Patient is compliant w medications, no side effects, effective, provides adequate symptom relief. No new symptoms or problems as noted by patient. The problem is stable, no changes noted by patient.  Will consider monitoring labs and refill medications as appropriate. Patient counseled and will continue current plan. Morbidly obese ([address]  Patient counseled,   Encouraged to lose weight, watch diet and exercise consistently. Hyperlipidemia with target LDL less than 70  This has been a long standing problem, takes lipitor      Monitors diet and tries to follow a low fat diet. Has  been reasonably  compliant w exercise. Lipids have been stable, The problem is controlled. Recent lipid tests were reviewed and are normal. Pertinent negatives include no chest pain, focal sensory loss, focal weakness, leg pain, myalgias or shortness of breath. Advised patient to continue the current instructions or medications. Plan:        He is medically cleared for surgery and anesthesia. Per operating surgeon. See also orders filed with this encounter, if any. Instructions to patient: Do not take any NSAIDS 1 week prior to surgery.   Indication for alma rosa-operative beta blocker therapy: N/A      Kelly Kline MD   5/28/2021 10:07 AM

## 2021-07-15 RX ORDER — DOXAZOSIN 8 MG/1
TABLET ORAL
Qty: 90 TABLET | Refills: 2 | Status: SHIPPED | OUTPATIENT
Start: 2021-07-15 | End: 2021-08-31 | Stop reason: SDUPTHER

## 2021-08-09 RX ORDER — CARVEDILOL 25 MG/1
TABLET ORAL
Qty: 60 TABLET | Refills: 0 | Status: SHIPPED | OUTPATIENT
Start: 2021-08-09 | End: 2021-08-31 | Stop reason: SDUPTHER

## 2021-08-09 RX ORDER — ATORVASTATIN CALCIUM 20 MG/1
TABLET, FILM COATED ORAL
Qty: 30 TABLET | Refills: 0 | Status: SHIPPED | OUTPATIENT
Start: 2021-08-09 | End: 2021-08-31 | Stop reason: SDUPTHER

## 2021-08-09 RX ORDER — AMLODIPINE BESYLATE 10 MG/1
TABLET ORAL
Qty: 30 TABLET | Refills: 0 | Status: SHIPPED | OUTPATIENT
Start: 2021-08-09 | End: 2021-08-31 | Stop reason: SDUPTHER

## 2021-08-09 NOTE — TELEPHONE ENCOUNTER
Last ov 1/13/21  Pending appt overdue for apt  Last refill all meds 7/13/20 #90x3  Last labs 5/21/21      My chart message sent to pt of needing an apt

## 2021-08-14 ENCOUNTER — APPOINTMENT (OUTPATIENT)
Dept: CT IMAGING | Age: 71
DRG: 374 | End: 2021-08-14
Payer: MEDICARE

## 2021-08-14 ENCOUNTER — HOSPITAL ENCOUNTER (INPATIENT)
Age: 71
LOS: 4 days | Discharge: HOME | DRG: 374 | End: 2021-08-18
Attending: EMERGENCY MEDICINE | Admitting: INTERNAL MEDICINE
Payer: MEDICARE

## 2021-08-14 DIAGNOSIS — K92.2 GASTROINTESTINAL HEMORRHAGE, UNSPECIFIED GASTROINTESTINAL HEMORRHAGE TYPE: Primary | ICD-10-CM

## 2021-08-14 LAB
A/G RATIO: 1.5 (ref 1.1–2.2)
ABO/RH: NORMAL
ALBUMIN SERPL-MCNC: 3.8 G/DL (ref 3.4–5)
ALP BLD-CCNC: 44 U/L (ref 40–129)
ALT SERPL-CCNC: 12 U/L (ref 10–40)
ANION GAP SERPL CALCULATED.3IONS-SCNC: 10 MMOL/L (ref 3–16)
ANTIBODY SCREEN: NORMAL
APTT: 26.5 SEC (ref 26.2–38.6)
AST SERPL-CCNC: 13 U/L (ref 15–37)
BASOPHILS ABSOLUTE: 0 K/UL (ref 0–0.2)
BASOPHILS RELATIVE PERCENT: 0.4 %
BILIRUB SERPL-MCNC: 0.7 MG/DL (ref 0–1)
BUN BLDV-MCNC: 18 MG/DL (ref 7–20)
CALCIUM SERPL-MCNC: 9.1 MG/DL (ref 8.3–10.6)
CHLORIDE BLD-SCNC: 106 MMOL/L (ref 99–110)
CO2: 23 MMOL/L (ref 21–32)
CREAT SERPL-MCNC: 1.1 MG/DL (ref 0.8–1.3)
EOSINOPHILS ABSOLUTE: 0.1 K/UL (ref 0–0.6)
EOSINOPHILS RELATIVE PERCENT: 1.2 %
GFR AFRICAN AMERICAN: >60
GFR NON-AFRICAN AMERICAN: >60
GLOBULIN: 2.5 G/DL
GLUCOSE BLD-MCNC: 190 MG/DL (ref 70–99)
HCT VFR BLD CALC: 34.9 % (ref 40.5–52.5)
HCT VFR BLD CALC: 35.4 % (ref 40.5–52.5)
HEMOGLOBIN: 11.6 G/DL (ref 13.5–17.5)
IMMATURE RETIC FRACT: 0.4 (ref 0.21–0.37)
INR BLD: 1.19 (ref 0.88–1.12)
LYMPHOCYTES ABSOLUTE: 0.8 K/UL (ref 1–5.1)
LYMPHOCYTES RELATIVE PERCENT: 11.1 %
MCH RBC QN AUTO: 26.4 PG (ref 26–34)
MCHC RBC AUTO-ENTMCNC: 32.8 G/DL (ref 31–36)
MCV RBC AUTO: 80.6 FL (ref 80–100)
MONOCYTES ABSOLUTE: 0.4 K/UL (ref 0–1.3)
MONOCYTES RELATIVE PERCENT: 6.3 %
NEUTROPHILS ABSOLUTE: 5.7 K/UL (ref 1.7–7.7)
NEUTROPHILS RELATIVE PERCENT: 81 %
OCCULT BLOOD DIAGNOSTIC: ABNORMAL
PDW BLD-RTO: 14.9 % (ref 12.4–15.4)
PLATELET # BLD: 160 K/UL (ref 135–450)
PMV BLD AUTO: 9 FL (ref 5–10.5)
POTASSIUM REFLEX MAGNESIUM: 3.9 MMOL/L (ref 3.5–5.1)
PROTHROMBIN TIME: 13.5 SEC (ref 9.9–12.7)
RBC # BLD: 4.39 M/UL (ref 4.2–5.9)
RETICULOCYTE ABSOLUTE COUNT: 0.05 M/UL
RETICULOCYTE COUNT PCT: 1.17 % (ref 0.5–2.18)
SODIUM BLD-SCNC: 139 MMOL/L (ref 136–145)
TOTAL PROTEIN: 6.3 G/DL (ref 6.4–8.2)
WBC # BLD: 7 K/UL (ref 4–11)

## 2021-08-14 PROCEDURE — 85025 COMPLETE CBC W/AUTO DIFF WBC: CPT

## 2021-08-14 PROCEDURE — 6360000004 HC RX CONTRAST MEDICATION: Performed by: PHYSICIAN ASSISTANT

## 2021-08-14 PROCEDURE — 83540 ASSAY OF IRON: CPT

## 2021-08-14 PROCEDURE — 82746 ASSAY OF FOLIC ACID SERUM: CPT

## 2021-08-14 PROCEDURE — 82607 VITAMIN B-12: CPT

## 2021-08-14 PROCEDURE — 85730 THROMBOPLASTIN TIME PARTIAL: CPT

## 2021-08-14 PROCEDURE — 86901 BLOOD TYPING SEROLOGIC RH(D): CPT

## 2021-08-14 PROCEDURE — 2060000000 HC ICU INTERMEDIATE R&B

## 2021-08-14 PROCEDURE — 83550 IRON BINDING TEST: CPT

## 2021-08-14 PROCEDURE — 86900 BLOOD TYPING SEROLOGIC ABO: CPT

## 2021-08-14 PROCEDURE — 36415 COLL VENOUS BLD VENIPUNCTURE: CPT

## 2021-08-14 PROCEDURE — 82728 ASSAY OF FERRITIN: CPT

## 2021-08-14 PROCEDURE — 99283 EMERGENCY DEPT VISIT LOW MDM: CPT

## 2021-08-14 PROCEDURE — 74174 CTA ABD&PLVS W/CONTRAST: CPT

## 2021-08-14 PROCEDURE — 86850 RBC ANTIBODY SCREEN: CPT

## 2021-08-14 PROCEDURE — 85610 PROTHROMBIN TIME: CPT

## 2021-08-14 PROCEDURE — 85045 AUTOMATED RETICULOCYTE COUNT: CPT

## 2021-08-14 PROCEDURE — 80053 COMPREHEN METABOLIC PANEL: CPT

## 2021-08-14 PROCEDURE — 83036 HEMOGLOBIN GLYCOSYLATED A1C: CPT

## 2021-08-14 PROCEDURE — G0328 FECAL BLOOD SCRN IMMUNOASSAY: HCPCS

## 2021-08-14 RX ORDER — SODIUM CHLORIDE, SODIUM LACTATE, POTASSIUM CHLORIDE, CALCIUM CHLORIDE 600; 310; 30; 20 MG/100ML; MG/100ML; MG/100ML; MG/100ML
INJECTION, SOLUTION INTRAVENOUS CONTINUOUS
Status: ACTIVE | OUTPATIENT
Start: 2021-08-14 | End: 2021-08-15

## 2021-08-14 RX ADMIN — IOPAMIDOL 75 ML: 755 INJECTION, SOLUTION INTRAVENOUS at 20:54

## 2021-08-14 ASSESSMENT — ENCOUNTER SYMPTOMS
ABDOMINAL PAIN: 0
CHEST TIGHTNESS: 0
SHORTNESS OF BREATH: 0
BLOOD IN STOOL: 1
RECTAL PAIN: 0
CONSTIPATION: 0
ANAL BLEEDING: 1
RESPIRATORY NEGATIVE: 1
BACK PAIN: 0
COLOR CHANGE: 0
ABDOMINAL DISTENTION: 0
NAUSEA: 0
COUGH: 0
DIARRHEA: 0
VOMITING: 0

## 2021-08-15 LAB
A/G RATIO: 1.6 (ref 1.1–2.2)
ALBUMIN SERPL-MCNC: 3.5 G/DL (ref 3.4–5)
ALP BLD-CCNC: 37 U/L (ref 40–129)
ALT SERPL-CCNC: 10 U/L (ref 10–40)
ANION GAP SERPL CALCULATED.3IONS-SCNC: 9 MMOL/L (ref 3–16)
AST SERPL-CCNC: 10 U/L (ref 15–37)
BILIRUB SERPL-MCNC: 0.6 MG/DL (ref 0–1)
BUN BLDV-MCNC: 15 MG/DL (ref 7–20)
CALCIUM SERPL-MCNC: 8.9 MG/DL (ref 8.3–10.6)
CHLORIDE BLD-SCNC: 107 MMOL/L (ref 99–110)
CO2: 24 MMOL/L (ref 21–32)
CREAT SERPL-MCNC: 1.2 MG/DL (ref 0.8–1.3)
ESTIMATED AVERAGE GLUCOSE: 145.6 MG/DL
FERRITIN: 43.5 NG/ML (ref 30–400)
FOLATE: >20 NG/ML (ref 4.78–24.2)
GFR AFRICAN AMERICAN: >60
GFR NON-AFRICAN AMERICAN: 60
GLOBULIN: 2.2 G/DL
GLUCOSE BLD-MCNC: 120 MG/DL (ref 70–99)
GLUCOSE BLD-MCNC: 122 MG/DL (ref 70–99)
GLUCOSE BLD-MCNC: 122 MG/DL (ref 70–99)
GLUCOSE BLD-MCNC: 124 MG/DL (ref 70–99)
GLUCOSE BLD-MCNC: 128 MG/DL (ref 70–99)
GLUCOSE BLD-MCNC: 139 MG/DL (ref 70–99)
GLUCOSE BLD-MCNC: 161 MG/DL (ref 70–99)
HBA1C MFR BLD: 6.7 %
HCT VFR BLD CALC: 27.8 % (ref 40.5–52.5)
HCT VFR BLD CALC: 29 % (ref 40.5–52.5)
HCT VFR BLD CALC: 29 % (ref 40.5–52.5)
HCT VFR BLD CALC: 29.9 % (ref 40.5–52.5)
HEMOGLOBIN: 10 G/DL (ref 13.5–17.5)
HEMOGLOBIN: 9.5 G/DL (ref 13.5–17.5)
HEMOGLOBIN: 9.5 G/DL (ref 13.5–17.5)
HEMOGLOBIN: 9.6 G/DL (ref 13.5–17.5)
IRON SATURATION: 41 % (ref 20–50)
IRON: 107 UG/DL (ref 59–158)
MAGNESIUM: 2 MG/DL (ref 1.8–2.4)
PERFORMED ON: ABNORMAL
PHOSPHORUS: 3.5 MG/DL (ref 2.5–4.9)
POTASSIUM SERPL-SCNC: 3.8 MMOL/L (ref 3.5–5.1)
SODIUM BLD-SCNC: 140 MMOL/L (ref 136–145)
TOTAL IRON BINDING CAPACITY: 262 UG/DL (ref 260–445)
TOTAL PROTEIN: 5.7 G/DL (ref 6.4–8.2)
VITAMIN B-12: 791 PG/ML (ref 211–911)

## 2021-08-15 PROCEDURE — 6370000000 HC RX 637 (ALT 250 FOR IP): Performed by: INTERNAL MEDICINE

## 2021-08-15 PROCEDURE — 94760 N-INVAS EAR/PLS OXIMETRY 1: CPT

## 2021-08-15 PROCEDURE — 85014 HEMATOCRIT: CPT

## 2021-08-15 PROCEDURE — 84100 ASSAY OF PHOSPHORUS: CPT

## 2021-08-15 PROCEDURE — 2580000003 HC RX 258: Performed by: INTERNAL MEDICINE

## 2021-08-15 PROCEDURE — 2060000000 HC ICU INTERMEDIATE R&B

## 2021-08-15 PROCEDURE — 85018 HEMOGLOBIN: CPT

## 2021-08-15 PROCEDURE — C9113 INJ PANTOPRAZOLE SODIUM, VIA: HCPCS | Performed by: INTERNAL MEDICINE

## 2021-08-15 PROCEDURE — 6360000002 HC RX W HCPCS: Performed by: INTERNAL MEDICINE

## 2021-08-15 PROCEDURE — 83735 ASSAY OF MAGNESIUM: CPT

## 2021-08-15 PROCEDURE — 80053 COMPREHEN METABOLIC PANEL: CPT

## 2021-08-15 PROCEDURE — 36415 COLL VENOUS BLD VENIPUNCTURE: CPT

## 2021-08-15 RX ORDER — DOXAZOSIN MESYLATE 4 MG/1
8 TABLET ORAL NIGHTLY
Status: DISCONTINUED | OUTPATIENT
Start: 2021-08-15 | End: 2021-08-18 | Stop reason: HOSPADM

## 2021-08-15 RX ORDER — VALSARTAN AND HYDROCHLOROTHIAZIDE 320; 12.5 MG/1; MG/1
1 TABLET, FILM COATED ORAL DAILY
Status: DISCONTINUED | OUTPATIENT
Start: 2021-08-15 | End: 2021-08-15 | Stop reason: CLARIF

## 2021-08-15 RX ORDER — ACETAMINOPHEN 650 MG/1
650 SUPPOSITORY RECTAL EVERY 6 HOURS PRN
Status: DISCONTINUED | OUTPATIENT
Start: 2021-08-15 | End: 2021-08-18 | Stop reason: HOSPADM

## 2021-08-15 RX ORDER — DEXTROSE MONOHYDRATE 50 MG/ML
100 INJECTION, SOLUTION INTRAVENOUS PRN
Status: DISCONTINUED | OUTPATIENT
Start: 2021-08-15 | End: 2021-08-18 | Stop reason: HOSPADM

## 2021-08-15 RX ORDER — VALSARTAN 160 MG/1
320 TABLET ORAL DAILY
Status: DISCONTINUED | OUTPATIENT
Start: 2021-08-15 | End: 2021-08-18 | Stop reason: HOSPADM

## 2021-08-15 RX ORDER — SODIUM CHLORIDE 0.9 % (FLUSH) 0.9 %
5-40 SYRINGE (ML) INJECTION PRN
Status: DISCONTINUED | OUTPATIENT
Start: 2021-08-15 | End: 2021-08-18 | Stop reason: HOSPADM

## 2021-08-15 RX ORDER — ONDANSETRON 2 MG/ML
4 INJECTION INTRAMUSCULAR; INTRAVENOUS EVERY 6 HOURS PRN
Status: DISCONTINUED | OUTPATIENT
Start: 2021-08-15 | End: 2021-08-18 | Stop reason: HOSPADM

## 2021-08-15 RX ORDER — CLONIDINE HYDROCHLORIDE 0.1 MG/1
0.1 TABLET ORAL 2 TIMES DAILY
Status: DISCONTINUED | OUTPATIENT
Start: 2021-08-15 | End: 2021-08-18 | Stop reason: HOSPADM

## 2021-08-15 RX ORDER — NICOTINE POLACRILEX 4 MG
15 LOZENGE BUCCAL PRN
Status: DISCONTINUED | OUTPATIENT
Start: 2021-08-15 | End: 2021-08-18 | Stop reason: HOSPADM

## 2021-08-15 RX ORDER — ATORVASTATIN CALCIUM 20 MG/1
20 TABLET, FILM COATED ORAL NIGHTLY
Status: DISCONTINUED | OUTPATIENT
Start: 2021-08-15 | End: 2021-08-18 | Stop reason: HOSPADM

## 2021-08-15 RX ORDER — SODIUM CHLORIDE 0.9 % (FLUSH) 0.9 %
5-40 SYRINGE (ML) INJECTION EVERY 12 HOURS SCHEDULED
Status: DISCONTINUED | OUTPATIENT
Start: 2021-08-15 | End: 2021-08-18 | Stop reason: HOSPADM

## 2021-08-15 RX ORDER — ACETAMINOPHEN 325 MG/1
650 TABLET ORAL EVERY 6 HOURS PRN
Status: DISCONTINUED | OUTPATIENT
Start: 2021-08-15 | End: 2021-08-18 | Stop reason: HOSPADM

## 2021-08-15 RX ORDER — SODIUM CHLORIDE 9 MG/ML
25 INJECTION, SOLUTION INTRAVENOUS PRN
Status: DISCONTINUED | OUTPATIENT
Start: 2021-08-15 | End: 2021-08-18 | Stop reason: HOSPADM

## 2021-08-15 RX ORDER — ONDANSETRON 4 MG/1
4 TABLET, ORALLY DISINTEGRATING ORAL EVERY 8 HOURS PRN
Status: DISCONTINUED | OUTPATIENT
Start: 2021-08-15 | End: 2021-08-18 | Stop reason: HOSPADM

## 2021-08-15 RX ORDER — CARVEDILOL 25 MG/1
25 TABLET ORAL 2 TIMES DAILY WITH MEALS
Status: DISCONTINUED | OUTPATIENT
Start: 2021-08-15 | End: 2021-08-18 | Stop reason: HOSPADM

## 2021-08-15 RX ORDER — INSULIN LISPRO 100 [IU]/ML
0-6 INJECTION, SOLUTION INTRAVENOUS; SUBCUTANEOUS EVERY 4 HOURS
Status: DISCONTINUED | OUTPATIENT
Start: 2021-08-15 | End: 2021-08-16

## 2021-08-15 RX ORDER — PANTOPRAZOLE SODIUM 40 MG/10ML
40 INJECTION, POWDER, LYOPHILIZED, FOR SOLUTION INTRAVENOUS DAILY
Status: DISCONTINUED | OUTPATIENT
Start: 2021-08-15 | End: 2021-08-18 | Stop reason: HOSPADM

## 2021-08-15 RX ORDER — DEXTROSE MONOHYDRATE 25 G/50ML
12.5 INJECTION, SOLUTION INTRAVENOUS PRN
Status: DISCONTINUED | OUTPATIENT
Start: 2021-08-15 | End: 2021-08-18 | Stop reason: HOSPADM

## 2021-08-15 RX ORDER — M-VIT,TX,IRON,MINS/CALC/FOLIC 27MG-0.4MG
1 TABLET ORAL DAILY
Status: DISCONTINUED | OUTPATIENT
Start: 2021-08-15 | End: 2021-08-18 | Stop reason: HOSPADM

## 2021-08-15 RX ORDER — PEG-3350, SODIUM SULFATE, SODIUM CHLORIDE, POTASSIUM CHLORIDE, SODIUM ASCORBATE AND ASCORBIC ACID 7.5-2.691G
100 KIT ORAL ONCE
Status: COMPLETED | OUTPATIENT
Start: 2021-08-15 | End: 2021-08-15

## 2021-08-15 RX ORDER — HYDROCHLOROTHIAZIDE 25 MG/1
12.5 TABLET ORAL DAILY
Status: DISCONTINUED | OUTPATIENT
Start: 2021-08-15 | End: 2021-08-18 | Stop reason: HOSPADM

## 2021-08-15 RX ORDER — POTASSIUM CHLORIDE 7.45 MG/ML
10 INJECTION INTRAVENOUS PRN
Status: DISCONTINUED | OUTPATIENT
Start: 2021-08-15 | End: 2021-08-18 | Stop reason: HOSPADM

## 2021-08-15 RX ORDER — AMLODIPINE BESYLATE 5 MG/1
10 TABLET ORAL DAILY
Status: DISCONTINUED | OUTPATIENT
Start: 2021-08-15 | End: 2021-08-18 | Stop reason: HOSPADM

## 2021-08-15 RX ORDER — POTASSIUM CHLORIDE 20 MEQ/1
40 TABLET, EXTENDED RELEASE ORAL PRN
Status: DISCONTINUED | OUTPATIENT
Start: 2021-08-15 | End: 2021-08-18 | Stop reason: HOSPADM

## 2021-08-15 RX ORDER — LATANOPROST 50 UG/ML
1 SOLUTION/ DROPS OPHTHALMIC NIGHTLY
Status: DISCONTINUED | OUTPATIENT
Start: 2021-08-15 | End: 2021-08-18 | Stop reason: HOSPADM

## 2021-08-15 RX ADMIN — LATANOPROST 1 DROP: 50 SOLUTION OPHTHALMIC at 20:40

## 2021-08-15 RX ADMIN — ATORVASTATIN CALCIUM 20 MG: 20 TABLET, FILM COATED ORAL at 20:40

## 2021-08-15 RX ADMIN — LATANOPROST 1 DROP: 50 SOLUTION OPHTHALMIC at 01:44

## 2021-08-15 RX ADMIN — POLYETHYLENE GLYCOL 3350, SODIUM SULFATE, SODIUM CHLORIDE, POTASSIUM CHLORIDE, ASCORBIC ACID, SODIUM ASCORBATE 100 G: KIT at 23:02

## 2021-08-15 RX ADMIN — CLONIDINE HYDROCHLORIDE 0.1 MG: 0.1 TABLET ORAL at 20:40

## 2021-08-15 RX ADMIN — DOXAZOSIN 8 MG: 4 TABLET ORAL at 20:40

## 2021-08-15 RX ADMIN — INSULIN LISPRO 1 UNITS: 100 INJECTION, SOLUTION INTRAVENOUS; SUBCUTANEOUS at 19:03

## 2021-08-15 RX ADMIN — SODIUM CHLORIDE, POTASSIUM CHLORIDE, SODIUM LACTATE AND CALCIUM CHLORIDE: 600; 310; 30; 20 INJECTION, SOLUTION INTRAVENOUS at 14:27

## 2021-08-15 RX ADMIN — CLONIDINE HYDROCHLORIDE 0.1 MG: 0.1 TABLET ORAL at 01:44

## 2021-08-15 RX ADMIN — POLYETHYLENE GLYCOL 3350, SODIUM SULFATE, SODIUM CHLORIDE, POTASSIUM CHLORIDE, ASCORBIC ACID, SODIUM ASCORBATE 100 G: KIT at 16:28

## 2021-08-15 RX ADMIN — Medication 10 ML: at 20:40

## 2021-08-15 RX ADMIN — CARVEDILOL 25 MG: 25 TABLET, FILM COATED ORAL at 09:28

## 2021-08-15 RX ADMIN — CARVEDILOL 25 MG: 25 TABLET, FILM COATED ORAL at 16:28

## 2021-08-15 RX ADMIN — ATORVASTATIN CALCIUM 20 MG: 20 TABLET, FILM COATED ORAL at 01:44

## 2021-08-15 RX ADMIN — DOXAZOSIN 8 MG: 4 TABLET ORAL at 01:44

## 2021-08-15 RX ADMIN — SODIUM CHLORIDE, POTASSIUM CHLORIDE, SODIUM LACTATE AND CALCIUM CHLORIDE: 600; 310; 30; 20 INJECTION, SOLUTION INTRAVENOUS at 07:10

## 2021-08-15 RX ADMIN — PANTOPRAZOLE SODIUM 40 MG: 40 INJECTION, POWDER, FOR SOLUTION INTRAVENOUS at 01:45

## 2021-08-15 RX ADMIN — SODIUM CHLORIDE, POTASSIUM CHLORIDE, SODIUM LACTATE AND CALCIUM CHLORIDE: 600; 310; 30; 20 INJECTION, SOLUTION INTRAVENOUS at 00:21

## 2021-08-15 ASSESSMENT — PAIN SCALES - GENERAL
PAINLEVEL_OUTOF10: 0

## 2021-08-15 NOTE — PROGRESS NOTES
Pt stated home unit was not here. Was okay going without home unit for tonight. Refused our BiPAP at this time. Will let this RT know if wants BIPAP. Will monitor.     Electronically signed by Samantha Ewing RCP on 8/15/2021 at 7:43 PM

## 2021-08-15 NOTE — PROGRESS NOTES
Ht 6' 1\" (1.854 m)   Wt 283 lb 6.4 oz (128.5 kg)   SpO2 98%   BMI 37.39 kg/m²     Intake/Output Summary (Last 24 hours) at 8/15/2021 1256  Last data filed at 8/15/2021 0747  Gross per 24 hour   Intake --   Output 400 ml   Net -400 ml      Wt Readings from Last 3 Encounters:   08/15/21 283 lb 6.4 oz (128.5 kg)   05/28/21 298 lb 4.8 oz (135.3 kg)   05/17/21 293 lb (132.9 kg)       General appearance:  Appears comfortable  Eyes: Sclera clear. Pupils equal.  ENT: Moist oral mucosa. Trachea midline, no adenopathy. Cardiovascular: Regular rhythm, normal S1, S2. No murmur. No edema in lower extremities  Respiratory: Not using accessory muscles. Good inspiratory effort. Clear to auscultation bilaterally, no wheeze or crackles. GI: Abdomen soft, no tenderness, not distended, normal bowel sounds  Musculoskeletal: No cyanosis in digits, neck supple  Neurology: CN 2-12 grossly intact. No speech or motor deficits  Psych: Normal affect. Alert and oriented in time, place and person  Skin: Warm, dry, normal turgor    Labs and Tests:  CBC:   Recent Labs     08/14/21  1826 08/15/21  0454 08/15/21  1110   WBC 7.0  --   --    HGB 11.6* 9.6* 10.0*     --   --      BMP:    Recent Labs     08/14/21  1826 08/15/21  0454    140   K 3.9 3.8    107   CO2 23 24   BUN 18 15   CREATININE 1.1 1.2   GLUCOSE 190* 122*     Hepatic:   Recent Labs     08/14/21  1826 08/15/21  0454   AST 13* 10*   ALT 12 10   BILITOT 0.7 0.6   ALKPHOS 44 37*     CT abd/pelvis  1. No active contrast extravasation. 2. Diverticulosis without scan evidence for diverticulitis.             Problem List  Active Problems:    Acute lower GI hemorrhage  Resolved Problems:    * No resolved hospital problems. *       Assessment & Plan:   1. Lower GI bleed-Patient NPO. GI has been consulted. Possible diverticular bleed. Seems to be resolved at present time. 2. Acute blood less anemia-hemoglobin down almost 4 grams. Continue to monitor. 3. Hypertension-ok to give coreg, hold other antihypertensives for now.        Diet: Diet NPO Exceptions are: Ice Chips, Sips of Water with Meds  Code:Full Code  DVT PPXscd      Randall Aj PA-C   8/15/2021 12:56 PM

## 2021-08-15 NOTE — ED PROVIDER NOTES
I independently performed a history and physical on Fanta Quevedo. All diagnostic, treatment, and disposition decisions were made by myself in conjunction with the advanced practice provider. Briefly, this is a 70 y.o. male here for GI bleed. Initially yesterday he thought he had a episode of melanotic stool. This morning, he started having some diarrhea with blood. Eventually, he had been defecating what he described as nisa blood. By the time that I evaluated the patient, he states that the rate of bleeding has since improved. He did have some lower abdominal discomfort associated with this suprapubically. He had history of some polyps found on colonoscopy 2 or 3 years ago. No fever or chills. Does feel fatigued. At one point, he was nauseous however this is since resolved. On exam,   General: Patient is in no acute distress  Skin: No cyanosis  HEENT: Dry mucous membranes  Heart: Regular rate, regular rhythm  Lung: No respiratory distress  Abdomen: Soft, nontender  Neuro: Moving all extremities, no facial droop, no slurred speech, answers questions appropriately          Screenings            MDM  Briefly, this is a 70 y.o. male here for GI bleed. Bright red blood per rectum on exam.  Likely lower GI bleed. Has had two-point hemoglobin drop. CT abdomen pelvis with contrast shows some diverticula. Vital signs unremarkable. Patient feeling fatigued, nauseous, and occasional abdominal discomfort. Will admit to hospitalist service for possible GI consultation/colonoscopy/trending labs. .    Patient Referrals:  No follow-up provider specified. Discharge Medications:  New Prescriptions    No medications on file       FINAL IMPRESSION  1. Gastrointestinal hemorrhage, unspecified gastrointestinal hemorrhage type        Blood pressure 129/69, pulse 74, temperature 97.9 °F (36.6 °C), temperature source Oral, resp. rate 16, weight 290 lb (131.5 kg), SpO2 97 %.      For further details of Seferino Black Cody's emergency department encounter, please see documentation by advanced practice provider, Sonia.        Margarita Meza MD  08/14/21 [id number]

## 2021-08-15 NOTE — H&P
Hospital Medicine History and Physical    8/14/2021    Date of Admission: 8/14/2021    Date of Service: Pt seen/examined on 8/14/2021 and admitted to inpatient. Assessment/plan:  1. Acute lower GI bleed. Likely diverticular bleed. Start clear diet tonight, n.p.o. after midnight. Start bowel prep with golytely. Start lactated Ringer. Obtain serial H&H, monitor hemoglobin closely. Consult initiated to GI to assist with management. 2. Acute blood loss anemia. Secondary to #1. Currently holding aspirin. Monitor serial H&H with plan to transfuse to maintain hemoglobin greater than 7.0. Iron studies pending. 3. Other comorbidities: History of CAD, well-controlled diabetes type 2, essential hypertension, obstructive sleep apnea on CPAP, diverticulosis, obesity with BMI of 38 kg/m². Activities: Bedrest  Prophylaxis: SCDs, PPI  Code status: Full code    ==========================================================  Chief complaint:  Chief Complaint   Patient presents with    Rectal Bleeding     PT presents with c/o stool in blood since 0500 this AM.        History of Presenting Illness: This is a pleasant 70 y.o. male who is vaccinated for COVID-19 (last dose of moderna vaccine was in February of 2021), with history of CAD (on aspirin), well-controlled diabetes type 2, diverticulosis, essential hypertension, obstructive sleep apnea on CPAP, obesity with BMI of 38 kg/m², who presents to the emergency room with complaints of bloody stool, which has since progressed to bright red blood per rectum, onset since 6:00 AM - started as a firm stool, then softer dark stool, and much later, bright red blood per rectum. He also reports lower abdominal pain. Presentation to the emergency room, he has had some diaphoresis, dizziness and nausea. His hemoglobin is noted to be 11.6, have been dropped about 2 g from baseline of around 13.8. Occult stool was positive in the emergency room.   Patient is being admitted for further management. Past Medical History:      Diagnosis Date    CAD (coronary artery disease) 7/9/2004    Coronary artery disease involving native coronary artery of native heart without angina pectoris     2004 cath > Taxus stents distal/prox RCA 2006 Myoview> EF 69%, small inferior fixed defect distal RCA     Diabetes mellitus with microalbuminuric diabetic nephropathy ([facility] [address] 05/01/2015    157    Diverticulosis of colon 08/31/2018    moderate diffuse    DM type 2 (diabetes mellitus, type 2) ([address] 7/7/2014    ED (erectile dysfunction)     Epistaxis 01/01/2012    cautry    Homocysteinemia ([facility] [address] 09/24/2014    13    HTN (hypertension), benign 01/01/2001    moderate concentric left ventricular hypertrophy 2/16/17    Hyperlipidemia LDL goal < 70 7/12/2004    Low testosterone 05/17/2013    204    MI (myocardial infarction) ([facility] [address] 07/09/2004    with 2 stents RCA prox and distal    Neurofibromatosis ([facility] [address]     right leg lateral neurofibroma    ANTONIETTA (obstructive sleep apnea) 06/19/2013    CPAP      PSA elevation 1/1/2000    No Bx    Sensorineural hearing loss, bilateral 7/3/2013    right > left    Vitamin D deficiency 01/27/2014    26       Past Surgical History:      Procedure Laterality Date    CARDIAC SURGERY  2004    STENTS    COLONOSCOPY  01/01/2006    NO POLYPS    COLONOSCOPY N/A 08/31/2018    Tubular adenoma. Every 5 year colonoscopy. Rectal bleeding for 4 days postop.  CORONARY ANGIOPLASTY WITH STENT PLACEMENT  7/12/2004    2 stents in RCA    [facility] SURGERY Left ~2001    [facility]  Lat calf & lat     NASAL HEMORRHAGE CONTROL  1/1/2012       Medications (prior to admission):  Prior to Admission medications    Medication Sig Start Date End Date Taking?  Authorizing Provider   carvedilol (COREG) 25 MG tablet TAKE 1 TABLET BY MOUTH TWICE A DAY 8/9/21   Montse Buitrago MD   atorvastatin (LIPITOR) 20 MG tablet TAKE 1 TABLET BY MOUTH EVERY DAY 8/9/21   Montse Buitrago MD   amLODIPine (NORVASC) 10 MG tablet TAKE 1 TABLET BY MOUTH EVERY DAY 8/9/21   Lucie Queen MD   doxazosin (CARDURA) 8 MG tablet TAKE 1 TABLET BY MOUTH EVERY NIGHT 7/15/21   Lucie Queen MD   cloNIDine (CATAPRES) 0.1 MG tablet TAKE 1 TABLET BY MOUTH TWICE A DAY 5/10/21   Lucie Queen MD   valsartan-hydrochlorothiazide (DIOVAN-HCT) 320-12.5 MG per tablet TAKE 1 TABLET BY MOUTH DAILY 1/11/21   Lucie Queen MD   sildenafil (VIAGRA) 50 MG tablet Take 1 tablet by mouth daily as needed for Erectile Dysfunction  Patient not taking: Reported on 5/17/2021 8/25/20   Miranda Rosenberg MD   [facility] MISC Test blood sugar twice daily. 8/18/14   Historical Provider, MD   travoprost, benzalkonium, (TRAVATAN) 0.004 % ophthalmic solution Place 1 drop into both eyes nightly. Historical Provider, MD   glucose blood VI test strips (ASCENSIA AUTODISC VI;ONE TOUCH ULTRA TEST VI) strip 1 each by In Vitro route 2 times daily. As needed. 8/18/14   Yoly Louis DO   Blood Glucose Monitoring Suppl (BLOOD GLUCOSE METER) KIT 1 Device by Does not apply route 2 times daily. 7/7/14   Yoly Louis DO   aspirin 81 MG tablet Take 81 mg by mouth daily. Historical Provider, MD   therapeutic multivitamin-minerals (THERAGRAN-M) tablet Take 1 tablet by mouth daily. Historical Provider, MD       Allergy(ies):  Patient has no known allergies. Social History:  TOBACCO:  reports that he quit smoking about 45 years ago. His smoking use included pipe. He smoked 0.00 packs per day for 30.00 years. He has never used smokeless tobacco.  ETOH:  reports current alcohol use. Family History:      Problem Relation Age of Onset    High Blood Pressure Mother     High Blood Pressure Father     Lung Cancer Father         Smoker    Stroke Brother         Brain aneurysm    Hypertension Brother     Other Brother         Prostate    No Known Problems Son        Review of Systems:  Pertinent positives are listed in HPI.  At least 10-point ROS Initial Relevant Medical/Surgical History: Rectal Bleeding (PT presents with c/o stool in blood since 0500 this AM. ) FINDINGS: The visualized lung bases are clear. No active arterial or venous phase contrast extravasation is identified. There are descending and sigmoid colon diverticula without evidence for diverticulitis. The appendix is normal.  Small bowel is normal in caliber. The liver, spleen, pancreas, gallbladder, adrenal glands and kidneys are unremarkable. Aortic caliber is normal without dissection. A replaced left hepatic artery is noted. There is no adenopathy, mesenteric stranding or free fluid. The urinary bladder is grossly negative. There is no acute osseous abnormality. There is a small fatty umbilical hernia which also contains what may be a small amount of fluid. 1. No active contrast extravasation. 2. Diverticulosis without scan evidence for diverticulitis. Discussed with ER provider.       Thank you Stanley Pratt MD for the opportunity to be involved in this patient's care.    -----------------------------  Eduardo Mejia MD  [employer] Hospitalist

## 2021-08-15 NOTE — CONSULTS
GI Consult Note      Admission Date: 8/14/2021  Hospital Day: Hospital Day: 2  Attending: Bharati George MD  Date of service: 8/15/21    Subjective:     Chief complaint/ Reason for consult:   GI bleed       HPI: Jaime Spivey is a 70 y.o.  male patient, who was seen at the request of Dr. Bharati George MD.    History was obtained from chart review and the patient. 70-year-old -American male who presents for new onset hematochezia. Patient states that yesterday morning about 6 in the morning he had fecal urgency and dark stool. About an hour later he had another bowel movement that was without blood. He had a third bowel movement that was mixed blood and stool. He had another bowel meant to follow with loose stool and bright red blood. He decided go to the hospital for further evaluation. Upon presentation he had a CT angio which showed diverticulosis but no active bleeding. Hemoglobin dropped from a baseline 13 down to 10 and then down to 9.6 today. He has had no bowel movement since 16 o'clock yesterday. Denies any weight loss, abdominal pain, melena. He takes aspirin but no other anticoagulation or NSAIDs. He had a colonoscopy in August 2017 which showed 1 polyp and a poor prep so was aborted. He repeated the colonoscopy with Dr. Rudy Hodges  in August 2018 with findings of a 8 mm sigmoid polyp and diverticulosis. The polyp was adenomatous.   He was told to repeat his colonoscopy in 5 years            Past Endoscopic History:  As above                     Past Medical History:     Past Medical History:   Diagnosis Date    CAD (coronary artery disease) 7/9/2004    Coronary artery disease involving native coronary artery of native heart without angina pectoris     2004 cath > Taxus stents distal/prox RCA 2006 Myoview> EF 69%, small inferior fixed defect distal RCA     Diabetes mellitus with microalbuminuric diabetic nephropathy ([facility] [address] 05/01/2015    157    Diverticulosis of colon 08/31/2018    moderate diffuse    DM type 2 (diabetes mellitus, type 2) ([facility] [address] 7/7/2014    ED (erectile dysfunction)     Epistaxis 01/01/2012    cautry    Homocysteinemia [facility] 09/24/2014    13    HTN (hypertension), benign 01/01/2001    moderate concentric left ventricular hypertrophy 2/16/17    Hyperlipidemia LDL goal < 70 7/12/2004    Low testosterone 05/17/2013    204    MI (myocardial infarction) ([facility] [address] 07/09/2004    with 2 stents RCA prox and distal    Neurofibromatosis ([facility] [address]     right leg lateral neurofibroma    ANTONIETTA (obstructive sleep apnea) 06/19/2013    CPAP      PSA elevation 1/1/2000    No Bx    Sensorineural hearing loss, bilateral 7/3/2013    right > left    Vitamin D deficiency 01/27/2014    26       Past Surgical History:    Past Surgical History:   Procedure Laterality Date    CARDIAC SURGERY  2004    STENTS    COLONOSCOPY  01/01/2006    NO POLYPS    COLONOSCOPY N/A 08/31/2018    Tubular adenoma. Every 5 year colonoscopy. Rectal bleeding for 4 days postop.  CORONARY ANGIOPLASTY WITH STENT PLACEMENT  7/12/2004    2 stents in RCA    LEG SURGERY Left ~2001    [facility]  Lat calf & lat     NASAL HEMORRHAGE CONTROL  1/1/2012         Social History:     · Tobacco use:   reports that he quit smoking about 45 years ago. His smoking use included pipe. He smoked 0.00 packs per day for 30.00 years. He has never used smokeless tobacco.  · Alcohol use:   reports current alcohol use. · Currently lives in: [address] [place] [address]  ·  reports no history of drug use.    ·       Family History:       Problem Relation Age of Onset    High Blood Pressure Mother     High Blood Pressure Father     Lung Cancer Father         Smoker    Stroke Brother         Brain aneurysm    Hypertension Brother     Other Brother         Prostate    No Known Problems Son          Medications:    amLODIPine  10 mg Oral Daily    atorvastatin  20 mg Oral Nightly    carvedilol  25 mg Oral BID WC    cloNIDine  0.1 mg Oral BID    doxazosin  8 mg Oral Nightly    therapeutic multivitamin-minerals  1 tablet Oral Daily    sodium chloride flush  5-40 mL Intravenous 2 times per day    pantoprazole  40 mg Intravenous Daily    insulin lispro  0-6 Units Subcutaneous Q4H    latanoprost  1 drop Both Eyes Nightly    valsartan  320 mg Oral Daily    And    hydroCHLOROthiazide  12.5 mg Oral Daily    PEG-KCl-NaCl-NaSulf-Na Asc-C  100 g Oral Once    PEG-KCl-NaCl-NaSulf-Na Asc-C  100 g Oral Once            REVIEW OF SYSTEMS:       Pertinent items are noted in HPI. Objective:   PHYSICAL EXAM:      Vitals:   Vitals:    08/15/21 0100 08/15/21 0445 08/15/21 0915 08/15/21 1117   BP:  108/64 (!) 152/76 120/60   Pulse:  82 88 77   Resp:  16 16 16   Temp:  98.6 °F (37 °C) 98.7 °F (37.1 °C) 98.1 °F (36.7 °C)   TempSrc:  Oral Axillary Oral   SpO2:  97% 98% 98%   Weight:       Height: 6' 1\" (1.854 m)          General appearance: alert, cooperative, no distress, appears stated age  Eyes: Anicteric  Head: Normocephalic, without obvious abnormality  Lungs: clear to auscultation bilaterally, Normal Effort  Heart: regular rate and rhythm, normal S1 and S2, no murmurs or rubs  Abdomen: soft, non-tender. Bowel sounds normal. No masses,  no organomegaly. Extremities: atraumatic, no cyanosis or edema  Skin: warm and dry, no jaundice  Neuro: Grossly intact, A&OX3    Intake and output:   I/O last 3 completed shifts:   In: 1981.8 [I.V.:1981.8]  Out: 400 [Urine:400]    Lab Data:      CBC:   Recent Labs     08/14/21  1826 08/15/21  0454 08/15/21  1110   WBC 7.0  --   --    RBC 4.39  --   --    HGB 11.6* 9.6* 10.0*   HCT 34.9*  35.4* 29.0* 29.9*     --   --    MCV 80.6  --   --    MCH 26.4  --   --    MCHC 32.8  --   --    RDW 14.9  --   --         BMP:  Recent Labs     08/14/21  1826 08/15/21  0454    140   K 3.9 3.8    107   CO2 23 24   BUN 18 15   CREATININE 1.1 1.2   CALCIUM 9.1 8.9   GLUCOSE 190* 122*        Hepatic Function Panel: Recent Labs     08/14/21  1826 08/15/21  0454   AST 13* 10*   ALT 12 10   BILITOT 0.7 0.6   ALKPHOS 44 37*       No results for input(s): LIPASE, AMYLASE in the last 72 hours. Recent Labs     08/14/21 1826   PROTIME 13.5*   INR 1.19*     No results for input(s): PTT in the last 72 hours. No results for input(s): OCCULTBLD in the last 72 hours. Imaging:    CTA ABDOMEN PELVIS W WO CONTRAST   Final Result   1. No active contrast extravasation. 2. Diverticulosis without scan evidence for diverticulitis. Known drug Allergies:   No Known Allergies        Assessment:   The patient is a 70 y.o. old male  with following problems:    1. Painless hematochezia-patient had multiple episodes of bright red blood per rectum yesterday morning. Denies any melena. He has had no bowel movement since 16:00 yesterday. CT angio was negative for active bleeding but did show diverticulosis. Last colonoscopy was in August 2018 with diverticulosis and one 8 mm adenomatous polyp removed from sigmoid colon. 2.  Anemia due to acute GI blood loss-hemoglobin went from baseline of around 13 down to 9.6. Recommendations:   1.   Colonoscopy on 8/16/21      Mert Vleoz MD  [facility]   08/15/21

## 2021-08-15 NOTE — ED PROVIDER NOTES
[facility]        Pt Name: Luis Manuel Ramos  MRN: [id number]  [place] 1950  Date of evaluation: 8/14/2021  Provider: ANTONINO Alonso  PCP: Paul Cotton MD  Note Started: 9:17 PM EDT        I have seen and evaluated this patient with my supervising physician Nj Gonzalez       Chief Complaint   Patient presents with    Rectal Bleeding     PT presents with c/o stool in blood since 0500 this AM.        HISTORY OF PRESENT ILLNESS   (Location, Timing/Onset, Context/Setting, Quality, Duration, Modifying Factors, Severity, Associated Signs and Symptoms)  Note limiting factors. Chief Complaint: Miguel Angel Woodard is a 70 y.o. male with past medical history of CAD, diabetes, hypertension, hyperlipidemia, obstructive sleep apnea who presents to the ED with complaint of rectal bleeding. Patient states today he noticed some bright red blood in his stool since 5:00 this morning. States is gradually gotten worse throughout the day. Patient initially started as some slight blood when he wiped but states now he is having clots in the toilet bowl. Patient states he is on aspirin but denies any other blood thinners. Denies any injury or trauma to the rectum. Denies a history of fissure, fistula or hemorrhoid. Patient is he has had colonoscopy in the past but denies any history of diverticulosis or diverticulitis. Denies any significant history of GI bleed in the past.  Patient dates he was told he had polyps on his most recent colonoscopy a couple years ago. Patient denies any abdominal pain. States started feeling lightheaded today. Denies any syncope or near syncope. Denies dizziness, headache, chest pain, shortness of breath, nausea/vomiting, urinary symptoms or changes in bowel movements. Denies any foreign body insertion in the rectum.     Nursing Notes were all reviewed and agreed with or any disagreements were addressed in stents RCA prox and distal    Neurofibromatosis ([address]     right leg lateral neurofibroma    ANTONIETTA (obstructive sleep apnea) 06/19/2013    CPAP      PSA elevation 1/1/2000    No Bx    Sensorineural hearing loss, bilateral 7/3/2013    right > left    Vitamin D deficiency 01/27/2014    26         SURGICAL HISTORY     Past Surgical History:   Procedure Laterality Date    CARDIAC SURGERY  2004    STENTS    COLONOSCOPY  01/01/2006    NO POLYPS    COLONOSCOPY N/A 08/31/2018    Tubular adenoma. Every 5 year colonoscopy. Rectal bleeding for 4 days postop.  CORONARY ANGIOPLASTY WITH STENT PLACEMENT  7/12/2004    2 stents in RCA    LEG SURGERY Left ~2001    [facility]  Lat calf & lat     NASAL HEMORRHAGE CONTROL  1/1/2012         CURRENTMEDICATIONS       Current Discharge Medication List      CONTINUE these medications which have NOT CHANGED    Details   atorvastatin (LIPITOR) 20 MG tablet TAKE 1 TABLET BY MOUTH EVERY DAY  Qty: 30 tablet, Refills: 0      cloNIDine (CATAPRES) 0.1 MG tablet TAKE 1 TABLET BY MOUTH TWICE A DAY  Qty: 180 tablet, Refills: 1      aspirin 81 MG tablet Take 81 mg by mouth daily. carvedilol (COREG) 25 MG tablet TAKE 1 TABLET BY MOUTH TWICE A DAY  Qty: 60 tablet, Refills: 0      amLODIPine (NORVASC) 10 MG tablet TAKE 1 TABLET BY MOUTH EVERY DAY  Qty: 30 tablet, Refills: 0      doxazosin (CARDURA) 8 MG tablet TAKE 1 TABLET BY MOUTH EVERY NIGHT  Qty: 90 tablet, Refills: 2      valsartan-hydrochlorothiazide (DIOVAN-HCT) 320-12.5 MG per tablet TAKE 1 TABLET BY MOUTH DAILY  Qty: 90 tablet, Refills: 3      sildenafil (VIAGRA) 50 MG tablet Take 1 tablet by mouth daily as needed for Erectile Dysfunction  Qty: 10 tablet, Refills: 3      ONETOUCH DELICA LANCETS 56R MISC Test blood sugar twice daily. Refills: 3      travoprost, benzalkonium, (TRAVATAN) 0.004 % ophthalmic solution Place 1 drop into both eyes nightly.       glucose blood VI test strips (ASCENSIA AUTODISC VI;ONE TOUCH ULTRA TEST VI) strip 1 each by In Vitro route 2 times daily. As needed. Qty: 100 each, Refills: 3    Comments: Test strips to correspond with glucose meter  Associated Diagnoses: DM type 2 (diabetes mellitus, type 2) ([facility])      Blood Glucose Monitoring Suppl (BLOOD GLUCOSE METER) KIT 1 Device by Does not apply route 2 times daily. Qty: 1 kit, Refills: 0    Associated Diagnoses: DM type 2 (diabetes mellitus, type 2) ([facility])      therapeutic multivitamin-minerals (THERAGRAN-M) tablet Take 1 tablet by mouth daily. ALLERGIES     Patient has no known allergies. FAMILYHISTORY       Family History   Problem Relation Age of Onset    High Blood Pressure Mother     High Blood Pressure Father     Lung Cancer Father         Smoker    Stroke Brother         Brain aneurysm    Hypertension Brother     Other Brother         Prostate    No Known Problems Son           SOCIAL HISTORY       Social History     Tobacco Use    Smoking status: Former Smoker     Packs/day: 0.00     Years: 30.00     Pack years: 0.00     Types: Pipe     Quit date: 1976     Years since quittin.6    Smokeless tobacco: Never Used    Tobacco comment: 30-33 year 2 bowls /day   Vaping Use    Vaping Use: Never used   Substance Use Topics    Alcohol use: Yes     Alcohol/week: 0.0 standard drinks     Comment: Wine occasionally ie wedding/business event. Was drinking mid 34's 9-8 scotch 1 yr.  Drug use: No     Comment: Tried MJ / hash.         SCREENINGS    Crawford Coma Scale  Eye Opening: Spontaneous  Best Verbal Response: Oriented  Best Motor Response: Obeys commands  Lisa Coma Scale Score: 15        PHYSICAL EXAM    (up to 7 for level 4, 8 or more for level 5)     ED Triage Vitals   BP Temp Temp Source Pulse Resp SpO2 Height Weight   21 1743 21 1746 21 1743 21 1743 21 1743 21 1743 -- 21 1743   129/69 97.9 °F (36.6 °C) Oral 74 16 97 %  290 lb (131.5 kg)       Physical Exam  Constitutional:       General: He is not in acute distress. Appearance: Normal appearance. He is well-developed. He is not ill-appearing, toxic-appearing or diaphoretic. HENT:      Head: Normocephalic and atraumatic. Right Ear: External ear normal.      Left Ear: External ear normal.      Mouth/Throat:      Mouth: Mucous membranes are moist.      Pharynx: No oropharyngeal exudate or posterior oropharyngeal erythema. Eyes:      General:         Right eye: No discharge. Left eye: No discharge. Conjunctiva/sclera: Conjunctivae normal.   Cardiovascular:      Rate and Rhythm: Normal rate and regular rhythm. Pulses: Normal pulses. Heart sounds: Normal heart sounds. No murmur heard. No friction rub. No gallop. Comments: 2+ radial pulses bilaterally. No pedal edema. No calf tenderness. No JVD. Pulmonary:      Effort: Pulmonary effort is normal. No respiratory distress. Breath sounds: Normal breath sounds. No stridor. No wheezing, rhonchi or rales. Chest:      Chest wall: No tenderness. Abdominal:      General: Abdomen is flat. Bowel sounds are normal. There is no distension. Palpations: Abdomen is soft. There is no mass. Tenderness: There is no abdominal tenderness. There is no right CVA tenderness, left CVA tenderness, guarding or rebound. Negative signs include Estrada's sign, Rovsing's sign and McBurney's sign. Hernia: No hernia is present. Genitourinary:     Comments: Rectal exam performed by myself. There is no obvious fissure, fistula or hemorrhoid noted. No tenderness palpation of the rectum. Did have brown stool with bright red blood noted. No mass noted. Musculoskeletal:         General: Normal range of motion. Cervical back: Normal range of motion and neck supple. Skin:     General: Skin is warm and dry. Coloration: Skin is not pale. Findings: No erythema or rash. Neurological:      Mental Status: He is alert and oriented to person, place, and time. Psychiatric:         Behavior: Behavior normal.         DIAGNOSTIC RESULTS   LABS:    Labs Reviewed   CBC WITH AUTO DIFFERENTIAL - Abnormal; Notable for the following components:       Result Value    Hemoglobin 11.6 (*)     Hematocrit 35.4 (*)     Lymphocytes Absolute 0.8 (*)     All other components within normal limits    Narrative:     Performed at:  [facility] [address] [place], [place]   Phone [phone]   COMPREHENSIVE METABOLIC PANEL W/ REFLEX TO MG FOR LOW K - Abnormal; Notable for the following components:    Glucose 190 (*)     Total Protein 6.3 (*)     AST 13 (*)     All other components within normal limits    Narrative:     Performed at:  [facility] [address], [place]   Phone [phone]   PROTIME-INR - Abnormal; Notable for the following components:    Protime 13.5 (*)     INR 1.19 (*)     All other components within normal limits    Narrative:     Performed at:  [facility] [address], [place]   Phone [phone]   BLOOD OCCULT STOOL DIAGNOSTIC - Abnormal; Notable for the following components:    Occult Blood Diagnostic   (*)     Value: Result: POSITIVE  Normal range: Negative      All other components within normal limits    Narrative:     ORDER#: [id number]                          ORDERED BY: LILLI ENCISO  SOURCE: Stool Formed                       COLLECTED:  08/14/21 18:26  ANTIBIOTICS AT [place].:                      RECEIVED :  08/14/21 18:48  Performed at:  [facility] [address]   Phone [phone]   RETICULOCYTES - Abnormal; Notable for the following components:    Immature Retic Fract 0.40 (*)     Hematocrit 34.9 (*)     All other components within normal limits    Narrative:     Performed at:  [facility] [address] [place] [address]   Phone [phone]   COMPREHENSIVE METABOLIC PANEL - Abnormal; Notable for the following components:    Glucose 122 (*)     GFR Non- 60 (*)     Total Protein 5.7 (*)     Alkaline Phosphatase 37 (*)     AST 10 (*)     All other components within normal limits    Narrative:     Performed at:  [facility] [address]   Phone [phone]   HEMOGLOBIN AND HEMATOCRIT, BLOOD - Abnormal; Notable for the following components:    Hemoglobin 9.6 (*)     Hematocrit 29.0 (*)     All other components within normal limits    Narrative:     Performed at:  [facility] [address], [address]   Phone [phone]   POCT GLUCOSE - Abnormal; Notable for the following components:    POC Glucose 128 (*)     All other components within normal limits    Narrative:     Performed at:  [facility] [address] [place], [place]   Phone [phone]   POCT GLUCOSE - Abnormal; Notable for the following components:    POC Glucose 124 (*)     All other components within normal limits    Narrative:     Performed at:  [facility] [address]   Phone [phone]   POCT GLUCOSE - Abnormal; Notable for the following components:    POC Glucose 120 (*)     All other components within normal limits    Narrative:     Performed at:  [facility] [address], [place]   Phone [phone]   APTT    Narrative:     Performed at:  [facility] [address]   Phone [phone]   IRON AND TIBC    Narrative:     Performed at:  [facility]  [facility] [place] [address] [place], [place] [address]   Phone [phone]   FERRITIN    Narrative:     Performed at:  [facility]  [address] [place] [address]   Phone [phone]   VITAMIN B12 & FOLATE    Narrative:     Performed at:  [facility]  [address] [place] [address]   Phone [phone]   MAGNESIUM    Narrative:     Performed at:  [facility]  [address] [facility], [address] [facility]   Phone [phone]   PHOSPHORUS    Narrative:     Performed at:  [facility]  [address] [facility], [address] [facility]   Phone [phone]   HEMOGLOBIN AND HEMATOCRIT, BLOOD   HEMOGLOBIN A1C   HEMOGLOBIN AND HEMATOCRIT, BLOOD   POCT GLUCOSE   POCT GLUCOSE   POCT GLUCOSE   POCT GLUCOSE   POCT GLUCOSE   POCT GLUCOSE   TYPE AND SCREEN    Narrative:     Performed at:  [facility]  [address] [facility], [facility]   Phone [phone]       When ordered only abnormal lab results are displayed. All other labs were within normal range or not returned as of this dictation. EKG: When ordered, EKG's are interpreted by the Emergency Department Physician in the absence of a cardiologist.  Please see their note for interpretation of EKG. RADIOLOGY:   Non-plain film images such as CT, Ultrasound and MRI are read by the radiologist. Plain radiographic images are visualized and preliminarily interpreted by the ED Provider with the below findings:        Interpretation per the Radiologist below, if available at the time of this note:     [facility] [address]   Final Result   1. No active contrast extravasation. 2. Diverticulosis without scan evidence for diverticulitis. No results found.         PROCEDURES   Unless otherwise noted below, none     Procedures    CRITICAL CARE TIME   N/A    CONSULTS:  IP CONSULT TO HOSPITALIST  IP CONSULT TO GI      EMERGENCY DEPARTMENT COURSE and DIFFERENTIAL DIAGNOSIS/MDM:   Vitals:    Vitals:    08/15/21 0030 08/15/21 0100 08/15/21 0445 08/15/21 0915   BP: (!) 154/88  108/64 (!) 152/76   Pulse: 83  82 88   Resp: 16  16 16   Temp: 98.4 °F (36.9 °C)  98.6 °F (37 °C) 98.7 °F (37.1 °C)   TempSrc: Oral  Oral Axillary   SpO2: 99%  97% 98%   Weight: 283 lb 6.4 oz (128.5 kg)      Height:  6' 1\" (1.854 m)         Patient was given the following medications:  Medications   lactated ringers infusion ( Intravenous New Bag 8/15/21 0710)   amLODIPine (NORVASC) tablet 10 mg (0 mg Oral Held 8/15/21 0940)   atorvastatin (LIPITOR) tablet 20 mg (20 mg Oral Given 8/15/21 0144)   carvedilol (COREG) tablet 25 mg (25 mg Oral Given 8/15/21 0928)   cloNIDine (CATAPRES) tablet 0.1 mg (0 mg Oral Held 8/15/21 0940)   doxazosin (CARDURA) tablet 8 mg (8 mg Oral Given 8/15/21 0144)   therapeutic multivitamin-minerals 1 tablet (0 tablets Oral Held 8/15/21 0940)   potassium chloride (KLOR-CON M) extended release tablet 40 mEq (has no administration in time range)     Or   potassium bicarb-citric acid (EFFER-K) effervescent tablet 40 mEq (has no administration in time range)     Or   potassium chloride 10 mEq/100 mL IVPB (Peripheral Line) (has no administration in time range)   sodium chloride flush 0.9 % injection 5-40 mL (5 mLs Intravenous Not Given 8/15/21 0940)   sodium chloride flush 0.9 % injection 5-40 mL (has no administration in time range)   0.9 % sodium chloride infusion (has no administration in time range)   ondansetron (ZOFRAN-ODT) disintegrating tablet 4 mg (has no administration in time range)     Or   ondansetron (ZOFRAN) injection 4 mg (has no administration in time range)   acetaminophen (TYLENOL) tablet 650 mg (has no administration in time range)     Or   acetaminophen (TYLENOL) suppository 650 mg (has no administration in time range)   pantoprazole (PROTONIX) injection 40 mg (40 mg Intravenous Given 8/15/21 7235)   insulin lispro (1 Unit Dial) 0-6 Units (0 Units Subcutaneous Held 8/15/21 0406)   glucose (GLUTOSE) 40 % oral gel 15 g (has no administration in time range)   dextrose 50 % IV solution (has no administration in time range)   glucagon (rDNA) injection 1 mg (has no administration in time range)   dextrose 5 % solution (has no administration in time range)   polyethylene glycol (GoLYTELY) solution 4,000 mL (0 mLs Oral Held 8/15/21 0211)   latanoprost (XALATAN) 0.005 % ophthalmic solution 1 drop (1 drop Both Eyes Given 8/15/21 0144)   valsartan (DIOVAN) tablet 320 mg (0 mg Oral Held 8/15/21 0940)     And   hydroCHLOROthiazide (HYDRODIURIL) tablet 12.5 mg (0 mg Oral Held 8/15/21 0940)   iopamidol (ISOVUE-370) 76 % injection 75 mL (75 mLs Intravenous Given 8/14/21 2054)           Patient 75-year-old male who presents to the ED with complaint of rectal bleeding. Is on aspirin. No other blood thinners. He is medically stable here in the ED. CBC did show hemoglobin 11.6 slightly decreased from previous. CMP unremarkable. Type and screen obtained. Coags obtained. Hemoccult was positive. CT of the abdomen pelvis for GI bleed protocol showed no active contrast extravasation. Diverticulosis without diverticulitis. Likely some from lower GI bleed given bright red blood. Could potential be due to internal hemorrhoid but also could be due to diverticular bleed at this time. Given patient's age with complaints that he feels lightheaded with decreasing hemoglobin from baseline believe would benefit from admission for further evaluation and trending of hemoglobin. Case will be signed out to attending provider given end of shift. Please see attending provider note for further disposition and treatment of patient. FINAL IMPRESSION      1. Gastrointestinal hemorrhage, unspecified gastrointestinal hemorrhage type          DISPOSITION/PLAN   DISPOSITION Admitted 08/14/2021 11:07:23 PM      PATIENT REFERRED TO:  No follow-up provider specified.     DISCHARGE MEDICATIONS:  Current Discharge Medication List          DISCONTINUED MEDICATIONS:  Current

## 2021-08-16 ENCOUNTER — ANESTHESIA EVENT (OUTPATIENT)
Dept: ENDOSCOPY | Age: 71
DRG: 374 | End: 2021-08-16
Payer: MEDICARE

## 2021-08-16 ENCOUNTER — APPOINTMENT (OUTPATIENT)
Dept: CT IMAGING | Age: 71
DRG: 374 | End: 2021-08-16
Payer: MEDICARE

## 2021-08-16 ENCOUNTER — ANESTHESIA (OUTPATIENT)
Dept: ENDOSCOPY | Age: 71
DRG: 374 | End: 2021-08-16
Payer: MEDICARE

## 2021-08-16 VITALS — OXYGEN SATURATION: 94 % | DIASTOLIC BLOOD PRESSURE: 58 MMHG | SYSTOLIC BLOOD PRESSURE: 111 MMHG

## 2021-08-16 LAB
A/G RATIO: 1.7 (ref 1.1–2.2)
ALBUMIN SERPL-MCNC: 3.7 G/DL (ref 3.4–5)
ALP BLD-CCNC: 36 U/L (ref 40–129)
ALT SERPL-CCNC: 16 U/L (ref 10–40)
ANION GAP SERPL CALCULATED.3IONS-SCNC: 10 MMOL/L (ref 3–16)
AST SERPL-CCNC: 17 U/L (ref 15–37)
BILIRUB SERPL-MCNC: 0.6 MG/DL (ref 0–1)
BUN BLDV-MCNC: 11 MG/DL (ref 7–20)
CALCIUM SERPL-MCNC: 8.8 MG/DL (ref 8.3–10.6)
CHLORIDE BLD-SCNC: 109 MMOL/L (ref 99–110)
CO2: 24 MMOL/L (ref 21–32)
CREAT SERPL-MCNC: 1.1 MG/DL (ref 0.8–1.3)
GFR AFRICAN AMERICAN: >60
GFR NON-AFRICAN AMERICAN: >60
GLOBULIN: 2.2 G/DL
GLUCOSE BLD-MCNC: 102 MG/DL (ref 70–99)
GLUCOSE BLD-MCNC: 122 MG/DL (ref 70–99)
GLUCOSE BLD-MCNC: 85 MG/DL (ref 70–99)
GLUCOSE BLD-MCNC: 88 MG/DL (ref 70–99)
GLUCOSE BLD-MCNC: 92 MG/DL (ref 70–99)
GLUCOSE BLD-MCNC: 94 MG/DL (ref 70–99)
GLUCOSE BLD-MCNC: 97 MG/DL (ref 70–99)
GLUCOSE BLD-MCNC: 97 MG/DL (ref 70–99)
HCT VFR BLD CALC: 28.1 % (ref 40.5–52.5)
HCT VFR BLD CALC: 29.7 % (ref 40.5–52.5)
HCT VFR BLD CALC: 29.8 % (ref 40.5–52.5)
HEMOGLOBIN: 10 G/DL (ref 13.5–17.5)
HEMOGLOBIN: 10 G/DL (ref 13.5–17.5)
HEMOGLOBIN: 9.4 G/DL (ref 13.5–17.5)
MAGNESIUM: 1.9 MG/DL (ref 1.8–2.4)
MCH RBC QN AUTO: 26.8 PG (ref 26–34)
MCHC RBC AUTO-ENTMCNC: 33.5 G/DL (ref 31–36)
MCV RBC AUTO: 79.9 FL (ref 80–100)
PDW BLD-RTO: 15 % (ref 12.4–15.4)
PERFORMED ON: ABNORMAL
PERFORMED ON: ABNORMAL
PERFORMED ON: NORMAL
PHOSPHORUS: 3.3 MG/DL (ref 2.5–4.9)
PLATELET # BLD: 162 K/UL (ref 135–450)
PMV BLD AUTO: 8.4 FL (ref 5–10.5)
POTASSIUM SERPL-SCNC: 3.8 MMOL/L (ref 3.5–5.1)
RBC # BLD: 3.51 M/UL (ref 4.2–5.9)
SODIUM BLD-SCNC: 143 MMOL/L (ref 136–145)
TOTAL PROTEIN: 5.9 G/DL (ref 6.4–8.2)
WBC # BLD: 4.9 K/UL (ref 4–11)

## 2021-08-16 PROCEDURE — 3609010600 HC COLONOSCOPY POLYPECTOMY SNARE/COLD BIOPSY: Performed by: INTERNAL MEDICINE

## 2021-08-16 PROCEDURE — 85018 HEMOGLOBIN: CPT

## 2021-08-16 PROCEDURE — 2060000000 HC ICU INTERMEDIATE R&B

## 2021-08-16 PROCEDURE — 2580000003 HC RX 258: Performed by: ANESTHESIOLOGY

## 2021-08-16 PROCEDURE — 6370000000 HC RX 637 (ALT 250 FOR IP): Performed by: INTERNAL MEDICINE

## 2021-08-16 PROCEDURE — 6360000002 HC RX W HCPCS: Performed by: NURSE ANESTHETIST, CERTIFIED REGISTERED

## 2021-08-16 PROCEDURE — C9113 INJ PANTOPRAZOLE SODIUM, VIA: HCPCS | Performed by: INTERNAL MEDICINE

## 2021-08-16 PROCEDURE — 36415 COLL VENOUS BLD VENIPUNCTURE: CPT

## 2021-08-16 PROCEDURE — 85014 HEMATOCRIT: CPT

## 2021-08-16 PROCEDURE — 3700000001 HC ADD 15 MINUTES (ANESTHESIA): Performed by: INTERNAL MEDICINE

## 2021-08-16 PROCEDURE — 3E0H8KZ INTRODUCTION OF OTHER DIAGNOSTIC SUBSTANCE INTO LOWER GI, VIA NATURAL OR ARTIFICIAL OPENING ENDOSCOPIC: ICD-10-PCS | Performed by: INTERNAL MEDICINE

## 2021-08-16 PROCEDURE — 2709999900 HC NON-CHARGEABLE SUPPLY: Performed by: INTERNAL MEDICINE

## 2021-08-16 PROCEDURE — 71250 CT THORAX DX C-: CPT

## 2021-08-16 PROCEDURE — 88305 TISSUE EXAM BY PATHOLOGIST: CPT

## 2021-08-16 PROCEDURE — 3609019800 HC COLONOSCOPY WITH SUBMUCOSAL INJECTION: Performed by: INTERNAL MEDICINE

## 2021-08-16 PROCEDURE — 2580000003 HC RX 258: Performed by: INTERNAL MEDICINE

## 2021-08-16 PROCEDURE — 80053 COMPREHEN METABOLIC PANEL: CPT

## 2021-08-16 PROCEDURE — 0DBM8ZX EXCISION OF DESCENDING COLON, VIA NATURAL OR ARTIFICIAL OPENING ENDOSCOPIC, DIAGNOSTIC: ICD-10-PCS | Performed by: INTERNAL MEDICINE

## 2021-08-16 PROCEDURE — 6360000002 HC RX W HCPCS: Performed by: INTERNAL MEDICINE

## 2021-08-16 PROCEDURE — 2500000003 HC RX 250 WO HCPCS: Performed by: NURSE ANESTHETIST, CERTIFIED REGISTERED

## 2021-08-16 PROCEDURE — 84100 ASSAY OF PHOSPHORUS: CPT

## 2021-08-16 PROCEDURE — 85027 COMPLETE CBC AUTOMATED: CPT

## 2021-08-16 PROCEDURE — 3700000000 HC ANESTHESIA ATTENDED CARE: Performed by: INTERNAL MEDICINE

## 2021-08-16 PROCEDURE — 7100000001 HC PACU RECOVERY - ADDTL 15 MIN: Performed by: INTERNAL MEDICINE

## 2021-08-16 PROCEDURE — 83735 ASSAY OF MAGNESIUM: CPT

## 2021-08-16 PROCEDURE — 3609010300 HC COLONOSCOPY W/BIOPSY SINGLE/MULTIPLE: Performed by: INTERNAL MEDICINE

## 2021-08-16 PROCEDURE — 7100000000 HC PACU RECOVERY - FIRST 15 MIN: Performed by: INTERNAL MEDICINE

## 2021-08-16 RX ORDER — HYDROMORPHONE HCL 110MG/55ML
0.25 PATIENT CONTROLLED ANALGESIA SYRINGE INTRAVENOUS EVERY 5 MIN PRN
Status: DISCONTINUED | OUTPATIENT
Start: 2021-08-16 | End: 2021-08-16 | Stop reason: HOSPADM

## 2021-08-16 RX ORDER — HYDROCODONE BITARTRATE AND ACETAMINOPHEN 5; 325 MG/1; MG/1
1 TABLET ORAL
Status: DISCONTINUED | OUTPATIENT
Start: 2021-08-16 | End: 2021-08-16 | Stop reason: HOSPADM

## 2021-08-16 RX ORDER — SODIUM CHLORIDE 9 MG/ML
INJECTION, SOLUTION INTRAVENOUS CONTINUOUS
Status: DISCONTINUED | OUTPATIENT
Start: 2021-08-16 | End: 2021-08-18 | Stop reason: HOSPADM

## 2021-08-16 RX ORDER — HYDROMORPHONE HCL 110MG/55ML
0.5 PATIENT CONTROLLED ANALGESIA SYRINGE INTRAVENOUS EVERY 5 MIN PRN
Status: DISCONTINUED | OUTPATIENT
Start: 2021-08-16 | End: 2021-08-16 | Stop reason: HOSPADM

## 2021-08-16 RX ORDER — FENTANYL CITRATE 50 UG/ML
25 INJECTION, SOLUTION INTRAMUSCULAR; INTRAVENOUS EVERY 5 MIN PRN
Status: DISCONTINUED | OUTPATIENT
Start: 2021-08-16 | End: 2021-08-16 | Stop reason: HOSPADM

## 2021-08-16 RX ORDER — LIDOCAINE HYDROCHLORIDE 20 MG/ML
INJECTION, SOLUTION INFILTRATION; PERINEURAL PRN
Status: DISCONTINUED | OUTPATIENT
Start: 2021-08-16 | End: 2021-08-16 | Stop reason: SDUPTHER

## 2021-08-16 RX ORDER — INSULIN LISPRO 100 [IU]/ML
0-6 INJECTION, SOLUTION INTRAVENOUS; SUBCUTANEOUS
Status: DISCONTINUED | OUTPATIENT
Start: 2021-08-17 | End: 2021-08-18 | Stop reason: HOSPADM

## 2021-08-16 RX ORDER — ONDANSETRON 2 MG/ML
4 INJECTION INTRAMUSCULAR; INTRAVENOUS
Status: DISCONTINUED | OUTPATIENT
Start: 2021-08-16 | End: 2021-08-16 | Stop reason: HOSPADM

## 2021-08-16 RX ORDER — PROMETHAZINE HYDROCHLORIDE 25 MG/ML
6.25 INJECTION, SOLUTION INTRAMUSCULAR; INTRAVENOUS
Status: DISCONTINUED | OUTPATIENT
Start: 2021-08-16 | End: 2021-08-16 | Stop reason: HOSPADM

## 2021-08-16 RX ORDER — PROPOFOL 10 MG/ML
INJECTION, EMULSION INTRAVENOUS PRN
Status: DISCONTINUED | OUTPATIENT
Start: 2021-08-16 | End: 2021-08-16 | Stop reason: SDUPTHER

## 2021-08-16 RX ORDER — FENTANYL CITRATE 50 UG/ML
50 INJECTION, SOLUTION INTRAMUSCULAR; INTRAVENOUS EVERY 5 MIN PRN
Status: DISCONTINUED | OUTPATIENT
Start: 2021-08-16 | End: 2021-08-16 | Stop reason: HOSPADM

## 2021-08-16 RX ORDER — LIDOCAINE HYDROCHLORIDE 10 MG/ML
1 INJECTION, SOLUTION EPIDURAL; INFILTRATION; INTRACAUDAL; PERINEURAL
Status: DISCONTINUED | OUTPATIENT
Start: 2021-08-16 | End: 2021-08-16 | Stop reason: HOSPADM

## 2021-08-16 RX ADMIN — PROPOFOL 30 MG: 10 INJECTION, EMULSION INTRAVENOUS at 10:45

## 2021-08-16 RX ADMIN — ATORVASTATIN CALCIUM 20 MG: 20 TABLET, FILM COATED ORAL at 22:23

## 2021-08-16 RX ADMIN — Medication 10 ML: at 22:24

## 2021-08-16 RX ADMIN — Medication 10 ML: at 08:25

## 2021-08-16 RX ADMIN — VALSARTAN 320 MG: 160 TABLET, FILM COATED ORAL at 11:57

## 2021-08-16 RX ADMIN — CLONIDINE HYDROCHLORIDE 0.1 MG: 0.1 TABLET ORAL at 11:56

## 2021-08-16 RX ADMIN — LIDOCAINE HYDROCHLORIDE 100 MG: 20 INJECTION, SOLUTION INFILTRATION; PERINEURAL at 10:26

## 2021-08-16 RX ADMIN — AMLODIPINE BESYLATE 10 MG: 5 TABLET ORAL at 11:57

## 2021-08-16 RX ADMIN — CLONIDINE HYDROCHLORIDE 0.1 MG: 0.1 TABLET ORAL at 22:23

## 2021-08-16 RX ADMIN — HYDROCHLOROTHIAZIDE 12.5 MG: 25 TABLET ORAL at 11:56

## 2021-08-16 RX ADMIN — PROPOFOL 80 MG: 10 INJECTION, EMULSION INTRAVENOUS at 10:26

## 2021-08-16 RX ADMIN — Medication 1 TABLET: at 11:57

## 2021-08-16 RX ADMIN — SODIUM CHLORIDE: 9 INJECTION, SOLUTION INTRAVENOUS at 09:59

## 2021-08-16 RX ADMIN — PANTOPRAZOLE SODIUM 40 MG: 40 INJECTION, POWDER, FOR SOLUTION INTRAVENOUS at 08:25

## 2021-08-16 RX ADMIN — LATANOPROST 1 DROP: 50 SOLUTION OPHTHALMIC at 22:24

## 2021-08-16 RX ADMIN — CARVEDILOL 25 MG: 25 TABLET, FILM COATED ORAL at 08:26

## 2021-08-16 RX ADMIN — CARVEDILOL 25 MG: 25 TABLET, FILM COATED ORAL at 17:07

## 2021-08-16 RX ADMIN — PROPOFOL 40 MG: 10 INJECTION, EMULSION INTRAVENOUS at 10:38

## 2021-08-16 RX ADMIN — PROPOFOL 50 MG: 10 INJECTION, EMULSION INTRAVENOUS at 10:31

## 2021-08-16 RX ADMIN — DOXAZOSIN 8 MG: 4 TABLET ORAL at 22:24

## 2021-08-16 RX ADMIN — PROPOFOL 30 MG: 10 INJECTION, EMULSION INTRAVENOUS at 10:28

## 2021-08-16 ASSESSMENT — PAIN SCALES - GENERAL
PAINLEVEL_OUTOF10: 0

## 2021-08-16 NOTE — PROGRESS NOTES
Returned form colonoscopy, bedside report from [place]. Pt ao x4, indep amb to bathroom and ret to recliner. No s/s of distress noted at this time. Call light within reach.

## 2021-08-16 NOTE — ANESTHESIA PRE PROCEDURE
Department of Anesthesiology  Preprocedure Note       Name:  Zully Russell   Age:  70 y.o.  :  1950                                          MRN:  [id number]         Date:  2021      Surgeon: Clarke Conteh):  Tad Maldonado MD    Procedure: Procedure(s):  COLONOSCOPY DIAGNOSTIC    Medications prior to admission:   Prior to Admission medications    Medication Sig Start Date End Date Taking? Authorizing Provider   atorvastatin (LIPITOR) 20 MG tablet TAKE 1 TABLET BY MOUTH EVERY DAY 21  Yes Rafita Saenz MD   cloNIDine (CATAPRES) 0.1 MG tablet TAKE 1 TABLET BY MOUTH TWICE A DAY 5/10/21  Yes Rafita Saenz MD   aspirin 81 MG tablet Take 81 mg by mouth daily. Yes Historical Provider, MD   carvedilol (COREG) 25 MG tablet TAKE 1 TABLET BY MOUTH TWICE A DAY 21   Rafita Saenz MD   amLODIPine (NORVASC) 10 MG tablet TAKE 1 TABLET BY MOUTH EVERY DAY 21   Rafita Saenz MD   doxazosin (CARDURA) 8 MG tablet TAKE 1 TABLET BY MOUTH EVERY NIGHT 7/15/21   Rafita Saenz MD   valsartan-hydrochlorothiazide (DIOVAN-HCT) 320-12.5 MG per tablet TAKE 1 TABLET BY MOUTH DAILY 21   Rafita Saenz MD   sildenafil (VIAGRA) 50 MG tablet Take 1 tablet by mouth daily as needed for Erectile Dysfunction  Patient not taking: Reported on 2021   Marychuy Ron MD   [address] Test blood sugar twice daily. 14   Historical Provider, MD   travoprost, benzalkonium, (TRAVATAN) 0.004 % ophthalmic solution Place 1 drop into both eyes nightly. Historical Provider, MD   glucose blood VI test strips (ASCENSIA AUTODISC VI;ONE TOUCH ULTRA TEST VI) strip 1 each by In Vitro route 2 times daily. As needed. 14   Jessica Harris DO   Blood Glucose Monitoring Suppl (BLOOD GLUCOSE METER) KIT 1 Device by Does not apply route 2 times daily. 14   Jessica Harris DO   therapeutic multivitamin-minerals [facility]) tablet Take 1 tablet by mouth daily.     Historical Provider, MD       Current medications:    Current Facility-Administered Medications   Medication Dose Route Frequency Provider Last Rate Last Admin    HYDROmorphone (DILAUDID) injection 0.25 mg  0.25 mg Intravenous Q5 Min RODRÍGUEZ Johnson MD        HYDROmorphone (DILAUDID) injection 0.5 mg  0.5 mg Intravenous Q5 Min RODRÍGUEZ Johnson MD        fentaNYL (SUBLIMAZE) injection 25 mcg  25 mcg Intravenous Q5 Min RODRÍGUEZ Johnson MD        fentaNYL (SUBLIMAZE) injection 50 mcg  50 mcg Intravenous Q5 Min PRADAMARIS Johnson MD        HYDROcodone-acetaminophen (NORCO) 5-325 MG per tablet 1 tablet  1 tablet Oral Once PRN Andrea Johnson MD        [facility]ansetron [facility]) injection 4 mg  4 mg Intravenous Once PRADAMARIS Johsnon MD        promethazine (PHENERGAN) injection 6.25 mg  6.25 mg Intravenous Once PRN Anrdea Johnson MD        amLODIPine (NORVASC) tablet 10 mg  10 mg Oral Daily Gina Arizmendi MD        atorvastatin (LIPITOR) tablet 20 mg  20 mg Oral Nightly Gina Arizmendi MD   20 mg at 08/15/21 2040    carvedilol (COREG) tablet 25 mg  25 mg Oral BID WC Gina Arizmendi MD   25 mg at 08/16/21 1122    cloNIDine (CATAPRES) tablet 0.1 mg  0.1 mg Oral BID Gina Arizmendi MD   0.1 mg at 08/15/21 2040    doxazosin (CARDURA) tablet 8 mg  8 mg Oral Nightly Gina Arizmendi MD   8 mg at 08/15/21 2040    therapeutic multivitamin-minerals 1 tablet  1 tablet Oral Daily Gina Arizmendi MD        potassium chloride (KLOR-CON M) extended release tablet 40 mEq  40 mEq Oral PRN Gina Arizmendi MD        Or    potassium bicarb-citric acid (EFFER-K) effervescent tablet 40 mEq  40 mEq Oral PRN Gina Arizmendi MD        Or    potassium chloride 10 mEq/100 mL IVPB (Peripheral Line)  10 mEq Intravenous PRN Gina Arizmendi MD        sodium chloride flush 0.9 % injection 5-40 mL  5-40 mL Intravenous 2 times per day Gina Arizmendi MD   10 mL at 08/16/21 0825    sodium chloride flush 0.9 % injection 5-40 mL  5-40 mL Intravenous PRN Everton Singletary MD        0.9 % sodium chloride infusion  25 mL Intravenous KEVINN Everton Singletary MD        ondansetron (ZOFRAN-ODT) disintegrating tablet 4 mg  4 mg Oral Q8H PRN Everton Singletary MD        Or    ondansetron (ZOFRAN) injection 4 mg  4 mg Intravenous Q6H PRN Everton Singletary MD        acetaminophen (TYLENOL) tablet 650 mg  650 mg Oral Q6H PRN Everton Singletary MD        Or    acetaminophen (TYLENOL) suppository 650 mg  650 mg Rectal Q6H PRN Everton Singletary MD        pantoprazole (PROTONIX) injection 40 mg  40 mg Intravenous Daily Everton Singletary MD   40 mg at 08/16/21 0825    insulin lispro (1 Unit Dial) 0-6 Units  0-6 Units Subcutaneous Q4H Everton Singletary MD   1 Units at 08/15/21 1903    glucose (GLUTOSE) 40 % oral gel 15 g  15 g Oral PRN Everton Singletary MD        dextrose 50 % IV solution  12.5 g Intravenous PRN Everton Singletary MD        glucagon (rDNA) injection 1 mg  1 mg Intramuscular PRN Everton Singletary MD        dextrose 5 % solution  100 mL/hr Intravenous KEVINN Everton Singletary MD        latanoprost (XALATAN) 0.005 % ophthalmic solution 1 drop  1 drop Both Eyes Nightly Everton Singletary MD   1 drop at 08/15/21 2040    valsartan (DIOVAN) tablet 320 mg  320 mg Oral Daily Everton Singletary MD        And    hydroCHLOROthiazide (HYDRODIURIL) tablet 12.5 mg  12.5 mg Oral Daily Everton Singletary MD           Allergies:  No Known Allergies    Problem List:    Patient Active Problem List   Diagnosis Code    Coronary artery disease involving native coronary artery of native heart without angina pectoris I25.10    HTN (hypertension), benign I10    Hyperlipidemia with target LDL less than 70 E78.5    ED (erectile dysfunction) N52.9    DM type 2 (diabetes mellitus, type 2) ([facility] E11.9    Vitamin D deficiency E55.9    [facility] E72.11    PSA elevation R97.20    Neurofibromatosis ([facility] Q85.00    Low testosterone R79.89    Sensorineural hearing loss, bilateral H90.3    Diabetes mellitus with microalbuminuric diabetic nephropathy ([facility]) E11.21    Adenomatous polyp of sigmoid colon D12.5    Diverticulosis of large intestine without hemorrhage K57.30    Morbidly obese (HCC) E66.01    Ptosis of both eyelids H02.403    Acute lower GI hemorrhage K92.2       Past Medical History:        Diagnosis Date    CAD (coronary artery disease) 7/9/2004    Coronary artery disease involving native coronary artery of native heart without angina pectoris     2004 cath > Taxus stents distal/prox RCA 2006 Myoview> EF 69%, small inferior fixed defect distal RCA     Diabetes mellitus with microalbuminuric diabetic nephropathy ([address] 05/01/2015    157    Diverticulosis of colon 08/31/2018    moderate diffuse    DM type 2 (diabetes mellitus, type 2) ([address] 7/7/2014    ED (erectile dysfunction)     Epistaxis 01/01/2012    cautry    Homocysteinemia ([address] 09/24/2014    13    HTN (hypertension), benign 01/01/2001    moderate concentric left ventricular hypertrophy 2/16/17    Hyperlipidemia LDL goal < 70 7/12/2004    Low testosterone 05/17/2013    204    MI (myocardial infarction) ([address] 07/09/2004    with 2 stents RCA prox and distal    Neurofibromatosis ([address]     right leg lateral neurofibroma    ANTONIETTA (obstructive sleep apnea) 06/19/2013    CPAP      PSA elevation 1/1/2000    No Bx    Sensorineural hearing loss, bilateral 7/3/2013    right > left    Vitamin D deficiency 01/27/2014    26       Past Surgical History:        Procedure Laterality Date    CARDIAC SURGERY  2004    STENTS    COLONOSCOPY  01/01/2006    NO POLYPS    COLONOSCOPY N/A 08/31/2018    Tubular adenoma. Every 5 year colonoscopy. Rectal bleeding for 4 days postop.     CORONARY ANGIOPLASTY WITH STENT PLACEMENT  7/12/2004    2 stents in RCA    LEG SURGERY Left ~2001    [facility]  Lat calf & lat     NASAL HEMORRHAGE CONTROL  1/1/2012       Social History: Social History     Tobacco Use    Smoking status: Former Smoker     Packs/day: 0.00     Years: 30.00     Pack years: 0.00     Types: Pipe     Quit date: 1976     Years since quittin.6    Smokeless tobacco: Never Used    Tobacco comment: 30-33 year 2 bowls /day   Substance Use Topics    Alcohol use: Yes     Alcohol/week: 0.0 standard drinks     Comment: Wine occasionally ie wedding/business event. Was drinking mid 34's 9-8 scotch 1 yr. Counseling given: Not Answered  Comment: 30-33 year 2 bowls /day      Vital Signs (Current):   Vitals:    21 0015 21 0445 21 0724 21 0825   BP: (!) 147/75 (!) 153/76  (!) 165/76   Pulse: 76 72  81   Resp:    Temp: 97.5 °F (36.4 °C) 98.6 °F (37 °C)  98.4 °F (36.9 °C)   TempSrc: Oral Oral  Oral   SpO2: 96% 99%  98%   Weight:   286 lb 14.4 oz (130.1 kg)    Height:                                                  BP Readings from Last 3 Encounters:   21 (!) 165/76   21 130/80   21 136/84       NPO Status:                                                                                 BMI:   Wt Readings from Last 3 Encounters:   21 286 lb 14.4 oz (130.1 kg)   21 298 lb 4.8 oz (135.3 kg)   21 293 lb (132.9 kg)     Body mass index is 37.85 kg/m².     CBC:   Lab Results   Component Value Date    WBC 4.9 2021    RBC 3.51 2021    HGB 9.4 2021    HCT 28.1 2021    MCV 79.9 2021    RDW 15.0 2021     2021       CMP:   Lab Results   Component Value Date     2021    K 3.8 2021    K 3.9 2021     2021    CO2 24 2021    BUN 11 2021    CREATININE 1.1 2021    GFRAA >60 2021    GFRAA >60 2012    AGRATIO 1.7 2021    LABGLOM >60 2021    GLUCOSE 92 2021    PROT 5.9 2021    CALCIUM 8.8 2021    BILITOT 0.6 2021    ALKPHOS 36 2021    AST 17 Hypertension.     Patient Status: Routine     Height: 73 inches Weight: 270.01 pounds BSA: 2.44 m2 BMI: 35.62 kg/m2     Rhythm: Within normal limits BP: 187/114 mmHg      Conclusions      Summary   Normal left ventricle size and systolic function with an estimated ejection   fraction of 60%. No regional wall motion abnormalities are seen. There is moderate concentric left ventricular hypertrophy. E/e\"= 14.   Mild mitral regurgitation. Aortic valve appears sclerotic but opens adequately. No stenosis. Mild aortic regurgitation is present. Pressure half-time is calculated at 613 msec. Mild tricuspid regurgitation with RVSP estimated at 28 mmHg. Signature     Neuro/Psych:      (-) seizures, TIA and CVA           GI/Hepatic/Renal:        (-) GERD      ROS comment: GI bleed  anemia. Endo/Other:    (+) Diabetes, . Abdominal:             Vascular: Other Findings:           Anesthesia Plan      MAC     ASA 3       Induction: intravenous. Anesthetic plan and risks discussed with patient. Use of blood products discussed with patient whom consented to blood products. Plan discussed with CRNA.                   Shabbir Bardales MD   8/16/2021

## 2021-08-16 NOTE — PROGRESS NOTES
(36.7 °C) (Oral)   Resp 18   Ht 6' 1\" (1.854 m)   Wt 286 lb 14.4 oz (130.1 kg)   SpO2 96%   BMI 37.85 kg/m²     Intake/Output Summary (Last 24 hours) at 8/16/2021 1222  Last data filed at 8/16/2021 1204  Gross per 24 hour   Intake 4532.51 ml   Output 300 ml   Net 4232.51 ml      Wt Readings from Last 3 Encounters:   08/16/21 286 lb 14.4 oz (130.1 kg)   05/28/21 298 lb 4.8 oz (135.3 kg)   05/17/21 293 lb (132.9 kg)       General appearance:  Appears comfortable  Eyes: Sclera clear. Pupils equal.  ENT: Moist oral mucosa. Trachea midline, no adenopathy. Cardiovascular: Regular rhythm, normal S1, S2. No murmur. No edema in lower extremities  Respiratory: Not using accessory muscles. Good inspiratory effort. Clear to auscultation bilaterally, no wheeze or crackles. GI: Abdomen soft, no tenderness, not distended, normal bowel sounds  Musculoskeletal: No cyanosis in digits, neck supple  Neurology: CN 2-12 grossly intact. No speech or motor deficits  Psych: Normal affect. Alert and oriented in time, place and person  Skin: Warm, dry, normal turgor    Labs and Tests:  CBC:   Recent Labs     08/14/21  1826 08/15/21  0454 08/15/21  2303 08/16/21  0442 08/16/21  1137   WBC 7.0  --   --  4.9  --    HGB 11.6*   < > 9.5* 9.4* 10.0*     --   --  162  --     < > = values in this interval not displayed. BMP:    Recent Labs     08/14/21  1826 08/15/21  0454 08/16/21  0442    140 143   K 3.9 3.8 3.8    107 109   CO2 23 24 24   BUN 18 15 11   CREATININE 1.1 1.2 1.1   GLUCOSE 190* 122* 92     Hepatic:   Recent Labs     08/14/21  1826 08/15/21  0454 08/16/21  0442   AST 13* 10* 17   ALT 12 10 16   BILITOT 0.7 0.6 0.6   ALKPHOS 44 37* 36*     CT abd/pelvis  1. No active contrast extravasation. 2. Diverticulosis without scan evidence for diverticulitis.             Problem List  Active Problems:    Acute lower GI hemorrhage  Resolved Problems:    * No resolved hospital problems.  *       Assessment & Plan: 1. Lower GI bleed secondary to probable colon CA-hemoglobin down nearly 4 grams since ED but stable at this time. Repeat H and H tomorrow. 2. Acute blood less anemia-hemoglobin down almost 4 grams. Continue to monitor. 3. Suspected colon cancer-CEA pending. Check CT chest. Path pending. Will follow up with Dr Ramon Waller. 4. Hypertension-ok to give all antihypertensives.        Diet: Diet NPO Exceptions are: Ice Chips, Sips of Water with Meds  Code:Full Code  DVT PPXscd      Claudean Bogus, PA-C   8/16/2021 12:22 PM

## 2021-08-16 NOTE — PLAN OF CARE
Problem: SAFETY  Goal: Free from accidental physical injury  8/15/2021 2334 by Juliette Akers RN  Note: Patient free from harm. ID bands on, bed in lowest position, call light in reach. Patient instructed to call for help if needed. Patient educated on ambulation and safety. 8/15/2021 2035 by Gertrudis Moser RN  Outcome: Ongoing     Problem: PAIN  Goal: Patient's pain/discomfort is manageable  Note: Pt denies pain at this time.

## 2021-08-16 NOTE — PROGRESS NOTES
VSS, assessment complete. Endo called, pt will be going to procedure soon. No s/s of distress noted at this time. Call light within reach.

## 2021-08-16 NOTE — PROGRESS NOTES
Pt arrived from endo, report from crna/rn. Pt had colonoscopy with 1 polyp removed, rectal mass biopsy. Pt left side lying, responsive to voice upon arrival. Respirations even unlabored, nasal cannula 3 liters in place. Abdomen soft non distended.

## 2021-08-16 NOTE — PROGRESS NOTES
Pt transferred back to room 369 in stable condition. Bedside report to Giovana ESPINOZA. Pt able to ambulate to bathroom from stretcher with steady gait.

## 2021-08-16 NOTE — ANESTHESIA POSTPROCEDURE EVALUATION
Department of Anesthesiology  Postprocedure Note    Patient: Anselmo Ventura  MRN: [id number]  YOB: 1950  Date of evaluation: 8/16/2021  Time:  11:19 AM     Procedure Summary     Date: 08/16/21 Room / Location: [address] [place] [address]    Anesthesia Start: 1221 Anesthesia Stop: 1053    Procedures:       COLONOSCOPY POLYPECTOMY SNARE/COLD BIOPSY (N/A )      COLONOSCOPY WITH BIOPSY      COLONOSCOPY SUBMUCOSAL SPOT INJECTION Diagnosis: (hematochezia)    Surgeons: Gabriela Perez MD Responsible Provider: Rachel Leigh MD    Anesthesia Type: MAC ASA Status: 3          Anesthesia Type: MAC    Chad Phase I: Chad Score: 10    Chad Phase II:      Last vitals: Reviewed and per EMR flowsheets.        Anesthesia Post Evaluation    Patient location during evaluation: PACU  Patient participation: complete - patient participated  Level of consciousness: awake  Airway patency: patent  Nausea & Vomiting: no vomiting  Complications: no  Cardiovascular status: hemodynamically stable  Respiratory status: acceptable  Hydration status: euvolemic

## 2021-08-17 LAB
A/G RATIO: 1.6 (ref 1.1–2.2)
ALBUMIN SERPL-MCNC: 3.6 G/DL (ref 3.4–5)
ALP BLD-CCNC: 37 U/L (ref 40–129)
ALT SERPL-CCNC: 13 U/L (ref 10–40)
ANION GAP SERPL CALCULATED.3IONS-SCNC: 9 MMOL/L (ref 3–16)
AST SERPL-CCNC: 15 U/L (ref 15–37)
BILIRUB SERPL-MCNC: 0.5 MG/DL (ref 0–1)
BUN BLDV-MCNC: 9 MG/DL (ref 7–20)
CALCIUM SERPL-MCNC: 8.9 MG/DL (ref 8.3–10.6)
CHLORIDE BLD-SCNC: 107 MMOL/L (ref 99–110)
CO2: 24 MMOL/L (ref 21–32)
CREAT SERPL-MCNC: 1.1 MG/DL (ref 0.8–1.3)
GFR AFRICAN AMERICAN: >60
GFR NON-AFRICAN AMERICAN: >60
GLOBULIN: 2.2 G/DL
GLUCOSE BLD-MCNC: 102 MG/DL (ref 70–99)
GLUCOSE BLD-MCNC: 104 MG/DL (ref 70–99)
GLUCOSE BLD-MCNC: 112 MG/DL (ref 70–99)
GLUCOSE BLD-MCNC: 129 MG/DL (ref 70–99)
GLUCOSE BLD-MCNC: 195 MG/DL (ref 70–99)
HCT VFR BLD CALC: 26.8 % (ref 40.5–52.5)
HEMOGLOBIN: 9 G/DL (ref 13.5–17.5)
MAGNESIUM: 2 MG/DL (ref 1.8–2.4)
MCH RBC QN AUTO: 26.9 PG (ref 26–34)
MCHC RBC AUTO-ENTMCNC: 33.7 G/DL (ref 31–36)
MCV RBC AUTO: 79.7 FL (ref 80–100)
PDW BLD-RTO: 15 % (ref 12.4–15.4)
PERFORMED ON: ABNORMAL
PHOSPHORUS: 3.5 MG/DL (ref 2.5–4.9)
PLATELET # BLD: 175 K/UL (ref 135–450)
PMV BLD AUTO: 8.6 FL (ref 5–10.5)
POTASSIUM SERPL-SCNC: 3.6 MMOL/L (ref 3.5–5.1)
RBC # BLD: 3.36 M/UL (ref 4.2–5.9)
SODIUM BLD-SCNC: 140 MMOL/L (ref 136–145)
TOTAL PROTEIN: 5.8 G/DL (ref 6.4–8.2)
WBC # BLD: 6.3 K/UL (ref 4–11)

## 2021-08-17 PROCEDURE — 6370000000 HC RX 637 (ALT 250 FOR IP): Performed by: INTERNAL MEDICINE

## 2021-08-17 PROCEDURE — 2580000003 HC RX 258: Performed by: INTERNAL MEDICINE

## 2021-08-17 PROCEDURE — 84100 ASSAY OF PHOSPHORUS: CPT

## 2021-08-17 PROCEDURE — 6370000000 HC RX 637 (ALT 250 FOR IP): Performed by: NURSE PRACTITIONER

## 2021-08-17 PROCEDURE — 6360000002 HC RX W HCPCS: Performed by: INTERNAL MEDICINE

## 2021-08-17 PROCEDURE — 2060000000 HC ICU INTERMEDIATE R&B

## 2021-08-17 PROCEDURE — 83735 ASSAY OF MAGNESIUM: CPT

## 2021-08-17 PROCEDURE — 36415 COLL VENOUS BLD VENIPUNCTURE: CPT

## 2021-08-17 PROCEDURE — 85027 COMPLETE CBC AUTOMATED: CPT

## 2021-08-17 PROCEDURE — C9113 INJ PANTOPRAZOLE SODIUM, VIA: HCPCS | Performed by: INTERNAL MEDICINE

## 2021-08-17 PROCEDURE — 80053 COMPREHEN METABOLIC PANEL: CPT

## 2021-08-17 RX ORDER — HYDRALAZINE HYDROCHLORIDE 25 MG/1
25 TABLET, FILM COATED ORAL EVERY 8 HOURS SCHEDULED
Status: DISCONTINUED | OUTPATIENT
Start: 2021-08-17 | End: 2021-08-18 | Stop reason: HOSPADM

## 2021-08-17 RX ADMIN — AMLODIPINE BESYLATE 10 MG: 5 TABLET ORAL at 09:26

## 2021-08-17 RX ADMIN — CLONIDINE HYDROCHLORIDE 0.1 MG: 0.1 TABLET ORAL at 09:27

## 2021-08-17 RX ADMIN — VALSARTAN 320 MG: 160 TABLET, FILM COATED ORAL at 09:26

## 2021-08-17 RX ADMIN — ATORVASTATIN CALCIUM 20 MG: 20 TABLET, FILM COATED ORAL at 20:27

## 2021-08-17 RX ADMIN — HYDRALAZINE HYDROCHLORIDE 25 MG: 25 TABLET, FILM COATED ORAL at 21:44

## 2021-08-17 RX ADMIN — HYDROCHLOROTHIAZIDE 12.5 MG: 25 TABLET ORAL at 09:25

## 2021-08-17 RX ADMIN — Medication 10 ML: at 20:37

## 2021-08-17 RX ADMIN — PANTOPRAZOLE SODIUM 40 MG: 40 INJECTION, POWDER, FOR SOLUTION INTRAVENOUS at 09:30

## 2021-08-17 RX ADMIN — LATANOPROST 1 DROP: 50 SOLUTION OPHTHALMIC at 20:30

## 2021-08-17 RX ADMIN — CARVEDILOL 25 MG: 25 TABLET, FILM COATED ORAL at 09:26

## 2021-08-17 RX ADMIN — CLONIDINE HYDROCHLORIDE 0.1 MG: 0.1 TABLET ORAL at 20:27

## 2021-08-17 RX ADMIN — DOXAZOSIN 8 MG: 4 TABLET ORAL at 20:27

## 2021-08-17 RX ADMIN — Medication 1 TABLET: at 09:27

## 2021-08-17 RX ADMIN — CARVEDILOL 25 MG: 25 TABLET, FILM COATED ORAL at 18:27

## 2021-08-17 RX ADMIN — HYDRALAZINE HYDROCHLORIDE 25 MG: 25 TABLET, FILM COATED ORAL at 13:50

## 2021-08-17 RX ADMIN — Medication 10 ML: at 09:30

## 2021-08-17 ASSESSMENT — PAIN SCALES - GENERAL
PAINLEVEL_OUTOF10: 0

## 2021-08-17 NOTE — PLAN OF CARE
Problem: PAIN  Goal: Patient's pain/discomfort is manageable  8/17/2021 1117 by Griselda Sams RN  Outcome: Ongoing  Note: Pt denies pain at this time. No acute distress noted. Will continue to assess.    8/17/2021 0216 by Mendoza Spain RN  Outcome: Ongoing

## 2021-08-17 NOTE — PROGRESS NOTES
Gastroenterology Progress Note    Ruma Hudson is a 70 y.o. male patient. Active Problems:    Acute lower GI hemorrhage  Resolved Problems:    * No resolved hospital problems. *      SUBJECTIVE:  No further bleeding. No abdominal pain. ROS:  No fever, chills  No chest pain, palpitations  No SOB, cough  Gastrointestinal ROS: see above    Physical    VITALS:  BP (!) 176/66   Pulse 84   Temp 98.2 °F (36.8 °C) (Oral)   Resp 16   Ht 6' 1\" (1.854 m)   Wt 282 lb 9.6 oz (128.2 kg)   SpO2 98%   BMI 37.28 kg/m²   TEMPERATURE:  Current - Temp: 98.2 °F (36.8 °C); Max - Temp  Av.3 °F (36.8 °C)  Min: 98 °F (36.7 °C)  Max: 98.8 °F (37.1 °C)    NAD  Abdomen soft, ND, NT,  Bowel sounds normal.  A&Ox3  anicteric    Data    Data Review:    Recent Labs     21  1826 08/15/21  0454 21  0442 21  0442 21  1137 21  1659 21  0456   WBC 7.0  --  4.9  --   --   --  6.3   HGB 11.6*   < > 9.4*   < > 10.0* 10.0* 9.0*   HCT 34.9*  35.4*   < > 28.1*   < > 29.7* 29.8* 26.8*   MCV 80.6  --  79.9*  --   --   --  79.7*     --  162  --   --   --  175    < > = values in this interval not displayed. Recent Labs     08/15/21  0454 21  0456    143 140   K 3.8 3.8 3.6    109 107   CO2 24 24 24   PHOS 3.5 3.3 3.5   BUN 15 11 9   CREATININE 1.2 1.1 1.1     Recent Labs     08/15/21  0454 212 21  0456   AST 10* 17 15   ALT 10 16 13   BILITOT 0.6 0.6 0.5   ALKPHOS 37* 36* 37*     No results for input(s): LIPASE, AMYLASE in the last 72 hours. Recent Labs     21  1826   PROTIME 13.5*   INR 1.19*     No results for input(s): PTT in the last 72 hours. ASSESSMENT :    Hematochezia, ulcerated colon mass - 18 mm ulcerated colon mass in descending colon near splenic flexure, tattooed. Path c/w adenocarcinoma. CT chest, abd,pelvis without any metastatic disease. Acute blood loss anemia - due to the above. No further bleeding.

## 2021-08-17 NOTE — PLAN OF CARE
Problem: SAFETY  Goal: Free from accidental physical injury  Outcome: Ongoing     Problem: DAILY CARE  Goal: Daily care needs are met  Outcome: Ongoing     Problem: PAIN  Goal: Patient's pain/discomfort is manageable  Outcome: Ongoing     Problem: SKIN INTEGRITY  Goal: Skin integrity is maintained or improved  Outcome: Ongoing

## 2021-08-17 NOTE — PROGRESS NOTES
[facility] PROGRESS NOTE    8/17/2021 8:04 AM        Name: Luis Manuel Ramos . Admitted: 8/14/2021  Primary Care Provider: Paul Cotton MD (Tel: [phone])      Subjective:  . Admitted with [facility],  Had continuously dropped his hgb. Now at 9   C scope completed on 8/16. Probable CA     Pt is aware. We had detailed discussion and reviewed labs etc in detail  No recent blood per rectum. No current pain     BP has been elevated.   Follows with cardiology, Dr Jonnie Galvez     Reviewed interval ancillary notes    Current Medications  0.9 % sodium chloride infusion, Continuous  insulin lispro (1 Unit Dial) 0-6 Units, TID WC  amLODIPine (NORVASC) tablet 10 mg, Daily  atorvastatin (LIPITOR) tablet 20 mg, Nightly  carvedilol (COREG) tablet 25 mg, BID WC  cloNIDine (CATAPRES) tablet 0.1 mg, BID  doxazosin (CARDURA) tablet 8 mg, Nightly  therapeutic multivitamin-minerals 1 tablet, Daily  potassium chloride (KLOR-CON M) extended release tablet 40 mEq, PRN   Or  potassium bicarb-citric acid (EFFER-K) effervescent tablet 40 mEq, PRN   Or  potassium chloride 10 mEq/100 mL IVPB (Peripheral Line), PRN  sodium chloride flush 0.9 % injection 5-40 mL, 2 times per day  sodium chloride flush 0.9 % injection 5-40 mL, PRN  0.9 % sodium chloride infusion, PRN  ondansetron (ZOFRAN-ODT) disintegrating tablet 4 mg, Q8H PRN   Or  ondansetron (ZOFRAN) injection 4 mg, Q6H PRN  acetaminophen (TYLENOL) tablet 650 mg, Q6H PRN   Or  acetaminophen (TYLENOL) suppository 650 mg, Q6H PRN  pantoprazole (PROTONIX) injection 40 mg, Daily  glucose (GLUTOSE) 40 % oral gel 15 g, PRN  dextrose 50 % IV solution, PRN  glucagon (rDNA) injection 1 mg, PRN  dextrose 5 % solution, PRN  latanoprost (XALATAN) 0.005 % ophthalmic solution 1 drop, Nightly  valsartan (DIOVAN) tablet 320 mg, Daily   And  hydroCHLOROthiazide (HYDRODIURIL) tablet 12.5 mg, Daily        Objective:  BP (!) 148/69   Pulse 78   Temp 98.4 °F (36.9 °C) (Oral)   Resp 16   Ht 6' 1\" (1.854 m)   Wt 282 lb 9.6 oz (128.2 kg)   SpO2 99%   BMI 37.28 kg/m²     Intake/Output Summary (Last 24 hours) at 8/17/2021 0804  Last data filed at 8/16/2021 1204  Gross per 24 hour   Intake 440 ml   Output --   Net 440 ml      Wt Readings from Last 3 Encounters:   08/17/21 282 lb 9.6 oz (128.2 kg)   05/28/21 298 lb 4.8 oz (135.3 kg)   05/17/21 293 lb (132.9 kg)       General appearance:  Appears comfortable, alert and pleasant   Eyes: Sclera clear. Pupils equal.  ENT: Moist oral mucosa. Trachea midline, no adenopathy. Cardiovascular: Regular rhythm, normal S1, S2. No murmur. No edema in lower extremities  Respiratory: Not using accessory muscles. Good inspiratory effort. Clear to auscultation bilaterally, no wheeze or crackles. GI: Abdomen soft, no tenderness, not distended, normal bowel sounds  Musculoskeletal: No cyanosis in digits, neck supple  Neurology: CN 2-12 grossly intact. No speech or motor deficits  Psych: Normal affect. Alert and oriented in time, place and person  Skin: Warm, dry, normal turgor    Labs and Tests:  CBC:   Recent Labs     08/14/21  1826 08/15/21  0454 08/16/21  0442 08/16/21  0442 08/16/21  1137 08/16/21  1659 08/17/21  0456   WBC 7.0  --  4.9  --   --   --  6.3   HGB 11.6*   < > 9.4*   < > 10.0* 10.0* 9.0*     --  162  --   --   --  175    < > = values in this interval not displayed. BMP:    Recent Labs     08/15/21  0454 08/16/21  0442 08/17/21  0456    143 140   K 3.8 3.8 3.6    109 107   CO2 24 24 24   BUN 15 11 9   CREATININE 1.2 1.1 1.1   GLUCOSE 122* 92 104*     Hepatic:   Recent Labs     08/15/21  0454 08/16/21  0442 08/17/21  0456   AST 10* 17 15   ALT 10 16 13   BILITOT 0.6 0.6 0.5   ALKPHOS 37* 36* 37*          CT abd/pelvis  1. No active contrast extravasation. 2. Diverticulosis without scan evidence for diverticulitis.        Ct chest:    No suspicious pulmonary nodules. [place] [facility] pleural fluid is seen           Colonoscopy:     Ulcerated 18mm mass with depressed center and raised margins. In the descending colon near splenic flexure likely. Biopsied. tattoo placed distal to the lesion.      Small polyp removed  Left colon diverticulosis  Hemorrhoids. Problem List  Active Problems:    Acute lower GI hemorrhage  Resolved Problems:         Assessment & Plan:   1. Acute lower GI bleed:  Likely due to colon Ca. No recent bleeding. Hgb at 9 today. ( has been drifting)   2. Colon mass:  Pathology pending. 3. HTN:  BP is not at goal.  I did discuss with cardiology,  Dr Audelia Villa and added hydralazine. She also recommended stopping the coreg and using bystolic 20 mg bid ,however this is not on formulary. 4. Pt would like plan in motion prior to his discharge. Will need follow up with Dr Arin Bronson: ADULT DIET;  Regular  Code:Full Code  DVT PPX       1108 MACHO Bashir - CNP   8/17/2021 8:04 AM

## 2021-08-18 ENCOUNTER — PREP FOR PROCEDURE (OUTPATIENT)
Dept: SURGERY | Age: 71
End: 2021-08-18

## 2021-08-18 ENCOUNTER — TELEPHONE (OUTPATIENT)
Dept: SURGERY | Age: 71
End: 2021-08-18

## 2021-08-18 ENCOUNTER — OFFICE VISIT (OUTPATIENT)
Dept: SURGERY | Age: 71
End: 2021-08-18
Payer: MEDICARE

## 2021-08-18 VITALS
SYSTOLIC BLOOD PRESSURE: 142 MMHG | HEIGHT: 73 IN | HEART RATE: 77 BPM | BODY MASS INDEX: 37.51 KG/M2 | DIASTOLIC BLOOD PRESSURE: 48 MMHG | WEIGHT: 283 LBS

## 2021-08-18 VITALS
TEMPERATURE: 98 F | HEART RATE: 74 BPM | WEIGHT: 280.5 LBS | RESPIRATION RATE: 18 BRPM | OXYGEN SATURATION: 98 % | BODY MASS INDEX: 37.17 KG/M2 | SYSTOLIC BLOOD PRESSURE: 140 MMHG | DIASTOLIC BLOOD PRESSURE: 72 MMHG | HEIGHT: 73 IN

## 2021-08-18 DIAGNOSIS — C18.6 MALIGNANT NEOPLASM OF DESCENDING COLON (HCC): Primary | ICD-10-CM

## 2021-08-18 DIAGNOSIS — E11.21 DIABETES MELLITUS WITH MICROALBUMINURIC DIABETIC NEPHROPATHY (HCC): ICD-10-CM

## 2021-08-18 LAB
GLUCOSE BLD-MCNC: 108 MG/DL (ref 70–99)
GLUCOSE BLD-MCNC: 115 MG/DL (ref 70–99)
GLUCOSE BLD-MCNC: 199 MG/DL (ref 70–99)
HCT VFR BLD CALC: 26.5 % (ref 40.5–52.5)
HEMOGLOBIN: 9 G/DL (ref 13.5–17.5)
MCH RBC QN AUTO: 27.3 PG (ref 26–34)
MCHC RBC AUTO-ENTMCNC: 33.9 G/DL (ref 31–36)
MCV RBC AUTO: 80.6 FL (ref 80–100)
PDW BLD-RTO: 15.1 % (ref 12.4–15.4)
PERFORMED ON: ABNORMAL
PLATELET # BLD: 187 K/UL (ref 135–450)
PMV BLD AUTO: 8.6 FL (ref 5–10.5)
RBC # BLD: 3.29 M/UL (ref 4.2–5.9)
WBC # BLD: 5.7 K/UL (ref 4–11)

## 2021-08-18 PROCEDURE — 4040F PNEUMOC VAC/ADMIN/RCVD: CPT | Performed by: SURGERY

## 2021-08-18 PROCEDURE — 6360000002 HC RX W HCPCS: Performed by: INTERNAL MEDICINE

## 2021-08-18 PROCEDURE — 99205 OFFICE O/P NEW HI 60 MIN: CPT | Performed by: SURGERY

## 2021-08-18 PROCEDURE — 6370000000 HC RX 637 (ALT 250 FOR IP): Performed by: NURSE PRACTITIONER

## 2021-08-18 PROCEDURE — 1036F TOBACCO NON-USER: CPT | Performed by: SURGERY

## 2021-08-18 PROCEDURE — G8417 CALC BMI ABV UP PARAM F/U: HCPCS | Performed by: SURGERY

## 2021-08-18 PROCEDURE — 2022F DILAT RTA XM EVC RTNOPTHY: CPT | Performed by: SURGERY

## 2021-08-18 PROCEDURE — 6370000000 HC RX 637 (ALT 250 FOR IP): Performed by: INTERNAL MEDICINE

## 2021-08-18 PROCEDURE — 1111F DSCHRG MED/CURRENT MED MERGE: CPT | Performed by: SURGERY

## 2021-08-18 PROCEDURE — C9113 INJ PANTOPRAZOLE SODIUM, VIA: HCPCS | Performed by: INTERNAL MEDICINE

## 2021-08-18 PROCEDURE — 85027 COMPLETE CBC AUTOMATED: CPT

## 2021-08-18 PROCEDURE — 3044F HG A1C LEVEL LT 7.0%: CPT | Performed by: SURGERY

## 2021-08-18 PROCEDURE — 1123F ACP DISCUSS/DSCN MKR DOCD: CPT | Performed by: SURGERY

## 2021-08-18 PROCEDURE — 3017F COLORECTAL CA SCREEN DOC REV: CPT | Performed by: SURGERY

## 2021-08-18 PROCEDURE — G8427 DOCREV CUR MEDS BY ELIG CLIN: HCPCS | Performed by: SURGERY

## 2021-08-18 PROCEDURE — 36415 COLL VENOUS BLD VENIPUNCTURE: CPT

## 2021-08-18 PROCEDURE — 2580000003 HC RX 258: Performed by: INTERNAL MEDICINE

## 2021-08-18 RX ORDER — METRONIDAZOLE 500 MG/1
TABLET ORAL
Qty: 3 TABLET | Refills: 0 | Status: ON HOLD | OUTPATIENT
Start: 2021-08-18 | End: 2021-09-05 | Stop reason: HOSPADM

## 2021-08-18 RX ORDER — SODIUM CHLORIDE 0.9 % (FLUSH) 0.9 %
5-40 SYRINGE (ML) INJECTION EVERY 12 HOURS SCHEDULED
Status: CANCELLED | OUTPATIENT
Start: 2021-08-18

## 2021-08-18 RX ORDER — NEOMYCIN SULFATE 500 MG/1
TABLET ORAL
Qty: 6 TABLET | Refills: 0 | Status: ON HOLD | OUTPATIENT
Start: 2021-08-18 | End: 2021-09-05 | Stop reason: HOSPADM

## 2021-08-18 RX ORDER — HYDRALAZINE HYDROCHLORIDE 25 MG/1
25 TABLET, FILM COATED ORAL EVERY 8 HOURS SCHEDULED
Qty: 90 TABLET | Refills: 3 | Status: SHIPPED | OUTPATIENT
Start: 2021-08-18 | End: 2022-01-18

## 2021-08-18 RX ORDER — ACETAMINOPHEN 325 MG/1
1000 TABLET ORAL ONCE
Status: CANCELLED | OUTPATIENT
Start: 2021-08-18 | End: 2021-08-18

## 2021-08-18 RX ORDER — SODIUM CHLORIDE 0.9 % (FLUSH) 0.9 %
5-40 SYRINGE (ML) INJECTION PRN
Status: CANCELLED | OUTPATIENT
Start: 2021-08-18

## 2021-08-18 RX ORDER — SODIUM CHLORIDE 9 MG/ML
25 INJECTION, SOLUTION INTRAVENOUS PRN
Status: CANCELLED | OUTPATIENT
Start: 2021-08-18

## 2021-08-18 RX ADMIN — Medication 1 TABLET: at 09:16

## 2021-08-18 RX ADMIN — CLONIDINE HYDROCHLORIDE 0.1 MG: 0.1 TABLET ORAL at 09:16

## 2021-08-18 RX ADMIN — HYDRALAZINE HYDROCHLORIDE 25 MG: 25 TABLET, FILM COATED ORAL at 05:43

## 2021-08-18 RX ADMIN — AMLODIPINE BESYLATE 10 MG: 5 TABLET ORAL at 09:16

## 2021-08-18 RX ADMIN — VALSARTAN 320 MG: 160 TABLET, FILM COATED ORAL at 09:16

## 2021-08-18 RX ADMIN — PANTOPRAZOLE SODIUM 40 MG: 40 INJECTION, POWDER, FOR SOLUTION INTRAVENOUS at 09:18

## 2021-08-18 RX ADMIN — CARVEDILOL 25 MG: 25 TABLET, FILM COATED ORAL at 09:15

## 2021-08-18 RX ADMIN — HYDROCHLOROTHIAZIDE 12.5 MG: 25 TABLET ORAL at 09:16

## 2021-08-18 RX ADMIN — Medication 10 ML: at 09:20

## 2021-08-18 ASSESSMENT — PAIN SCALES - GENERAL
PAINLEVEL_OUTOF10: 0
PAINLEVEL_OUTOF10: 0

## 2021-08-18 NOTE — LETTER
[address] [place] [address]  Ph  [phone]        Fax  [phone]    April Groves MD    Instructions for Outpatient Surgery    Patient Name: Milton Kidd:    [facility] of [id number] [address] [facility]    Date of Surgery: Friday 9/3/21 at 9:30AM Time to arrive: 7:30AM at the [facility] in the [address] [place] [address] Rd:  Wednesday 9/15/21 at 8:32CA    PLEASE BE CERTAIN TO FOLLOW THESE INSTRUCTIONS CAREFULLY BEFORE SURGERY    1. _x___ Do NOT eat or drink anything after midnight the evening before surgery. Food or drink intake of any kind will result in the cancellation of surgery. 2. _x___ A pre-operative exam MUST be performed by either your primary medical doctor or an Urgent Care. This must be within 30 days prior to your surgery date. 3. _x___ STOP all Blood Thinning medications AND Anti inflammatories; Aspirin, Coumadin & Plavix etc. 7 days prior to surgery. 4. _x___ Bowel prep the day before surgery. Clear liquid diet the day before procedure. Follow the prep instructions below. AFTER SURGERY  1. A responsible adult is REQUIRED to accompany you to the hospital and remain there for your surgery. If this arrangement has not been made at the time of your surgery, your surgery may be cancelled  2. You should not be left alone for 24  48 hours following surgery. 3. You should not drive, sign any important documents or make any important decisions until you have fully recovered from anesthesia (approximately 24  48 hours) AND are off all narcotic pain medications. · Any paperwork needing completion for either your employer or insurance company can be faxed, mailed or dropped off at our office to my attention. Please allow 7 business days for the paperwork to be completed.   You will be contacted once it has been completed at which time you will be able to pick it up or have it mailed. · NOTE: Due to HIPPA policy, completed paperwork cannot be faxed and per SHELBY VILLAGRAN [facility] of 5-[birth year], Fees for form completion may apply. If you have any questions, please feel free to call the number above.     Nati Blood MD'

## 2021-08-18 NOTE — PLAN OF CARE
Vitals:    08/18/21 0018   BP: 121/68   Pulse: 74   Resp: 16   Temp: 97.9 °F (36.6 °C)   SpO2: 95%       Problem: SAFETY  Goal: Free from accidental physical injury  Outcome: Ongoing     Problem: DAILY CARE  Goal: Daily care needs are met  Outcome: Ongoing     Problem: PAIN  Goal: Patient's pain/discomfort is manageable  Outcome: Ongoing  Note: Pt denies any pain at this time     Problem: SKIN INTEGRITY  Goal: Skin integrity is maintained or improved  Outcome: Ongoing     Problem: DISCHARGE BARRIERS  Goal: Patient's continuum of care needs are met  Outcome: Ongoing

## 2021-08-18 NOTE — PROGRESS NOTES
[address] [address] [address]  Dept: [phone]  Dept Fax: [phone]  Loc: [phone]    Visit Date: 8/18/2021    Author Sandoval is a 70 y.o. male who presents today for: Follow-up (Adenocarcinoma of colon )      HPI:       Author Sandoval is a 70 y.o. male who is actually known to me for previous screening colonoscopy in August 2018. He recently was having rectal bleeding and was admitted to [facility].  He underwent endoscopy by Dr. Ken Ron and was found to have an ulcerated region of the descending colon, biopsy revealed adenocarcinoma. Staging results revealed no evidence of metastatic disease. He is here today with his wife for further discussion regarding surgical intervention. Of note, he had not been having bleeding for any long period of time. He denies previous abdominal surgeries. He denies abdominal pain. He states his diabetes is fairly well controlled.       Past Medical History:   Diagnosis Date    CAD (coronary artery disease) 7/9/2004    Coronary artery disease involving native coronary artery of native heart without angina pectoris     2004 cath > Taxus stents distal/prox RCA 2006 Myoview> EF 69%, small inferior fixed defect distal RCA     Diabetes mellitus with microalbuminuric diabetic nephropathy ([address] 05/01/2015    157    Diverticulosis of colon 08/31/2018    moderate diffuse    DM type 2 (diabetes mellitus, type 2) ([address] 7/7/2014    ED (erectile dysfunction)     Epistaxis 01/01/2012    cautry    Homocysteinemia ([address] 09/24/2014    13    HTN (hypertension), benign 01/01/2001    moderate concentric left ventricular hypertrophy 2/16/17    Hyperlipidemia LDL goal < 70 7/12/2004    Low testosterone 05/17/2013    204    MI (myocardial infarction) ([address] 07/09/2004    with 2 stents RCA prox and distal    Neurofibromatosis (HCC)     right leg lateral neurofibroma    ANTONIETTA (obstructive sleep apnea) 06/19/2013    CPAP      PSA elevation 1/1/2000    No Bx    Sensorineural hearing loss, bilateral 7/3/2013    right > left    Vitamin D deficiency 01/27/2014    26     Past Surgical History:   Procedure Laterality Date    CARDIAC SURGERY  2004    STENTS    COLONOSCOPY  01/01/2006    NO POLYPS    COLONOSCOPY N/A 08/31/2018    Tubular adenoma. Every 5 year colonoscopy. Rectal bleeding for 4 days postop.  COLONOSCOPY N/A 8/16/2021    COLONOSCOPY POLYPECTOMY SNARE/COLD BIOPSY performed by Shira Avendaño MD at [address] [place] [address]  8/16/2021    COLONOSCOPY WITH BIOPSY performed by Shira Avendaño MD at [address] [place] [address]  8/16/2021    COLONOSCOPY SUBMUCOSAL SPOT INJECTION performed by Shira [facility]endaño MD at [facility]  7/12/2004    2 stents in RCA    LEG SURGERY Left ~2001    [facility]  Lat calf & lat     NASAL HEMORRHAGE CONTROL  1/1/2012       Current Outpatient Medications:     hydrALAZINE (APRESOLINE) 25 MG tablet, Take 1 tablet by mouth every 8 hours, Disp: 90 tablet, Rfl: 3    carvedilol (COREG) 25 MG tablet, TAKE 1 TABLET BY MOUTH TWICE A DAY, Disp: 60 tablet, Rfl: 0    atorvastatin (LIPITOR) 20 MG tablet, TAKE 1 TABLET BY MOUTH EVERY DAY, Disp: 30 tablet, Rfl: 0    amLODIPine (NORVASC) 10 MG tablet, TAKE 1 TABLET BY MOUTH EVERY DAY, Disp: 30 tablet, Rfl: 0    doxazosin (CARDURA) 8 MG tablet, TAKE 1 TABLET BY MOUTH EVERY NIGHT, Disp: 90 tablet, Rfl: 2    cloNIDine (CATAPRES) 0.1 MG tablet, TAKE 1 TABLET BY MOUTH TWICE A DAY, Disp: 180 tablet, Rfl: 1    valsartan-hydrochlorothiazide (DIOVAN-HCT) 320-12.5 MG per tablet, TAKE 1 TABLET BY MOUTH DAILY, Disp: 90 tablet, Rfl: 3    sildenafil (VIAGRA) 50 MG tablet, Take 1 tablet by mouth daily as needed for Erectile Dysfunction, Disp: 10 tablet, Rfl: 3    ONETOUCH DELICA LANCETS 20S MISC, Test blood sugar twice daily. , Disp: , Rfl: 3    travoprost, benzalkonium, (TRAVATAN) 0.004 % ophthalmic solution, Place 1 drop into both eyes nightly., Disp: , Rfl:     glucose blood VI test strips (ASCENSIA AUTODISC VI;ONE TOUCH ULTRA TEST VI) strip, 1 each by In Vitro route 2 times daily. As needed. , Disp: 100 each, Rfl: 3    Blood Glucose Monitoring Suppl (BLOOD GLUCOSE METER) KIT, 1 Device by Does not apply route 2 times daily. , Disp: 1 kit, Rfl: 0    aspirin 81 MG tablet, Take 81 mg by mouth daily. , Disp: , Rfl:     therapeutic multivitamin-minerals (THERAGRAN-M) tablet, Take 1 tablet by mouth daily. , Disp: , Rfl:   No Known Allergies  Past Surgical History:   Procedure Laterality Date    CARDIAC SURGERY  2004    STENTS    COLONOSCOPY  2006    NO POLYPS    COLONOSCOPY N/A 2018    Tubular adenoma. Every 5 year colonoscopy. Rectal bleeding for 4 days postop.     COLONOSCOPY N/A 2021    COLONOSCOPY POLYPECTOMY SNARE/COLD BIOPSY performed by Alexus Alcantara MD at [address] [facility] COLONOSCOPY  2021    COLONOSCOPY WITH BIOPSY performed by Alexus Alcantara MD at [address] [facility]  2021    COLONOSCOPY SUBMUCOSAL SPOT INJECTION performed by lAexus Alcantara MD at [facility]  2004    2 stents in RCA    LEG SURGERY Left ~    [facility]  Lat calf & lat     NASAL HEMORRHAGE CONTROL  2012     Family History   Problem Relation Age of Onset    High Blood Pressure Mother     High Blood Pressure Father     Lung Cancer Father         Smoker    Stroke Brother         Brain aneurysm    Hypertension Brother     Other Brother         Prostate    No Known Problems Son        Social History:   Social History     Tobacco Use    Smoking status: Former Smoker     Packs/day: 0.00     Years: 30.00     Pack years: 0.00     Types: Pipe     Quit date: 1976     Years since quittin.6    Smokeless tobacco: Never Used    Tobacco comment: [age] year 2 bowls /day   Substance Use Topics    Alcohol use: Yes     Alcohol/week: 0.0 standard drinks     Comment: Wine occasionally ie wedding/business event. Was drinking mid 34's 9-8 scotch 1 yr. Tobacco cessation counseling provided as appropriate. REVIEW OF SYSTEMS:    Pertinent positives and negatives are mentioned in the HPI. Otherwise, all other systems were reviewed and negative. Objective:     Physical Exam   BP (!) 142/48 (Site: Left Upper Arm, Position: Sitting, Cuff Size: Large Adult)   Pulse 77   Ht 6' 1\" (1.854 m)   Wt 283 lb (128.4 kg)   BMI 37.34 kg/m²   Constitutional: Appears well-developed and well-nourished. Grooming appropriate. No gross deformities. Body mass index is 37.34 kg/m². Eyes: No scleral icterus. Conjunctiva/lids normal. Vision intact grossly. Pupils equal/symmetric, reactive bilaterally. ENT: External ears/nose without defect, scars, or masses. Hearing grossly intact. No facial deformity. Lips normal, normal dentition. Neck: No masses. Trachea midline. No crepitus. Thyroid not enlarged. Cardiovascular: Normal rate. No peripheral edema. Abdominal aorta normal size to palpation. Pulmonary/Chest: Effort normal. No respiratory distress. No wheezes. No use of accessory muscles. Musculoskeletal: Normal range of motion of head/neck, without deformity, pain, or crepitus, with normal strength and tone. Normal gait. Nails without clubbing or cyanosis. Neurological: Alert and oriented to person, place, and time. No gross deficits. Sensation intact. Skin: Skin is dry. No rashes noted. No pallor. No induration of nodules. Psychiatric: Normal mood and affect. Behavior normal. Oriented to person, place, and time. Judgment and insight reasonable.     Abdominal/wound: Soft, nontender, obese    Labs reviewed: CBC, BMP reviewed from hospitalization; CEA ordered     Imaging reviewed: CT scan of the chest, abdomen, pelvis reviewed in person interpreted, no evidence of metastatic disease    Review of healthy patients, complications can occur, including possibility of death. I provided and encouraged patients and family members to review AVS (after visit summary) information that I have provided, that contains even more information regarding surgical risks and complications. They were encouraged to reach out to my office if they have any questions before or after surgery. Patient understands higher risk of perioperative morbidity and mortality given: age, obesity, diabetes     Continue with current prescription medications    I provided written information in the After Visit Summary AVS regarding: colon cancer    DISPOSITION:  Plan surgery in coming weeks    My findings will be relayed to consulting practitioner or PCP via Epic note    Note completed using dictation software, please excuse any errors.     Electronically signed by Diana Moore MD on 8/18/2021 at 2:27 PM

## 2021-08-18 NOTE — PLAN OF CARE
Problem: PAIN  Goal: Patient's pain/discomfort is manageable  8/18/2021 0955 by Tia Greer, RN  Outcome: Ongoing  Note: Pt denies pain at this time. No acute distress noted. Will continue to assess.    8/18/2021 0401 by Elizabeth Vigil RN  Outcome: Ongoing  Note: Pt denies any pain at this time

## 2021-08-18 NOTE — TELEPHONE ENCOUNTER
Patient has been scheduled for:    Procedure: Lap L Colectomy  Date: Friday 9/3/21  Time: 9:30AM  Arrival: 7:30AM  [facility]  ASA?: Aspirin 7 days   Prep? #2 reviewed over phone and emailed. ABX sent to pharmacy. Pre-op? Patient will call pcp to setup before surgery. He will also have bloodwork done. Post-op Appt? Wednesday 9/15/21 at 1:15pm    Patient advised they will need a . Orders faxed to surgery scheduling.     Instructions have been emailed to:  [email or web address]

## 2021-08-18 NOTE — PATIENT INSTRUCTIONS
Colon Cancer Patient Information:        OVERVIEW    Colon cancer is a common malignancy in the [place]. The treatment of patients with colon cancer can be complicated and may require a team of surgical and medical specialists. This review provides general information for patients and their families, covering colon cancer, its risk factors and symptoms, cancer evaluation and staging, and the most common methods of treatment. Because the treatment and prognosis for rectal cancer often differ from colon cancer, rectal cancer is addressed in a separate review. WHAT IS THE COLON? The colon and rectum are parts of the digestive system. They form a long, muscular tube called the large intestine (also called the large bowel). The colon is the first 4 to 5 feet of the large intestine, and the rectum is the last six inches. Partially digested food enters the colon from the small intestine. The colon removes water from the food and turns the rest into waste (stool). The waste passes from the colon into the rectum and then out of the body through the anus (opening). WHAT IS COLON CANCER? Cancer begins in cells, the building blocks that make up tissues. Tissues make up the organs of the body. Normally, cells grow and divide to form new cells as the body needs them. When cells grow old, they die, and new cells take their place. Sometimes, this orderly process goes wrong. In patients with colon cancer, new cells form when the body does not need them, and old cells do not die when they should. Cancer that begins in the colon is called colon cancer, and cancer that begins in the rectum is called rectal cancer. Cancer that starts in either of these organs may also be called colorectal cancer. These cancer cells have the ability to invade into the wall of the colon or spread to lymph nodes or other organs.    Surgical treatment for colon cancer is usually directed at removal of the cancer and lymph nodes near the cancer. Medical treatment of colon cancer is usually an adjunct to the surgical treatment. HOW COMMON IS COLON CANCER? In 2012, more than 103,000 people in the [place] were diagnosed with colon cancer, making it the fourth most common cancer in both men and women. About 5% of Americans will develop colorectal cancer during their lifetimes. Cancer of the colon is  usually preventable, highly treatable, and often curable. Still, over 50,000 Americans die annually from colorectal cancer, making it the second leading cause of cancer-related deaths. Surgery is the primary form of treatment. Patient survival is directly related to the stage of the cancer (how advanced it is). The stage of the cancer is most accurately defined by the pathology report obtained after surgical removal  and biopsy of  cancerous tissue. The earlier a colon cancer is found and treated, the better. Recurrence of cancerous tissue  following surgery is a major problem and is often the ultimate cause of death. WHAT ARE THE RISK FACTORS FOR COLON CANCER? No one knows the exact causes of colorectal cancer. Doctors often cannot explain why one person develops this disease and another does not. However, it is clear that colorectal cancer is not contagious. No one can catch this disease from another person. Research has shown that people with certain risk factors are more likely than others to develop colorectal cancer. A risk factor is something that may increase the chance of developing a disease. Age over 48: Colorectal cancer is more likely to occur as people get older. More than 90% of people with this disease are diagnosed after age 48. The average age at diagnosis is 67. Diet:  Studies suggest that diets high in fat (especially animal fat) and low in calcium, folate and fiber may increase the risk of colorectal cancer.    In addition, some studies suggest that people who eat a diet very low in fruits and vegetables may have a higher risk of colorectal cancer. However, results from diet studies do not always agree, and more research is needed to better understand how diet affects the risk of colorectal cancer. Colorectal polyps: Polyps are growths on the inner lining of the colon or rectum. They are common in people over age 48. Most polyps are benign (not cancerous), but some polyps can progress to become cancer. Finding and removing polyps reduces the risk of colorectal cancer. Family history of colorectal cancer: First-degree relatives (parents, brothers, sisters, or children) of a person with a history of colorectal cancer are somewhat more likely to develop this disease themselves, especially if the relative had the cancer at a young age. If several close relatives have a history of colorectal cancer, the risk is even greater. Genetic alterations: Changes in certain genes increase the risk of colorectal cancer. Hereditary nonpolyposis colon cancer (HNPCC) is the most common type of inherited (genetic) colorectal cancer. However, it only accounts for a small percentage of all colorectal cancer cases. It is caused by changes in a specific gene. Most people with an altered HNPCC gene develop colon cancer, and the average age at diagnosis of colon cancer is 40. Familial adenomatous polyposis (FAP) is a rare, inherited condition in which hundreds of polyps form in the colon and rectum. It is caused by a change in a specific gene called the APC gene. Unless FAP is treated, it usually leads to colorectal cancer by age 36. FAP accounts for less than 1 percent of all colorectal cancer cases. Personal history of cancer: A person who has already had colorectal cancer may develop colorectal cancer a second time, so it is important to follow-up closely with your treating physicians after undergoing treatment.  Also, women with a history of cancer of the ovary, uterus, or breast are at a somewhat higher risk of developing colorectal cancer. Ulcerative colitis or Crohns disease: A person who has had these conditions that cause inflammation of the colon for many years is at increased risk of developing colorectal cancer. Cigarette smoking: A person who smokes cigarettes may be at increased risk of developing polyps and colorectal cancer. If you have colorectal cancer, you also may be concerned that your family members may develop the disease. People who think they may be at risk should talk to their doctor. The doctor may be able to suggest ways to reduce the risk and can plan an appropriate schedule for checkups. CAN COLON CANCER BE PREVENTED? Colon cancer is largely preventable. The most important step in preventing colon cancer is getting a screening test. Because of the demonstrated slow growth of most primary lesions, better survival of patients with early-stage lesions, and relative simplicity and accuracy of screening tests, screening for colon cancer should be a part of routine care for all adults aged 48 years or older. Those with close relatives with colorectal cancer should start screening earlier. Screening tests other than colonoscopy can include testing the stool for blood (hemoccult), fecal DNA, a flexible sigmoidoscopy (shorter scope), or xray (barium and/or air enema or a special type of CT scan). Any abnormal screening test should be followed by a colonoscopy. Colonoscopy involves examination using a flexible lighed instument to examine the entire large bowel. Polyps can be identified and can often be removed during colonoscopy. For this reason, most healthcare providers recommend starting with colonoscopy as the screening test.    There is some evidence that a high fiber, low fat diet might help prevent colorectal cancer. Family members of people who have HNPCC or FAP can have genetic testing to check for specific genetic changes.  For those with  detectable abnormalities in their genes, health care providers may suggest ways to try to reduce the risk of colorectal cancer, or to improve the detection of this disease. For some patients, the doctor may recommend an operation to remove all or part of the colon and rectum before developing a cancer. WHAT ARE THE SYMPTOMS OF COLON CANCER? Many colon cancers cause no symptoms at all and are detected during routine screening examinations. The most common symptom of colorectal cancer is a change in bowel habits. Many of the symptoms often associated with the disease are not due to cancer. Other common non-cancerous health problems can cause the same symptoms. However, anyone with the following symptoms should see a doctor to be diagnosed and treated as early as possible. Symptoms of colon cancer may include:    Having diarrhea or constipation  Feeling that your bowel does not empty completely  Finding blood (either bright red or very dark) in your stool  Finding your stools are narrower than usual  Frequently having gas pains or cramps, or feeling full or bloated  Losing weight with no known reason  Fatigue  Having nausea or vomiting  Usually, early cancer does not cause pain. It is important not to wait to feel pain before seeing a doctor. HOW IS COLON CANCER DIAGNOSED? If screening test results suggest cancer or you have symptoms, your doctor must find out whether they are due to cancer or some other cause. Colonoscopy is the most common method for diagnosing colon cancer, although other tests can be suggestive of the diagnosis. During colonoscopy, any abnormal area (such as a polyp) is checked for cancer cells. Often, the abnormal tissue can be removed entirely during colonoscopy. A pathologist checks the tissue for cancer cells using a microscope. Currently, there are no reliable blood tests for diagnosing colon cancer. ARE THERE DIFFERENT TYPES OF COLON CANCER?     Yes, however, more than 95% of all colorectal cancers are called adenocarcinomas. These cancers arise from cells in the lining of the colon or rectum. When doctors talk about colorectal cancer, this is almost always what they are referring to. The treatment of adenocarcinoma is the focus of this review. Other much less common tumor types include carcinoid tumors, gastrointestinal stromal tumors (GISTs), lymphomas and sarcomas. HOW IS COLON CANCER EVALUATED AND STAGED? Once a diagnosis of colon cancer is made, your doctor needs to know the extent (clinical stage) of the disease to plan the best treatment. The stage is based on whether the tumor has invaded nearby tissues, whether the cancer has spread and, if so, to what parts of the body. When colorectal cancer spreads outside the colon or rectum, cancer cells are often found in nearby lymph nodes. If cancer cells have reached these nodes, they may also have spread to other lymph nodes or other organs. After spreading to the lymph nodes, colorectal cancer cells most often spread to the liver. Doctors call this \"distant\" or metastatic disease. When cancer spreads from its original place to another part of the body, the new tumor has the same kind of abnormal cells and the same name as the original tumor. For example, if colorectal cancer spreads to the liver, the cancer cells in the liver are actually colorectal cancer cells. The disease is metastatic colorectal cancer and not liver cancer. For that reason, it is treated as colorectal cancer, not liver cancer. When possible, a thorough diagnostic evaluation should be accomplished prior to undergoing treatment for colon cancer. In addition to taking a personal medical and family history and performing a physical exam, your doctor may order the following tests:    Blood tests: These should include complete cell counts (to check for anemia), standard blood chemistries and CEA (carcinoembryonic antigen) level.  CEA is produced by some colon cancers and is useful in detecting cancer recurrence. Colonoscopy:  Colonoscopy should be performed to evaluate the entire colon and rectum prior to surgery unless the patients clinical condition does not allow the colonoscopy to be performed. Colonoscopy provides useful information about the overall health of the colon and allows for biopsy of the cancer and removal of polyps in other areas of the large intestine. CT (computed tomography) scan: An x-ray machine linked to a computer takes a series of detailed pictures of areas inside your body. A CT scan may show whether cancer has spread to the liver, lungs, or other organs. Other imaging tests such as MRI (magnetic resonance imaging) and PET (positron emission tomography) scans may be ordered by your doctor for specific indications. Doctors describe colorectal cancer by the following stages:          Stage 0: The cancer is found only in the innermost lining of the colon or rectum. Carcinoma in situ is considered to be Stage 0 colon cancer. Stage I: The tumor has grown into the inner wall of the colon or rectum. The tumor has not grown through the wall. Stage II: The tumor extends more deeply into or through the wall of the colon or rectum. It may have invaded nearby tissue, but cancer cells have not spread to the lymph nodes. Stage III: The cancer has spread to nearby lymph nodes, but not to other parts of the body. Stage IV: The cancer has spread to other parts of the body, such as the liver or lungs. Often, final staging is not complete until after surgery to remove the tumor, when the lymph nodes can be evaluated for cancer under a microscope. HOW IS COLON CANCER TREATED? The mainstay of treatment is surgery, and the choice of treatment depends mainly on the location of the tumor in the colon and the stage of the disease. Chemotherapy, or a combination of treatments may also be added.  Unlike rectal cancer, radiation therapy is rarely used for colon cancer, although it may have a role in treating advanced or recurrent tumors. Each treatment has certain risks that are described below. The patient may also choose no treatment at all. However, without any treatment, the colon cancer will almost certainly continue to grow and spread to other locations, possibly leading to complete bowel obstruction, perforation of the intestine, and ultimately, death. When colon cancer is diagnosed at a late stage (IV), chemotherapy may be given either as the only treatment or prior to surgery. HOW IS SURGERY FOR COLON CANCER PERFORMED, AND WHAT IS THE RECOVERY? Surgical removal of the involved portion of the colon is the most common treatment for colorectal cancer, and it renders many patients disease-free without the need for additional therapy. Two main surgical approaches for treating colon cancer exist:    Laparoscopy: Colon cancer may be removed with the aid of a thin, lighted tube (a laparoscope) connected to a video screen. Three or four tiny cuts are made into your abdomen. The surgeon sees inside your abdomen with the laparoscope and is able to use small instruments to free the colon up from its attachments in the body. The tumor and nearby lymph nodes are then removed along with part of the healthy colon. The surgeon checks the rest of your intestine and the liver to see if the cancer has spread. The laparoscope may also be used with new approaches to cancer removal such as robotic surgery. Your surgeons can discuss the pros and cons of these new technologies. Open surgery: The surgeon makes a larger incision on your abdomen to remove the tumor and part of the healthy colon or rectum. Nearby lymph nodes are also removed. The surgeon checks the rest of your intestine and liver to see if the cancer has spread.   In up to 20% of operations for colon cancer, the surgery cannot safely be accomplished with laparoscopy, so an open operation is performed or a laparoscopic surgery is converted to an open surgery at the same time as the laparoscopic operation. Both techniques require general anesthesia and approximately 3  7 days spent recovering in the hospital. Several large studies have demonstrated that both approaches produce equivalent cancer outcomes when performed by surgeons with sufficient training in colorectal surgery. The surgeon removes the colon cancer and the same amount of normal colon and lymph nodes with either approach. There are specific indications for choosing laparoscopy or open surgery; your surgeon will discuss these features with you prior to the operation. When a section of your colon or rectum is removed, the surgeon can usually reconnect the healthy ends of your intestine. This is called an anastomosis. However, sometimes reconnection is not possible. In this case, the surgeon creates a new path for waste to leave your body. The surgeon makes an opening (stoma) in the wall of the abdomen, connects the upper end of the intestine to the skin, and closes the other end. The operation to create the stoma is called a colostomy. A flat bag fits over the stoma to collect waste, and a special adhesive holds it in place. Less commonly for colon cancer, the small intestine may be used to create a stoma (an ileostomy). For many people, a temporary stoma can be reversed a few months later. The time it takes to heal after surgery is different for each person. You may be uncomfortable for the first few days. Medicine can help control your pain; you should be comfortable enough to stand and walk with assistance. Before surgery, you should discuss the plan for pain relief with your doctor or nurse. After surgery, your doctor can adjust the plan if you need more pain relief. It is common to feel tired or weak for awhile. Surgery can also cause postoperative constipation or diarrhea.   Your surgeon can provide instructions on the management of these conditions. In addition, your health care team monitors you for signs of bleeding, infection, or other problems requiring immediate treatment. After discharge from the hospital, you should resume light activity such as walking and personal care. Your surgeon may restrict you from lifting heavy objects for a certain time to reduce the risk of developing a hernia (a bulge under your abdominal incision). Generally, you may resume a normal diet, and adequate fluid intake is especially important. Ask your surgeon or nurse prior to discharge about resuming your prior medications in addition to any new medicine you may be taking. RISKS OF COLON SURGERY    Colon surgery is major surgery, and has risks. Even in healthy patients, complications can happen. Some of these risks can include (but are not limited to): bleeding, wound infections, deeper infections, hernias (especially with open surgery), scar tissue, missed lesions, unexpected findings, need to go back to the operating room or have another procedure, need for temporary or permanent stoma (see above), damage to other structures (such as intestine, blood vessels, other organs, and nerves), blood clots, pneumonia, heart attack, kidney failure, urinary infections, anastomotic leak, and even death. Anastomotic leak occurs if the bowel reconnection does not heal properly. Even in healthy patient with no medical problems, this can occur. Symptoms can include rectal bleeding, fevers, bloating, change in appetite, change in heart rate and generally not feeling well. Sometimes it can be difficult to diagnose a leak given the big overall with normal recovery and other problems that occur with surgery. Leaks occur most commonly within the first 1-2 weeks after surgery, but can be found much later as well. If a leak does happen, there is a wide range of possible effects, depending on the severity.  With small/minor leaks, the leak may seal itself off, and some patients may not notice any changes. Some may have low grade fevers or change in appetite. In some leaks, a fluid collection (abscess) may develop that can typically be treated with antibiotics and sometimes a drainage tube placed by a radiologist. Fluid collections can sometimes heal spontaneously, or sometimes develop into a long term fistula, or abnormal connection to the skin, or another organ. Occasionally these can require a second surgery if it does resolve over a reasonable amount of time (typically 3-6 months). Another long term problem with an imperfect healing anastomosis is a stricture, or over-tightening of the colon, that can cause difficulties eliminating stool. In patients with a substantial or large leak, this can be quite dangerous, and can cause sepsis. Emergency reoperation may be necessary to control the stool spillage in this situation. This last scenario is especially worrisome for need for a stoma, long term issues with bowel function, prolonged recovery, or even death. Many of these complications are increased in those with risk factors such as: current and previous smokers, poorly controlled diabetics, alcohol drinkers, patients with previous abdominal surgery, patients with history of chemotherapy or radiation, those who take steroids or blood thinners, patients with increased disposition to heart, lung, or kidney problems and those who are obese. You will be under general anesthesia (completely asleep) during the operation. There are risks of anesthesia, especially with longer operations, that will be discussed in greater detail with you by the anesthesiologist on the day of surgery. Some of these risks include changes in breathing or circulation, allergic reactions to medications, need to be on the ventilator after surgery. Please reach out to your surgeon if you have any questions about these risks and complications.     AFTER SURGERY, WHAT ADDITIONAL THERAPY IS NEEDED FOR COLON CANCER? Patients in whom colon cancer is found in the lymph nodes (stage III) or distant locations (stage IV) are normally recommended to undergo chemotherapy after surgery if medically-fit. Chemotherapy uses anticancer drugs to kill cancer cells. Studies have shown that chemotherapy improves long-term survival by reducing the risk of cancer recurrence in patients with stage III colon cancer. Most patients who have colon cancer without spread to lymph nodes or other sites (stage I or II) are treated effectively with surgery alone, although some of these patients with certain risk factors also may benefit from chemotherapy. Anticancer drugs are usually given through a vein, but some may be given by mouth. Currently, the most common chemotherapy drugs given to patients with colon cancer are 5-fluorouracil (also called 5-FU) and oxaliplatin. Others may be utilized in certain situations, as directed by your medical team. Brigida Halstead may be treated several weeks after surgery in an outpatient part of the hospital, at the doctor's office, or at home. Treatment does not usually require overnight hospitalization. In addition to killing cancer cells, chemotherapy drugs can harm normal cells that divide rapidly. The side effects of chemotherapy depend mainly on the specific drugs and the dose:    Blood cells: These cells fight infection, help blood to clot, and carry oxygen to all parts of your body. When drugs affect your blood cells, you are more likely to get infections, bruise or bleed easily, and feel very weak and tired. Cells in hair roots: Chemotherapy can cause hair loss. Your hair will grow back, but it may be somewhat different in color and texture. Chemotherapy given for colorectal cancer does not generally cause complete hair loss. Cells that line the digestive tract: Chemotherapy can cause poor appetite, nausea and vomiting, diarrhea, or mouth and lip sores.   Chemotherapy for colorectal cancer can cause the skin on the palms of the hands and bottoms of the feet to become red and painful. The skin may peel off. Some patients also develop numbness or tingling in their hands and feet. Your health care team can suggest ways to control many of these side effects. Most side effects usually go away after treatment ends. WHAT FACTORS INFLUENCE OUTCOME AFTER TREATMENT? The likely outcome of patients with colon cancer is most often related to the stage. Patients with cancer that is confined to the lining of colon have the best prognosis. This is the reason why early detection through screening methods like colonoscopy is so important. Factors such as colon perforation or obstruction, or cancer involvement of other organs, lead to a worse outcome. Some features seen on examination under the microscope after surgery also can influence outcome. Your surgeon will discuss the specific findings with you during first post-operative visit. WHAT IS THE FOLLOW-UP AFTER TREATMENT FOR COLON CANCER? Follow-up care after treatment for colon cancer is important. Even after potentially curative surgery and use of additional treatments such as chemotherapy, patients may develop recurrence of the cancer. The risk of recurrence is high if the primary disease is more advanced. Your doctors monitor your recovery and check for recurrence of the cancer. Checkups help ensure that any changes in health are noted and treated if needed. Checkups may include a physical exam (including a digital rectal exam), lab tests (CEA test), colonoscopy, x-rays, CT scans, or other tests. Routine exams generally occur every 3-6 months for a few years, while CT scans or other imaging tests and colonoscopy are performed 1 year after surgery. The frequency of subsequent exams is based upon results of the prior exam.    CONCLUSION    Colon cancer is a common malignancy that causes a significant number of deaths.  However, it is

## 2021-08-18 NOTE — PROGRESS NOTES
CLINICAL PHARMACY NOTE: MEDS TO BEDS    Total # of Prescriptions Filled: 1   The following medications were delivered to the patient:  · Hydralazine 25 mg    Additional Documentation:    Patient wife picked up from Outpatient Pharmacy-signed  Elodia Everett CPhT

## 2021-08-18 NOTE — DISCHARGE SUMMARY
[facility] DISCHARGE SUMMARY    Patient Demographics    Patient. Prachi Johnson  Date of Birth. 1950  MRN. [id number]     Primary care provider. Chele Tafoya MD  (Tel: [phone])    Admit date: 8/14/2021    Discharge date 8/18/2021  Note Date: 8/18/2021     Reason for Hospitalization. Chief Complaint   Patient presents with    Rectal Bleeding     PT presents with c/o stool in blood since 0500 this AM.          Significant Findings. Active Problems:    Acute lower GI hemorrhage  Resolved Problems:    * No resolved hospital problems. *       Problems and results from this hospitalization that need follow up. 1. Invasive adenocarcinoma: Follow up with Dr Florida Bell [phone] today at 1: 45 pm     Significant test results and incidental findings. FINAL DIAGNOSIS:     A. Colon, descending, polyp, biopsy:      - One fragment of tubular adenoma.      - Separate fragment of adenocarcinoma- See Comment. B. Colon, mass, biopsy:      - Adenocarcinoma, invasive. COMMENT: Part A: It is unclear if the fragment of adenocarcinoma   represents carry-over/ contamination from part B colon mass; please   correlate with endoscopic procedure.     JINMI/ARTHUR       Preoperative Diagnosis:  Hematochezia   Postoperative Diagnosis:  Hematochezia     SPECIMEN:   A.  DESCENDING COLON POLYP X 1   B.  ULCERATED COLON MASS BIOPSIES     GROSS DESCRIPTION:   A. Received in formalin, labeled \"Benton Cody\" and \"descending colon   polyp\" are multiple fragments of brown-tan soft tissue ranging from   dust-like to 0.3 cm.  The fragments are submitted in their entirety in   cassette A.  All fragments may not represent tissue.      B. Received in formalin, labeled \"Benton Cody\" and \"ulcerated colon   mass biopsy\" are multiple fragments of gray-white soft tissue ranging   from dust-like to 0.3 cm.  The fragments are submitted in their entirety   in cassette LAMIN.   KERMIT/HENRY     MICROSCOPIC DESCRIPTION: Microscopic examination performed. CPT: 16512 X2     Case signed out at [facility], [address]   [facility], [facility] [facility] at [facility], [address] [facility] [address]  Phone [phone]         CT abd/pelvis  1. No active contrast extravasation. 2. Diverticulosis without scan evidence for diverticulitis.         Ct chest:     No suspicious pulmonary nodules. Lalla Khai pleural fluid is seen           Invasive procedures and treatments. C scope   Ulcerated 18mm mass with depressed center and raised margins.  In the descending colon near splenic flexure likely.   Biopsied.  tattoo placed distal to the lesion. [facility] Course. The patient was admitted from home with BRBPR. He was admitted and followed by GI and had a C scope which showed 18 mm lesion that was positive for adenocarcinoma. Following the C scope he had no further bleeding issues. His hgb was stable at 9 and he did not require any transfusions. On the day of d/c he had an appointment to see Dr Florida Bell to discuss surgery. HTN:  BP was initially elevated, however it was markedly improved with the addition of hydralazine. Script was filled prior to his discharge home. He does have follow up with cardiology in October. He was feeling well and tolerating a diet at the time of d/c. Consults. IP CONSULT TO HOSPITALIST  IP CONSULT TO GI    Physical examination on discharge day. /75   Pulse 71   Temp 98 °F (36.7 °C) (Oral)   Resp 18   Ht 6' 1\" (1.854 m)   Wt 280 lb 8 oz (127.2 kg)   SpO2 99%   BMI 37.01 kg/m²   General appearance. Alert. Looks comfortable. Alert and very pleasant   HEENT. Sclera clear. Moist mucus membranes. Cardiovascular. Regular rate and rhythm, normal S1, S2. No murmur. Respiratory. Not using accessory muscles. Clear to auscultation bilaterally, no wheeze. Gastrointestinal. Abdomen soft, non-tender, not distended, normal bowel sounds  Neurology. Facial symmetry. No speech deficits. Moving all extremities equally. Extremities. No edema in lower extremities. Skin. Warm, dry, normal turgor    Condition at time of discharge stable     Medication instructions provided to patient at discharge. Medication List      START taking these medications    hydrALAZINE 25 MG tablet  Commonly known as: APRESOLINE  Take 1 tablet by mouth every 8 hours  Notes to patient: Hydralazine  Use: treat heart failure, lower blood pressure  Side effects: headache and dizziness        CONTINUE taking these medications    amLODIPine 10 MG tablet  Commonly known as: NORVASC  TAKE 1 TABLET BY MOUTH EVERY DAY  Notes to patient: Calcium Channel Blocker  Use: to treat high blood pressure, chest pain  Side effects: swelling of ankles, dizziness, fast or irregular heartbeat, shortness of breath     aspirin 81 MG tablet  Notes to patient: Aspirin  Use: Prevents heart attacks & strokes. Side effects: bleeding or bruising more easily, upset stomach. atorvastatin 20 MG tablet  Commonly known as: LIPITOR  TAKE 1 TABLET BY MOUTH EVERY DAY  Notes to patient:    Atorvastatin (Lipitor®)  Use: lower bad cholesterol   Side effects: headache, muscle pains, constipation, diarrhea. Blood Glucose Meter Kit  1 Device by Does not apply route 2 times daily. blood glucose test strips strip  Commonly known as: ASCENSIA AUTODISC VI;ONE TOUCH ULTRA TEST VI  1 each by In Vitro route 2 times daily. As needed.      carvedilol 25 MG tablet  Commonly known as: COREG  TAKE 1 TABLET BY MOUTH TWICE A DAY  Notes to patient: Use: for heart rate control, lower blood pressure, prevent heart failure  Side effects: fatigue, dizziness, slow heart rate     cloNIDine 0.1 MG tablet  Commonly known as: CATAPRES  TAKE 1 TABLET BY MOUTH TWICE A DAY  Notes to patient: Use: to treat high blood pressure  Side effects: swelling of the feet and/or lower legs, depression, dizziness     doxazosin 8 MG tablet  Commonly known as: CARDURA  TAKE 1 TABLET BY MOUTH EVERY NIGHT  Notes to patient: Use: to treat high blood pressure, chest pain  Side effects: dizziness, fast or irregular heartbeat, confusion     OneTouch Delica Lancets 75Y Misc     sildenafil 50 MG tablet  Commonly known as: VIAGRA  Take 1 tablet by mouth daily as needed for Erectile Dysfunction  Notes to patient: Use: erectile dysfunction, pulmonary hypertension  Side effects: bladder pain, painful urination, urinary frequency, stomach upset, dizziness, (notify physician of the following) - chest pain, visual disturbances        therapeutic multivitamin-minerals tablet  Notes to patient: Use: multivitamin  Side effects: nausea     travoprost (benzalkonium) 0.004 % ophthalmic solution  Commonly known as: TRAVATAN     valsartan-hydroCHLOROthiazide 320-12.5 MG per tablet  Commonly known as: DIOVAN-HCT  TAKE 1 TABLET BY MOUTH DAILY  Notes to patient: Use: to treat high blood pressure  Side effects:  Nose and throat irritation, dizziness, urination           Where to Get Your Medications      These medications were sent to [facility], [address] [place] [address] 37, [address]    Phone: [phone]   · hydrALAZINE 25 MG tablet         Discharge recommendations given to patient. Follow Up. Today with colo rectal surgeon  Disposition. Home   Activity. As tolerated   Diet: ADULT DIET; Regular      Spent > 30  minutes in discharge process.     Signed:  MACHO Hawkins CNP     8/18/2021 7:58 AM

## 2021-08-18 NOTE — LETTER
[facility] [address] [place] [address] [phone]  f [phone]    Barbara Robles MD                        SURGERY ORDER   -- Time of order -- 21    2:38 PM    [facility]  # _________________                                                                                    Scheduled By:____________                  Surgery Date & Time: Friday 9/3/21 at 9:30AM                 Pt arrival: 7:30AM                                                                                      Patient Name:  Verito Mueller     :  1950     PCP:  Jerilyn Serrano MD      Home Ph:    [phone] (home) [phone] (work)                                                    PROCEDURE:  Laparoscopic left colectomy, possible open 17023    DIAGNOSIS:  Descending colon cancer C20    Anesthesia: _General    Anesthesia (lines, blocks, TAP/epidural, etc): exparel TAP block  Time Needed:  2.5 hours    Pt Position:  lithotomy    Ureteral Stents: no  Ostomy Marking: no          Admit _x__                  Pre-Op clearance to be done by: _PCP____    Cardiac Clearance Done by: _N/A_______    Medications to be stopped 7 days before surgery: Aspirin                                                                                                                                                                                                         Barbara Robles MD                          COLON SURGERY CHECKLIST    -- Pre-op clearance: PCP  -- Surgery order faxed, date/time obtained, placed on calendar  -- Prep and oral antibiotics (#2) ordered, information given to patient  -- Phone call day before procedure to confirm  -- Post op appt: 2 weeks  -- Other: needs CEA drawn

## 2021-08-19 ENCOUNTER — CARE COORDINATION (OUTPATIENT)
Dept: CASE MANAGEMENT | Age: 71
End: 2021-08-19

## 2021-08-19 NOTE — CARE COORDINATION
[facility] Transitions Initial Follow Up Call    Call within 2 business days of discharge: Yes    Patient: Charanjit Butts Patient : 1950   MRN: [id number]  Reason for Admission: Acute lower GI hemorrhage; CA  Discharge Date: 21 RARS: Readmission Risk Score: 11    First attempt at 24 hour discharge call, no answer, CTN left VM with contact information and request for return call. CTN will continue with outreach call attempts.       Pal Rubin RN

## 2021-08-20 ENCOUNTER — CARE COORDINATION (OUTPATIENT)
Dept: CASE MANAGEMENT | Age: 71
End: 2021-08-20

## 2021-08-20 DIAGNOSIS — K92.2 ACUTE LOWER GI HEMORRHAGE: Primary | ICD-10-CM

## 2021-08-20 PROCEDURE — 1111F DSCHRG MED/CURRENT MED MERGE: CPT | Performed by: INTERNAL MEDICINE

## 2021-08-20 NOTE — CARE COORDINATION
[facility] Transitions Initial Follow Up Call:  Patient reports that he is doing well, having normal BM's without bleeding. Discussed discharge instructions and reviewed medications, 1111F completed. He will follow up with PCP 21. CTN will continue with outreach follow up calls. Call within 2 business days of discharge: Yes    Patient: Surjit Saha Patient : 1950   MRN: [id number]  Reason for Admission: Acute lower GI hemorrhage; CA  Discharge Date: 21 RARS: Readmission Risk Score: 11      Last Discharge [facility]       Complaint Diagnosis Description Type Department Provider    21 Rectal Bleeding Gastrointestinal hemorrhage, unspecified gastrointestinal hemorrhage type ED to Hosp-Admission (Discharged) (ADMITTED) FZ 3T Ghassan Urias MD; Farrah Delgado,... Spoke with: [address] Initial Call    Was this an external facility discharge? No Discharge Facility:     Challenges to be reviewed by the provider   Additional needs identified to be addressed with provider: No  none             Method of communication with provider : none      Advance Care Planning:   Does patient have an Advance Directive: reviewed and current, reviewed and needs to be updated, not on file; education provided, not on file, patient declined education, decision maker updated and referral to internal ACP facilitator. Was this a readmission? No  Patient stated reason for admission: Acute lower GI hemorrhage; CA  Patients top risk factors for readmission: medication management    Care Transition Nurse (CTN) contacted the patient by telephone to perform post hospital discharge assessment. Verified name and  with patient as identifiers. Provided introduction to self, and explanation of the CTN role. CTN reviewed discharge instructions, medical action plan and red flags with patient who verbalized understanding.  Patient given an opportunity to ask questions and does not have any further questions or concerns at this time. Were discharge instructions available to patient? Yes. Reviewed appropriate site of care based on symptoms and resources available to patient including: PCP, Urgent care clinics and When to call 911. The patient agrees to contact the PCP office for questions related to their healthcare. Medication reconciliation was performed with patient, who verbalizes understanding of administration of home medications. Advised obtaining a 90-day supply of all daily and as-needed medications. Reviewed and educated patient on any new and changed medications related to discharge diagnosis. Was patient discharged with a pulse oximeter? no     CTN provided contact information. Plan for follow-up call in 5-7 days based on severity of symptoms and risk factors. Plan for next call: symptom management-., self management-. and follow up appointment-.           Non-face-to-face services provided:  Obtained and reviewed discharge summary and/or continuity of care documents    Care Transitions 24 Hour Call    Do you have any ongoing symptoms?: No  Do you have a copy of your discharge instructions?: Yes  Do you have all of your prescriptions and are they filled?: Yes  Have you been contacted by a [address]?: No  Have you scheduled your follow up appointment?: No  Were you discharged with any Home Care or Post Acute Services: No  Do you feel like you have everything you need to keep you well at home?: Yes  Care Transitions Interventions         Follow Up  Future Appointments   Date Time Provider Darryl Jacome   8/23/2021  4:00 PM MD DUSTIN Nugent [place] - [facility]   8/31/2021  1:00 PM MD Dave Dixon   9/15/2021  1:15 PM MD Dhaval Hooks [facility]       Kwame Brasher RN

## 2021-08-23 ENCOUNTER — OFFICE VISIT (OUTPATIENT)
Dept: PRIMARY CARE CLINIC | Age: 71
End: 2021-08-23
Payer: MEDICARE

## 2021-08-23 VITALS
WEIGHT: 273 LBS | HEART RATE: 81 BPM | OXYGEN SATURATION: 99 % | TEMPERATURE: 97.7 F | RESPIRATION RATE: 14 BRPM | DIASTOLIC BLOOD PRESSURE: 80 MMHG | SYSTOLIC BLOOD PRESSURE: 126 MMHG | BODY MASS INDEX: 36.02 KG/M2

## 2021-08-23 DIAGNOSIS — Z01.818 PRE-OP EVALUATION: Primary | ICD-10-CM

## 2021-08-23 DIAGNOSIS — E66.01 MORBIDLY OBESE (HCC): ICD-10-CM

## 2021-08-23 DIAGNOSIS — C18.6 MALIGNANT NEOPLASM OF DESCENDING COLON (HCC): Chronic | ICD-10-CM

## 2021-08-23 DIAGNOSIS — I10 HTN (HYPERTENSION), BENIGN: Chronic | ICD-10-CM

## 2021-08-23 DIAGNOSIS — E78.5 HYPERLIPIDEMIA WITH TARGET LDL LESS THAN 70: Chronic | ICD-10-CM

## 2021-08-23 DIAGNOSIS — I25.10 CORONARY ARTERY DISEASE INVOLVING NATIVE CORONARY ARTERY OF NATIVE HEART WITHOUT ANGINA PECTORIS: Chronic | ICD-10-CM

## 2021-08-23 DIAGNOSIS — E11.21 DIABETES MELLITUS WITH MICROALBUMINURIC DIABETIC NEPHROPATHY (HCC): Chronic | ICD-10-CM

## 2021-08-23 PROBLEM — K57.30 DIVERTICULOSIS OF LARGE INTESTINE WITHOUT HEMORRHAGE: Status: RESOLVED | Noted: 2018-08-31 | Resolved: 2021-08-23

## 2021-08-23 PROBLEM — K92.2 ACUTE LOWER GI HEMORRHAGE: Status: RESOLVED | Noted: 2021-08-14 | Resolved: 2021-08-23

## 2021-08-23 PROCEDURE — 1111F DSCHRG MED/CURRENT MED MERGE: CPT | Performed by: INTERNAL MEDICINE

## 2021-08-23 PROCEDURE — 1123F ACP DISCUSS/DSCN MKR DOCD: CPT | Performed by: INTERNAL MEDICINE

## 2021-08-23 PROCEDURE — 4040F PNEUMOC VAC/ADMIN/RCVD: CPT | Performed by: INTERNAL MEDICINE

## 2021-08-23 PROCEDURE — 99215 OFFICE O/P EST HI 40 MIN: CPT | Performed by: INTERNAL MEDICINE

## 2021-08-23 PROCEDURE — G8427 DOCREV CUR MEDS BY ELIG CLIN: HCPCS | Performed by: INTERNAL MEDICINE

## 2021-08-23 PROCEDURE — 3044F HG A1C LEVEL LT 7.0%: CPT | Performed by: INTERNAL MEDICINE

## 2021-08-23 PROCEDURE — 2022F DILAT RTA XM EVC RTNOPTHY: CPT | Performed by: INTERNAL MEDICINE

## 2021-08-23 PROCEDURE — G8417 CALC BMI ABV UP PARAM F/U: HCPCS | Performed by: INTERNAL MEDICINE

## 2021-08-23 PROCEDURE — 93000 ELECTROCARDIOGRAM COMPLETE: CPT | Performed by: INTERNAL MEDICINE

## 2021-08-23 PROCEDURE — 3017F COLORECTAL CA SCREEN DOC REV: CPT | Performed by: INTERNAL MEDICINE

## 2021-08-23 PROCEDURE — 1036F TOBACCO NON-USER: CPT | Performed by: INTERNAL MEDICINE

## 2021-08-23 NOTE — PROGRESS NOTES
Subjective:        Reason for visit. Colton Cheema is a 70 y.o. male who presents for a preoperative physical examination. He is scheduled to have lap colectomy   done by Dr. Tata Cantrell at [facility].  on 9/3/21. History of Present Illness:      Here for a pre op eval for the above surgery,  Malignant neoplasm of descending colon ([address]  Here for a pre op eval for LAPAROSCOPIC LEFT COLECTOMY, POSSIBLE OPEN. He is medically stable. HTN (hypertension), benign  This is a chronic problem. The problem is well controlled. Patient monitors readings regularly. Pertinent negatives include no chest pain, focal sensory loss, focal weakness, leg pain, myalgias or shortness of breath. No headaches or chest pain. Takes medications regularly. Blood pressure has been stable, blood work was reviewed, and advised patient to continue the current instructions or medications. Coronary artery disease involving native coronary artery of native heart without angina pectoris  No cp or sob,  stable on meds. Patient is compliant w medications, no side effects, effective, provides adequate symptom relief. No new symptoms or problems as noted by patient. The problem is stable, no changes noted by patient. Will consider monitoring labs and refill medications as appropriate. Patient counseled and will continue current plan. Hyperlipidemia with target LDL less than 70  This has been a long standing problem, takes lipitor      Monitors diet and tries to follow a low fat diet. Has  been reasonably  compliant w exercise. Lipids have been stable, The problem is controlled. Recent lipid tests were reviewed and are normal. Pertinent negatives include no chest pain, focal sensory loss, focal weakness, leg pain, myalgias or shortness of breath. Advised patient to continue the current instructions or medications.        Diabetes mellitus with microalbuminuric diabetic nephropathy ([address]  This problem is stable, reviewed in COLONOSCOPY SUBMUCOSAL SPOT INJECTION performed by Gabriela Perez MD at [place] [address]  7/12/2004    2 stents in RCA    LEG SURGERY Left ~2001    [facility]  Lat calf & lat     NASAL HEMORRHAGE CONTROL  1/1/2012                                                   Current Outpatient Medications   Medication Sig Dispense Refill    hydrALAZINE (APRESOLINE) 25 MG tablet Take 1 tablet by mouth every 8 hours 90 tablet 3    metroNIDAZOLE (FLAGYL) 500 MG tablet Take one tablet by mouth 3 times on the day prior to surgery. Take one tablet at 1pm, 2pm and 9pm. 3 tablet 0    carvedilol (COREG) 25 MG tablet TAKE 1 TABLET BY MOUTH TWICE A DAY 60 tablet 0    atorvastatin (LIPITOR) 20 MG tablet TAKE 1 TABLET BY MOUTH EVERY DAY 30 tablet 0    amLODIPine (NORVASC) 10 MG tablet TAKE 1 TABLET BY MOUTH EVERY DAY 30 tablet 0    doxazosin (CARDURA) 8 MG tablet TAKE 1 TABLET BY MOUTH EVERY NIGHT 90 tablet 2    cloNIDine (CATAPRES) 0.1 MG tablet TAKE 1 TABLET BY MOUTH TWICE A  tablet 1    valsartan-hydrochlorothiazide (DIOVAN-HCT) 320-12.5 MG per tablet TAKE 1 TABLET BY MOUTH DAILY 90 tablet 3    sildenafil (VIAGRA) 50 MG tablet Take 1 tablet by mouth daily as needed for Erectile Dysfunction 10 tablet 3    ONETOUCH DELICA LANCETS 57R MISC Test blood sugar twice daily. 3    travoprost, benzalkonium, (TRAVATAN) 0.004 % ophthalmic solution Place 1 drop into both eyes nightly.  glucose blood VI test strips (ASCENSIA AUTODISC VI;ONE TOUCH ULTRA TEST VI) strip 1 each by In Vitro route 2 times daily. As needed. 100 each 3    Blood Glucose Monitoring Suppl (BLOOD GLUCOSE METER) KIT 1 Device by Does not apply route 2 times daily. 1 kit 0    aspirin 81 MG tablet Take 81 mg by mouth daily.  therapeutic multivitamin-minerals (THERAGRAN-M) tablet Take 1 tablet by mouth daily.  neomycin (MYCIFRADIN) 500 MG tablet Take two tablets 3 times the day before surgery.  Take two tablets at 1pm, two tablets at 2pm and two tablets at 9pm. (Patient not taking: Reported on 2021) 6 tablet 0     No current facility-administered medications for this visit. No Known Allergies    Social History     Tobacco Use    Smoking status: Former Smoker     Packs/day: 0.00     Years: 30.00     Pack years: 0.00     Types: Pipe     Quit date: 1976     Years since quittin.6    Smokeless tobacco: Never Used    Tobacco comment: 30-33 year 2 bowls /day   Vaping Use    Vaping Use: Never used   Substance Use Topics    Alcohol use: Yes     Alcohol/week: 0.0 standard drinks     Comment: Wine occasionally ie wedding/business event. Was drinking mid 34's 9-8 scotch 1 yr.  Drug use: No     Comment: Tried MJ / hash. Family History   Problem Relation Age of Onset    High Blood Pressure Mother     High Blood Pressure Father     Lung Cancer Father         Smoker    Stroke Brother         Brain aneurysm    Hypertension Brother     Other Brother         Prostate    No Known Problems Son         Review Of Systems    Skin: no abnormal pigmentation, rash, scaling, itching, masses, hair or nail changes  Eyes: negative  Ears/Nose/Throat: negative  Respiratory: negative  Cardiovascular: negative  Gastrointestinal: negative  Genitourinary: negative  Musculoskeletal: negative  Neurologic: negative  Psychiatric: negative  Hematologic/Lymphatic/Immunologic: negative  Endocrine: negative       Objective:      /80 (Site: Right Upper Arm, Position: Sitting, Cuff Size: Medium Adult)   Pulse 81   Temp 97.7 °F (36.5 °C)   Resp 14   Wt 273 lb (123.8 kg)   SpO2 99%   BMI 36.02 kg/m²   General appearance - healthy, alert, no distress  Skin - Skin color, texture, turgor normal. No rashes or lesions. Head - Normocephalic. No masses, lesions, tenderness or abnormalities  Eyes - conjunctivae/corneas clear. PERRL, EOM's intact. Ears - External ears normal. Canals clear.  TM's normal.  Nose/Sinuses - Nares normal. Septum midline. Mucosa normal. No drainage or sinus tenderness. Oropharynx - Lips, mucosa, and tongue normal. Teeth and gums normal. Oropharynx pink and patent  Neck - Neck supple. No adenopathy. Thyroid symmetric, normal size,  Back - Back symmetric, no curvature. ROM normal. No CVA tenderness. Lungs - Percussion normal. Good diaphragmatic excursion. Lungs clear  Heart - Regular rate and rhythm, with no rub, murmur or gallop noted. Abdomen - Abdomen soft, non-tender. BS normal. No masses, organomegaly  Extremities - Extremities normal. No deformities, edema, or skin discolora  Musculoskeletal - Spine ROM normal. Muscular strength intact. Peripheral pulses - radial=2+,, femoral=2+, popliteal=2+, dorsalis pedis=2+,  Neuro - Gait normal. Reflexes normal and symmetric. Sensation grossly normal.  No focal weakness    EKG: NSR, non specific T waves, no acute ischemia. Jorge Luis Duet for surgery. BLOOD WORK: done. Assessment:      Pre op eval.  Malignant neoplasm of descending colon (HCC)  Here for a pre op eval for LAPAROSCOPIC LEFT COLECTOMY, POSSIBLE OPEN. He is medically stable. HTN (hypertension), benign  This is a chronic problem. The problem is well controlled. Patient monitors readings regularly. Pertinent negatives include no chest pain, focal sensory loss, focal weakness, leg pain, myalgias or shortness of breath. No headaches or chest pain. Takes medications regularly. Blood pressure has been stable, blood work was reviewed, and advised patient to continue the current instructions or medications. Coronary artery disease involving native coronary artery of native heart without angina pectoris  No cp or sob,  stable on meds. Patient is compliant w medications, no side effects, effective, provides adequate symptom relief. No new symptoms or problems as noted by patient. The problem is stable, no changes noted by patient.  Will consider monitoring labs and refill medications as appropriate. Patient counseled and will continue current plan. Hyperlipidemia with target LDL less than 70  This has been a long standing problem, takes lipitor      Monitors diet and tries to follow a low fat diet. Has  been reasonably  compliant w exercise. Lipids have been stable, The problem is controlled. Recent lipid tests were reviewed and are normal. Pertinent negatives include no chest pain, focal sensory loss, focal weakness, leg pain, myalgias or shortness of breath. Advised patient to continue the current instructions or medications. Diabetes mellitus with microalbuminuric diabetic nephropathy ([facility] [address]  This problem is stable, reviewed in detail, and advised patient to continue the current instructions or medications. Will continue to closely monitor the situation. Morbidly obese ([facility] [address]  Patient counseled,   Encouraged to lose weight, watch diet and exercise consistently. Plan:        He is medically cleared for surgery and anesthesia. Per operating surgeon. See also orders filed with this encounter, if any. Instructions to patient: Do not take any NSAIDS 1 week prior to surgery.   Indication for alma rosa-operative beta blocker therapy: N/A      Chele Tafoya MD   8/23/2021 3:57 PM

## 2021-08-23 NOTE — ASSESSMENT & PLAN NOTE
No cp or sob,  stable on meds. Patient is compliant w medications, no side effects, effective, provides adequate symptom relief. No new symptoms or problems as noted by patient. The problem is stable, no changes noted by patient. Will consider monitoring labs and refill medications as appropriate. Patient counseled and will continue current plan.

## 2021-08-25 ENCOUNTER — CARE COORDINATION (OUTPATIENT)
Dept: CASE MANAGEMENT | Age: 71
End: 2021-08-25

## 2021-08-25 NOTE — CARE COORDINATION
[facility] Transitions Follow Up Call    2021    Patient: Charanjit Butts  Patient : 1950   MRN: [id number]  Reason for Admission: Acute lower GI hemorrhage; CA  Discharge Date: 21 RARS: Readmission Risk Score: 11         Spoke with: Dale APARICIO [address] Transitions Subsequent and Final Call    Subsequent and Final Calls  Do you have any ongoing symptoms?: No  Have your medications changed?: No  Do you have any questions related to your medications?: No  Do you currently have any active services?: No  Do you have any needs or concerns that I can assist you with?: No  Identified Barriers: None  Care Transitions Interventions  Other Interventions: Follow Up: Patient reports that he is doing well, having normal BM's without blood. He is scheduled for a colectomy 9/3/21, was inquiring about pre-op medications and whether he should take his maintenance meds the morning of surgery. CTN explained that he would receive a PAT call prior to surgery or he could contact surgeon's office for directions, he verbalized understanding. He followed up with PCP 21. CTN will continue with outreach follow up calls.     Future Appointments   Date Time Provider Darryl Jacome   2021  1:00 PM Sada Velasco MD [facility] ROMAN   9/15/2021  1:15 PM Beata Vogel MD [facility] JESSIE Rubin RN

## 2021-08-31 ENCOUNTER — OFFICE VISIT (OUTPATIENT)
Dept: CARDIOLOGY CLINIC | Age: 71
End: 2021-08-31
Payer: MEDICARE

## 2021-08-31 VITALS
DIASTOLIC BLOOD PRESSURE: 64 MMHG | BODY MASS INDEX: 37.11 KG/M2 | HEIGHT: 73 IN | SYSTOLIC BLOOD PRESSURE: 144 MMHG | WEIGHT: 280 LBS | OXYGEN SATURATION: 97 % | HEART RATE: 82 BPM

## 2021-08-31 DIAGNOSIS — E78.5 HYPERLIPIDEMIA WITH TARGET LDL LESS THAN 70: Chronic | ICD-10-CM

## 2021-08-31 DIAGNOSIS — I10 HTN (HYPERTENSION), BENIGN: Chronic | ICD-10-CM

## 2021-08-31 DIAGNOSIS — I25.10 CORONARY ARTERY DISEASE INVOLVING NATIVE CORONARY ARTERY OF NATIVE HEART WITHOUT ANGINA PECTORIS: Primary | Chronic | ICD-10-CM

## 2021-08-31 PROCEDURE — 1036F TOBACCO NON-USER: CPT | Performed by: INTERNAL MEDICINE

## 2021-08-31 PROCEDURE — 1123F ACP DISCUSS/DSCN MKR DOCD: CPT | Performed by: INTERNAL MEDICINE

## 2021-08-31 PROCEDURE — G8428 CUR MEDS NOT DOCUMENT: HCPCS | Performed by: INTERNAL MEDICINE

## 2021-08-31 PROCEDURE — G8417 CALC BMI ABV UP PARAM F/U: HCPCS | Performed by: INTERNAL MEDICINE

## 2021-08-31 PROCEDURE — 4040F PNEUMOC VAC/ADMIN/RCVD: CPT | Performed by: INTERNAL MEDICINE

## 2021-08-31 PROCEDURE — 99214 OFFICE O/P EST MOD 30 MIN: CPT | Performed by: INTERNAL MEDICINE

## 2021-08-31 PROCEDURE — 1111F DSCHRG MED/CURRENT MED MERGE: CPT | Performed by: INTERNAL MEDICINE

## 2021-08-31 PROCEDURE — 3017F COLORECTAL CA SCREEN DOC REV: CPT | Performed by: INTERNAL MEDICINE

## 2021-08-31 RX ORDER — VALSARTAN AND HYDROCHLOROTHIAZIDE 320; 12.5 MG/1; MG/1
1 TABLET, FILM COATED ORAL DAILY
Qty: 90 TABLET | Refills: 3 | Status: SHIPPED | OUTPATIENT
Start: 2021-08-31 | End: 2022-10-04

## 2021-08-31 RX ORDER — AMLODIPINE BESYLATE 10 MG/1
TABLET ORAL
Qty: 30 TABLET | Refills: 6 | Status: SHIPPED | OUTPATIENT
Start: 2021-08-31 | End: 2022-03-02 | Stop reason: SDUPTHER

## 2021-08-31 RX ORDER — ATORVASTATIN CALCIUM 20 MG/1
TABLET, FILM COATED ORAL
Qty: 30 TABLET | Refills: 6 | Status: SHIPPED | OUTPATIENT
Start: 2021-08-31 | End: 2022-03-03 | Stop reason: SDUPTHER

## 2021-08-31 RX ORDER — CARVEDILOL 25 MG/1
TABLET ORAL
Qty: 60 TABLET | Refills: 6 | Status: SHIPPED | OUTPATIENT
Start: 2021-08-31 | End: 2022-03-03 | Stop reason: SDUPTHER

## 2021-08-31 RX ORDER — CLONIDINE HYDROCHLORIDE 0.1 MG/1
TABLET ORAL
Qty: 30 TABLET | Refills: 6 | Status: SHIPPED | OUTPATIENT
Start: 2021-08-31 | End: 2021-11-15 | Stop reason: SDUPTHER

## 2021-08-31 RX ORDER — DOXAZOSIN 8 MG/1
TABLET ORAL
Qty: 90 TABLET | Refills: 3 | Status: SHIPPED | OUTPATIENT
Start: 2021-08-31 | End: 2022-04-11

## 2021-08-31 NOTE — PROGRESS NOTES
[facility]   Cardiac Evaluation      Patient: Radha Howard  YOB: 1950  Date: 8/31/21       Chief Complaint   Patient presents with    Coronary Artery Disease    Hyperlipidemia    Hypertension        Referring provider: Vincenzo Lyle MD    History of Present Illness:   Mr Peace Vo is seen today in follow up. He has a h/o CAD, HTN, ANTONIETTA, and hyperlipidemia. 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 Mr Peace Vo has been diagnosed with adenocarcinoma of the colon. He is scheduled to have a left colectomy 9/3/21 with Dr Praful Rinaldi. He does not have any detectable metastasis. Mr Peace Vo denies any chest pain, GENTILE, palpitations, dizziness, or edema. Tim walks for exercise and is staying active. Past Medical History:   has a past medical history of CAD (coronary artery disease), Coronary artery disease involving native coronary artery of native heart without angina pectoris, Diabetes mellitus with microalbuminuric diabetic nephropathy ([address] Diverticulosis of colon, DM type 2 (diabetes mellitus, type 2) ([address] ED (erectile dysfunction), Epistaxis, Homocysteinemia ([address] HTN (hypertension), benign, Hyperlipidemia LDL goal < 70, Low testosterone, MI (myocardial infarction) ([address] Neurofibromatosis ([address] ANTONIETTA (obstructive sleep apnea), PSA elevation, Sensorineural hearing loss, bilateral, and Vitamin D deficiency. Surgical History:   has a past surgical history that includes Coronary angioplasty with stent (7/12/2004); nasal hemorrhage control (1/1/2012); Leg Surgery (Left, ~2001); Cardiac surgery (2004); Colonoscopy (01/01/2006); Colonoscopy (N/A, 08/31/2018); Colonoscopy (N/A, 8/16/2021); Colonoscopy (8/16/2021); and Colonoscopy (8/16/2021).      Current Outpatient Medications   Medication Sig Dispense Refill    hydrALAZINE (APRESOLINE) 25 MG tablet Take 1 tablet by mouth every 8 hours 90 tablet 3    carvedilol (COREG) 25 MG tablet TAKE 1 TABLET BY MOUTH TWICE A DAY 60 tablet 0    atorvastatin (LIPITOR) 20 MG tablet TAKE 1 TABLET BY MOUTH EVERY DAY 30 tablet 0    amLODIPine (NORVASC) 10 MG tablet TAKE 1 TABLET BY MOUTH EVERY DAY 30 tablet 0    doxazosin (CARDURA) 8 MG tablet TAKE 1 TABLET BY MOUTH EVERY NIGHT 90 tablet 2    cloNIDine (CATAPRES) 0.1 MG tablet TAKE 1 TABLET BY MOUTH TWICE A  tablet 1    valsartan-hydrochlorothiazide (DIOVAN-HCT) 320-12.5 MG per tablet TAKE 1 TABLET BY MOUTH DAILY 90 tablet 3    travoprost, benzalkonium, (TRAVATAN) 0.004 % ophthalmic solution Place 1 drop into both eyes nightly.  aspirin 81 MG tablet Take 81 mg by mouth daily.  metroNIDAZOLE (FLAGYL) 500 MG tablet Take one tablet by mouth 3 times on the day prior to surgery. Take one tablet at 1pm, 2pm and 9pm. 3 tablet 0    neomycin (MYCIFRADIN) 500 MG tablet Take two tablets 3 times the day before surgery. Take two tablets at 1pm, two tablets at 2pm and two tablets at 9pm. (Patient not taking: Reported on 8/23/2021) 6 tablet 0    sildenafil (VIAGRA) 50 MG tablet Take 1 tablet by mouth daily as needed for Erectile Dysfunction 10 tablet 3    ONETOUCH DELICA LANCETS 65J MISC Test blood sugar twice daily. 3    glucose blood VI test strips (ASCENSIA AUTODISC VI;ONE TOUCH ULTRA TEST VI) strip 1 each by In Vitro route 2 times daily. As needed. 100 each 3    Blood Glucose Monitoring Suppl (BLOOD GLUCOSE METER) KIT 1 Device by Does not apply route 2 times daily. 1 kit 0    therapeutic multivitamin-minerals (THERAGRAN-M) tablet Take 1 tablet by mouth daily. No current facility-administered medications for this visit.        Social History:  Social History     Social History    Marital status:      Spouse name: Sosa Mccarty Number of children: 2    Years of education: 25     Occupational History    semi-retired       outside consulting     Social History Main Topics    Smoking status: Former Smoker     Types: Pipe     Quit date: 1/1/1976   [facility] Smokeless tobacco: Never Used      Comment: 30-33 year 2 bowls /day    Alcohol use 0.0 oz/week      Comment: Wine occasionally ie wedding/business event. Was drinking mid 34's 9-8 scotch 1 yr.  Drug use: No      Comment: Tried MJ / hash.  Sexual activity: Not on file     Other Topics Concern    From [facility] [place] and appreciates [place]. Social History Narrative    Working 25-30 hours. [employer]000 steps. [employer] 3-4x/wk 75 min. 9/29/17. Family History:  family history includes High Blood Pressure in his father and mother; Hypertension in his brother; Dana César in his father; No Known Problems in his son; Other in his brother; Stroke in his brother. Allergies:  Patient has no known allergies. Review of Systems:   · Constitutional: there has been no unanticipated weight loss. No change in energy or activity level   · Eyes: No visual changes   · ENT: No Headaches, hearing loss or vertigo. No mouth sores or sore throat. · Cardiovascular: Reviewed in HPI  · Respiratory: No cough or wheezing, no sputum production. · Gastrointestinal: No abdominal pain, appetite loss, blood in stools. No change in bowel or bladder habits. · Genitourinary: No nocturia, dysuria, trouble voiding  · Musculoskeletal:  No gait disturbance, weakness or joint complaints. · Integumentary: No rash or pruritis. · Neurological: No headache, change in muscle strength, numbness or tingling. No change in gait, balance, coordination, mood, affect, memory, mentation, behavior. · Psychiatric: No anxiety or depression  · Endocrine: No malaise or fever  · Hematologic/Lymphatic: No abnormal bruising or bleeding, blood clots or swollen lymph nodes. · Allergic/Immunologic: No nasal congestion or hives.     Physical Examination:    Vitals:    08/31/21 1311 08/31/21 1316   BP: (!) 150/64 (!) 144/64   Site: Left Upper Arm Left Upper Arm   Position: Sitting Sitting   Cuff Size: Large Adult Large Adult   Pulse: 82 SpO2: 97%    Weight: 280 lb (127 kg)    Height: 6' 1\" (1.854 m)      Body mass index is 36.94 kg/m². Wt Readings from Last 3 Encounters:   08/31/21 280 lb (127 kg)   08/23/21 273 lb (123.8 kg)   08/18/21 283 lb (128.4 kg)      BP Readings from Last 3 Encounters:   08/31/21 (!) 144/64   08/23/21 126/80   08/18/21 (!) 142/48      Constitutional and General Appearance:  appears stated age,   Eyes - no xanthelasma  Respiratory:  · Normal excursion and expansion without use of accessory muscles  · Resp Auscultation: Normal breath sounds without dullness  Cardiovascular:  · The apical impulses not displaced  · Heart is regular rate and rhythm with normal S1, S2  · PMI is normal  · The carotid upstroke is normal, no bruit noted   · JVP is not elevated  · Peripheral pulses are symmetrical  · There is no clubbing, cyanosis of the extremities  · No edema BLE  · No varicosities  · Femoral Arteries: 2+ and equal without bruits  · Pedal Pulses: 2+ and equal   Abdomen:  · No masses or tenderness  · Aorta not palpable  · Normal bowel sounds  Neurological/Psychiatric:  · Alert and oriented x3  · Moves all extremities well  · Exhibits normal gait balance and coordination      Assessment/Plan  1. Coronary artery disease involving native coronary artery of native heart without angina pectoris -stable  Stress echo 2/17: Normal augmentation of all segments. EF 60%. No regional wall motion abnormalities are seen. moderate concentric left ventricular hypertrophy. E/e\"= 14. Mild mitral regurgitation. Aortic valve appears sclerotic but opens adequately. No stenosis. Mild aortic regurgitation is present. Pressure half-time is calculated at 613 msec. Mild tricuspid regurgitation with RVSP estimated at 28 mmHg  2004 cath-- Taxus stents distal/prox RCA; OM disease    2. HTN (hypertension), benign -  Controlled here, has not been checking at home   3. Hyperlipidemia - stable on labs 5/21/21: ; TRIG 78;  HDL 46; LDL 79, lipitor 20mg daily         PLAN: Continue present meds. I will see him is 6 weeks after his colon resection. He is stable from a cardiac standpoint for colon resection. Scribe's attestation: This note was scribed in the presence of Dr Rudine Nyhan MD by Monse Echavarria RN. The scribe's documentation has been prepared under my direction and personally reviewed by me in its entirety. I confirm that the note above accurately reflects all work, treatment, procedures, and medical decision making performed by me. Thank you for allowing to me to participate in the care of Carlos Becerra.

## 2021-09-01 ENCOUNTER — CARE COORDINATION (OUTPATIENT)
Dept: CASE MANAGEMENT | Age: 71
End: 2021-09-01

## 2021-09-01 NOTE — PROGRESS NOTES
[facility] patients having surgery or anesthesia are required to be Covid tested OR to have been vaccinated at least 14 days prior to your procedure. It is very important to return our call to [phone] and notify the staff of your last vaccination date otherwise you will be required to complete Covid PCR test within the 5-6 days prior to surgery & quarantine. The results will need to be faxed to PreAdmission Testing at [phone]. PRIOR TO PROCEDURE DATE:        1. PLEASE FOLLOW ANY  GUIDELINES/ INSTRUCTIONS PRIOR TO YOUR PROCEDURE AS ADVISED BY YOUR SURGEON. 2. Arrange for someone to drive you home and be with you for the first 24 hours after discharge for your safety after your procedure for which you received sedation. Ensure it is someone we can share information with regarding your discharge. 3. You must contact your surgeon for instructions IF:   You are taking any blood thinners, aspirin, anti-inflammatory or vitamin E.   There is a change in your physical condition such as a cold, fever, rash, cuts, sores or any other infection, especially near your surgical site. 4. Do not drink alcohol the day before or day of your procedure. 5. A Pre-op History and Physical for surgery MUST be completed by your Physician or Urgent Care within 30 days of your procedure date. Please bring a copy with you on the day of your procedure and along with any other testing performed. THE DAY OF YOUR PROCEDURE:  1. Follow instructions for ARRIVAL TIME as DIRECTED BY YOUR SURGEON. 2. Enter the MAIN entrance from [address] [place] [address] and follow the signs to the free [employer] or [employer] parking (offered free of charge 6am-5pm). 3. Enter the Main Entrance of the hospital (do not enter from the lower level of the parking garage). Upon entrance, check in with the  at the main desk on your left. to protect them while you are in surgery. 10. If you use a CPAP, please bring it with you on the day of your procedure. 11. We recommend that valuable personal  belongings such as cash, cell phones, e-tablets or jewelry, be left at home during your stay. The hospital will not be responsible for valuables that are not secured in the hospital safe. However, if your insurance requires a co-pay, you may want to bring a method of payment, i.e. Check or credit card, if you wish to pay your co-pay the day of surgery. 12. If you are to stay overnight, you may bring a bag with personal items. Please have any large items you may need brought in by your family after your arrival to your hospital room. 15. If you have a Living Will or Durable Power of , please bring a copy on the day of your procedure. 15. With your permission, one family member may accompany you while you are being prepared for surgery. Once you are ready, additional family members may join you. HOW WE KEEP YOU SAFE and WORK TO PREVENT SURGICAL SITE INFECTIONS:  1. Health care workers should always check your ID bracelet to verify your name and birth date. You will be asked many times to state your name, date of birth, and allergies. 2. Health care workers should always clean their hands with soap or alcohol gel before providing care to you. It is okay to ask anyone if they cleaned their hands before they touch you. 3. You will be actively involved in verifying the type of procedure you are having and ensuring the correct surgical site. This will be confirmed multiple times prior to your procedure. Do NOT sydnie your surgery site UNLESS instructed to by your surgeon. 4. Do not shave or wax for 72 hours prior to procedure near your operative site. Shaving with a razor can irritate your skin and make it easier to develop an infection.  On the day of your procedure, any hair that needs to be removed near the surgical site will be clipped by a healthcare worker using a special clippers designed to avoid skin irritation. 5. When you are in the operating room, your surgical site will be cleansed with a special soap, and in most cases, you will be given an antibiotic before the surgery begins. What to expect AFTER YOUR PROCEDURE:  1. Immediately following your procedure, your will be taken to the PACU for the first phase of your recovery. Your nurse will help you recover from any potential side effects of anesthesia, such as extreme drowsiness, changes in your vital signs or breathing patterns. Nausea, headache, muscle aches, or sore throat may also occur after anesthesia. Your nurse will help you manage these potential side effects. 2. For comfort and safety, arrange to have someone at home with you for the first 24 hours after discharge. 3. You and your family will be given written instructions about your diet, activity, dressing care, medications, and return visits. 4. Once at home, should issues with nausea, pain, or bleeding occur, or should you notice any signs of infection, you should call your surgeon. 5. Always clean your hands before and after caring for your wound. Do not let your family touch your surgery site without cleaning their hands. 6. Narcotic pain medications can cause significant constipation. You may want to add a stool softener to your postoperative medication schedule or speak to your surgeon on how best to manage this SIDE EFFECT. SPECIAL INSTRUCTIONS   Thank you for allowing us to care for you. We strive to exceed your expectations in the delivery of care and service provided to you and your family. If you need to contact the [place] [address] staff for any reason, please call us at [phone]    Instructions reviewed with patient during preadmission testing phone interview.   Louise Carbone RN.9/1/2021 .7:52 AM      ADDITIONAL EDUCATIONAL INFORMATION REVIEWED PER PHONE WITH YOU AND/OR YOUR FAMILY:    Yes Antibacterial Soap

## 2021-09-02 ENCOUNTER — ANESTHESIA EVENT (OUTPATIENT)
Dept: OPERATING ROOM | Age: 71
DRG: 330 | End: 2021-09-02
Payer: MEDICARE

## 2021-09-02 ENCOUNTER — TELEPHONE (OUTPATIENT)
Dept: SURGERY | Age: 71
End: 2021-09-02

## 2021-09-02 ENCOUNTER — CARE COORDINATION (OUTPATIENT)
Dept: CASE MANAGEMENT | Age: 71
End: 2021-09-02

## 2021-09-02 NOTE — TELEPHONE ENCOUNTER
I have placed a reminder call to patient for upcoming procedure. Did you speak directly to patient or leave a voicemail? Spoke to patient    Follow clear liquids the day before procedure.   Start bowel prep at 12:00pm  Take antibiotics    Arrive at the main entrance [facility] at 6:45AM

## 2021-09-02 NOTE — CARE COORDINATION
[facility] Transitions Follow Up Call    2021    Patient: Prachi Johnson  Patient : 1950   MRN: [id number]  Reason for Admission: Acute lower GI hemorrhage; CA  Discharge Date: 21 RARS: Readmission Risk Score: 11         Spoke with: Dale S Amena St Transitions Subsequent and Final Call    Subsequent and Final Calls  Do you have any ongoing symptoms?: No  Have your medications changed?: No  Do you have any questions related to your medications?: No  Do you currently have any active services?: No  Do you have any needs or concerns that I can assist you with?: No  Identified Barriers: None  Care Transitions Interventions  Other Interventions: Follow Up: Patient is scheduled for surgery tomorrow 9/3/21. He is doing bowel preps as instructed and denies any questions or concerns at this time. CTN will monitor for discharge and follow up as indicated.   Future Appointments   Date Time Provider Darryl Jacome   9/15/2021  1:15 PM MD Kathleen Baker RN
no

## 2021-09-03 ENCOUNTER — HOSPITAL ENCOUNTER (INPATIENT)
Age: 71
LOS: 2 days | Discharge: HOME | DRG: 330 | End: 2021-09-05
Attending: SURGERY | Admitting: SURGERY
Payer: MEDICARE

## 2021-09-03 ENCOUNTER — ANESTHESIA (OUTPATIENT)
Dept: OPERATING ROOM | Age: 71
DRG: 330 | End: 2021-09-03
Payer: MEDICARE

## 2021-09-03 VITALS — DIASTOLIC BLOOD PRESSURE: 74 MMHG | OXYGEN SATURATION: 100 % | SYSTOLIC BLOOD PRESSURE: 138 MMHG | TEMPERATURE: 97.2 F

## 2021-09-03 DIAGNOSIS — C18.6 CANCER OF DESCENDING COLON (HCC): ICD-10-CM

## 2021-09-03 DIAGNOSIS — C18.9 ADENOCARCINOMA OF COLON (HCC): Primary | ICD-10-CM

## 2021-09-03 LAB
ABO/RH: NORMAL
ANION GAP SERPL CALCULATED.3IONS-SCNC: 12 MMOL/L (ref 3–16)
ANTIBODY SCREEN: NORMAL
BUN BLDV-MCNC: 8 MG/DL (ref 7–20)
CALCIUM SERPL-MCNC: 9.2 MG/DL (ref 8.3–10.6)
CHLORIDE BLD-SCNC: 103 MMOL/L (ref 99–110)
CO2: 25 MMOL/L (ref 21–32)
CREAT SERPL-MCNC: 1.2 MG/DL (ref 0.8–1.3)
GFR AFRICAN AMERICAN: >60
GFR NON-AFRICAN AMERICAN: 60
GLUCOSE BLD-MCNC: 107 MG/DL (ref 70–99)
GLUCOSE BLD-MCNC: 144 MG/DL (ref 70–99)
GLUCOSE BLD-MCNC: 151 MG/DL (ref 70–99)
GLUCOSE BLD-MCNC: 237 MG/DL (ref 70–99)
HCT VFR BLD CALC: 30.1 % (ref 40.5–52.5)
HEMOGLOBIN: 10 G/DL (ref 13.5–17.5)
MCH RBC QN AUTO: 26 PG (ref 26–34)
MCHC RBC AUTO-ENTMCNC: 33.3 G/DL (ref 31–36)
MCV RBC AUTO: 78.1 FL (ref 80–100)
PDW BLD-RTO: 15.6 % (ref 12.4–15.4)
PERFORMED ON: ABNORMAL
PLATELET # BLD: 244 K/UL (ref 135–450)
PMV BLD AUTO: 7.9 FL (ref 5–10.5)
POTASSIUM REFLEX MAGNESIUM: 3.7 MMOL/L (ref 3.5–5.1)
RBC # BLD: 3.86 M/UL (ref 4.2–5.9)
SODIUM BLD-SCNC: 140 MMOL/L (ref 136–145)
WBC # BLD: 3.8 K/UL (ref 4–11)

## 2021-09-03 PROCEDURE — 86850 RBC ANTIBODY SCREEN: CPT

## 2021-09-03 PROCEDURE — 86900 BLOOD TYPING SEROLOGIC ABO: CPT

## 2021-09-03 PROCEDURE — 6370000000 HC RX 637 (ALT 250 FOR IP): Performed by: SURGERY

## 2021-09-03 PROCEDURE — 2500000003 HC RX 250 WO HCPCS: Performed by: NURSE ANESTHETIST, CERTIFIED REGISTERED

## 2021-09-03 PROCEDURE — 85027 COMPLETE CBC AUTOMATED: CPT

## 2021-09-03 PROCEDURE — 88342 IMHCHEM/IMCYTCHM 1ST ANTB: CPT

## 2021-09-03 PROCEDURE — 44204 LAPARO PARTIAL COLECTOMY: CPT | Performed by: SURGERY

## 2021-09-03 PROCEDURE — 2580000003 HC RX 258: Performed by: SURGERY

## 2021-09-03 PROCEDURE — 7100000001 HC PACU RECOVERY - ADDTL 15 MIN: Performed by: SURGERY

## 2021-09-03 PROCEDURE — 3600000014 HC SURGERY LEVEL 4 ADDTL 15MIN: Performed by: SURGERY

## 2021-09-03 PROCEDURE — 3700000000 HC ANESTHESIA ATTENDED CARE: Performed by: SURGERY

## 2021-09-03 PROCEDURE — 0DBL4ZZ EXCISION OF TRANSVERSE COLON, PERCUTANEOUS ENDOSCOPIC APPROACH: ICD-10-PCS | Performed by: SURGERY

## 2021-09-03 PROCEDURE — 3600000004 HC SURGERY LEVEL 4 BASE: Performed by: SURGERY

## 2021-09-03 PROCEDURE — 6360000002 HC RX W HCPCS: Performed by: STUDENT IN AN ORGANIZED HEALTH CARE EDUCATION/TRAINING PROGRAM

## 2021-09-03 PROCEDURE — 7100000000 HC PACU RECOVERY - FIRST 15 MIN: Performed by: SURGERY

## 2021-09-03 PROCEDURE — 2709999900 HC NON-CHARGEABLE SUPPLY: Performed by: SURGERY

## 2021-09-03 PROCEDURE — 3700000001 HC ADD 15 MINUTES (ANESTHESIA): Performed by: SURGERY

## 2021-09-03 PROCEDURE — 6360000002 HC RX W HCPCS: Performed by: SURGERY

## 2021-09-03 PROCEDURE — 1200000000 HC SEMI PRIVATE

## 2021-09-03 PROCEDURE — 94150 VITAL CAPACITY TEST: CPT

## 2021-09-03 PROCEDURE — 80048 BASIC METABOLIC PNL TOTAL CA: CPT

## 2021-09-03 PROCEDURE — 2720000010 HC SURG SUPPLY STERILE: Performed by: SURGERY

## 2021-09-03 PROCEDURE — 86901 BLOOD TYPING SEROLOGIC RH(D): CPT

## 2021-09-03 PROCEDURE — 36415 COLL VENOUS BLD VENIPUNCTURE: CPT

## 2021-09-03 PROCEDURE — 6360000002 HC RX W HCPCS: Performed by: NURSE ANESTHETIST, CERTIFIED REGISTERED

## 2021-09-03 PROCEDURE — 2500000003 HC RX 250 WO HCPCS: Performed by: SURGERY

## 2021-09-03 PROCEDURE — 82378 CARCINOEMBRYONIC ANTIGEN: CPT

## 2021-09-03 PROCEDURE — 64488 TAP BLOCK BI INJECTION: CPT | Performed by: STUDENT IN AN ORGANIZED HEALTH CARE EDUCATION/TRAINING PROGRAM

## 2021-09-03 PROCEDURE — 2580000003 HC RX 258: Performed by: ANESTHESIOLOGY

## 2021-09-03 PROCEDURE — 88341 IMHCHEM/IMCYTCHM EA ADD ANTB: CPT

## 2021-09-03 PROCEDURE — 44213 LAP MOBIL SPLENIC FL ADD-ON: CPT | Performed by: SURGERY

## 2021-09-03 PROCEDURE — 88309 TISSUE EXAM BY PATHOLOGIST: CPT

## 2021-09-03 RX ORDER — MIDAZOLAM HYDROCHLORIDE 1 MG/ML
INJECTION INTRAMUSCULAR; INTRAVENOUS
Status: COMPLETED
Start: 2021-09-03 | End: 2021-09-03

## 2021-09-03 RX ORDER — ACETAMINOPHEN 500 MG
1000 TABLET ORAL EVERY 6 HOURS
Status: DISCONTINUED | OUTPATIENT
Start: 2021-09-03 | End: 2021-09-05 | Stop reason: HOSPADM

## 2021-09-03 RX ORDER — LIDOCAINE HCL/PF 100 MG/5ML
SYRINGE (ML) INJECTION PRN
Status: DISCONTINUED | OUTPATIENT
Start: 2021-09-03 | End: 2021-09-03 | Stop reason: SDUPTHER

## 2021-09-03 RX ORDER — ACETAMINOPHEN 500 MG
1000 TABLET ORAL ONCE
Status: COMPLETED | OUTPATIENT
Start: 2021-09-03 | End: 2021-09-03

## 2021-09-03 RX ORDER — CARVEDILOL 25 MG/1
25 TABLET ORAL 2 TIMES DAILY
Status: DISCONTINUED | OUTPATIENT
Start: 2021-09-03 | End: 2021-09-05 | Stop reason: HOSPADM

## 2021-09-03 RX ORDER — DIPHENHYDRAMINE HYDROCHLORIDE 50 MG/ML
12.5 INJECTION INTRAMUSCULAR; INTRAVENOUS
Status: DISCONTINUED | OUTPATIENT
Start: 2021-09-03 | End: 2021-09-03 | Stop reason: HOSPADM

## 2021-09-03 RX ORDER — ROCURONIUM BROMIDE 10 MG/ML
INJECTION, SOLUTION INTRAVENOUS PRN
Status: DISCONTINUED | OUTPATIENT
Start: 2021-09-03 | End: 2021-09-03 | Stop reason: SDUPTHER

## 2021-09-03 RX ORDER — IBUPROFEN 200 MG
400 TABLET ORAL EVERY 6 HOURS
Status: DISCONTINUED | OUTPATIENT
Start: 2021-09-03 | End: 2021-09-05 | Stop reason: HOSPADM

## 2021-09-03 RX ORDER — SODIUM CHLORIDE 9 MG/ML
INJECTION, SOLUTION INTRAVENOUS CONTINUOUS
Status: DISCONTINUED | OUTPATIENT
Start: 2021-09-03 | End: 2021-09-03

## 2021-09-03 RX ORDER — PROPOFOL 10 MG/ML
INJECTION, EMULSION INTRAVENOUS PRN
Status: DISCONTINUED | OUTPATIENT
Start: 2021-09-03 | End: 2021-09-03 | Stop reason: SDUPTHER

## 2021-09-03 RX ORDER — MEPERIDINE HYDROCHLORIDE 25 MG/ML
12.5 INJECTION INTRAMUSCULAR; INTRAVENOUS; SUBCUTANEOUS EVERY 5 MIN PRN
Status: DISCONTINUED | OUTPATIENT
Start: 2021-09-03 | End: 2021-09-03 | Stop reason: HOSPADM

## 2021-09-03 RX ORDER — SODIUM CHLORIDE, SODIUM LACTATE, POTASSIUM CHLORIDE, AND CALCIUM CHLORIDE .6; .31; .03; .02 G/100ML; G/100ML; G/100ML; G/100ML
IRRIGANT IRRIGATION PRN
Status: DISCONTINUED | OUTPATIENT
Start: 2021-09-03 | End: 2021-09-03 | Stop reason: ALTCHOICE

## 2021-09-03 RX ORDER — INSULIN LISPRO 100 [IU]/ML
0-3 INJECTION, SOLUTION INTRAVENOUS; SUBCUTANEOUS NIGHTLY
Status: DISCONTINUED | OUTPATIENT
Start: 2021-09-03 | End: 2021-09-04

## 2021-09-03 RX ORDER — SODIUM CHLORIDE 0.9 % (FLUSH) 0.9 %
5-40 SYRINGE (ML) INJECTION PRN
Status: DISCONTINUED | OUTPATIENT
Start: 2021-09-03 | End: 2021-09-03 | Stop reason: HOSPADM

## 2021-09-03 RX ORDER — FENTANYL CITRATE 50 UG/ML
INJECTION, SOLUTION INTRAMUSCULAR; INTRAVENOUS PRN
Status: DISCONTINUED | OUTPATIENT
Start: 2021-09-03 | End: 2021-09-03 | Stop reason: SDUPTHER

## 2021-09-03 RX ORDER — SUCCINYLCHOLINE/SOD CL,ISO/PF 200MG/10ML
SYRINGE (ML) INTRAVENOUS PRN
Status: DISCONTINUED | OUTPATIENT
Start: 2021-09-03 | End: 2021-09-03 | Stop reason: SDUPTHER

## 2021-09-03 RX ORDER — ONDANSETRON 2 MG/ML
4 INJECTION INTRAMUSCULAR; INTRAVENOUS
Status: DISCONTINUED | OUTPATIENT
Start: 2021-09-03 | End: 2021-09-03 | Stop reason: HOSPADM

## 2021-09-03 RX ORDER — HYDRALAZINE HYDROCHLORIDE 20 MG/ML
5 INJECTION INTRAMUSCULAR; INTRAVENOUS EVERY 6 HOURS PRN
Status: DISCONTINUED | OUTPATIENT
Start: 2021-09-03 | End: 2021-09-05 | Stop reason: HOSPADM

## 2021-09-03 RX ORDER — FENTANYL CITRATE 50 UG/ML
25 INJECTION, SOLUTION INTRAMUSCULAR; INTRAVENOUS EVERY 5 MIN PRN
Status: DISCONTINUED | OUTPATIENT
Start: 2021-09-03 | End: 2021-09-03 | Stop reason: HOSPADM

## 2021-09-03 RX ORDER — SODIUM CHLORIDE, SODIUM LACTATE, POTASSIUM CHLORIDE, CALCIUM CHLORIDE 600; 310; 30; 20 MG/100ML; MG/100ML; MG/100ML; MG/100ML
INJECTION, SOLUTION INTRAVENOUS CONTINUOUS
Status: DISCONTINUED | OUTPATIENT
Start: 2021-09-03 | End: 2021-09-03

## 2021-09-03 RX ORDER — DEXTROSE MONOHYDRATE 50 MG/ML
100 INJECTION, SOLUTION INTRAVENOUS PRN
Status: DISCONTINUED | OUTPATIENT
Start: 2021-09-03 | End: 2021-09-05 | Stop reason: HOSPADM

## 2021-09-03 RX ORDER — ROPIVACAINE HYDROCHLORIDE 5 MG/ML
INJECTION, SOLUTION EPIDURAL; INFILTRATION; PERINEURAL
Status: COMPLETED
Start: 2021-09-03 | End: 2021-09-03

## 2021-09-03 RX ORDER — DEXTROSE MONOHYDRATE 25 G/50ML
12.5 INJECTION, SOLUTION INTRAVENOUS PRN
Status: DISCONTINUED | OUTPATIENT
Start: 2021-09-03 | End: 2021-09-05 | Stop reason: HOSPADM

## 2021-09-03 RX ORDER — NICOTINE POLACRILEX 4 MG
15 LOZENGE BUCCAL PRN
Status: DISCONTINUED | OUTPATIENT
Start: 2021-09-03 | End: 2021-09-05 | Stop reason: HOSPADM

## 2021-09-03 RX ORDER — ROPIVACAINE HYDROCHLORIDE 5 MG/ML
INJECTION, SOLUTION EPIDURAL; INFILTRATION; PERINEURAL
Status: COMPLETED | OUTPATIENT
Start: 2021-09-03 | End: 2021-09-03

## 2021-09-03 RX ORDER — SODIUM CHLORIDE 9 MG/ML
25 INJECTION, SOLUTION INTRAVENOUS PRN
Status: DISCONTINUED | OUTPATIENT
Start: 2021-09-03 | End: 2021-09-03 | Stop reason: HOSPADM

## 2021-09-03 RX ORDER — OXYCODONE HYDROCHLORIDE 5 MG/1
5 TABLET ORAL EVERY 4 HOURS PRN
Status: DISCONTINUED | OUTPATIENT
Start: 2021-09-03 | End: 2021-09-05 | Stop reason: HOSPADM

## 2021-09-03 RX ORDER — ONDANSETRON 4 MG/1
4 TABLET, ORALLY DISINTEGRATING ORAL EVERY 8 HOURS PRN
Status: DISCONTINUED | OUTPATIENT
Start: 2021-09-03 | End: 2021-09-05 | Stop reason: HOSPADM

## 2021-09-03 RX ORDER — MIDAZOLAM HYDROCHLORIDE 1 MG/ML
INJECTION INTRAMUSCULAR; INTRAVENOUS PRN
Status: DISCONTINUED | OUTPATIENT
Start: 2021-09-03 | End: 2021-09-03 | Stop reason: SDUPTHER

## 2021-09-03 RX ORDER — SODIUM CHLORIDE 0.9 % (FLUSH) 0.9 %
5-40 SYRINGE (ML) INJECTION EVERY 12 HOURS SCHEDULED
Status: DISCONTINUED | OUTPATIENT
Start: 2021-09-03 | End: 2021-09-03 | Stop reason: HOSPADM

## 2021-09-03 RX ORDER — FENTANYL CITRATE 50 UG/ML
50 INJECTION, SOLUTION INTRAMUSCULAR; INTRAVENOUS EVERY 5 MIN PRN
Status: DISCONTINUED | OUTPATIENT
Start: 2021-09-03 | End: 2021-09-03 | Stop reason: HOSPADM

## 2021-09-03 RX ORDER — LABETALOL HYDROCHLORIDE 5 MG/ML
5 INJECTION, SOLUTION INTRAVENOUS EVERY 10 MIN PRN
Status: DISCONTINUED | OUTPATIENT
Start: 2021-09-03 | End: 2021-09-03 | Stop reason: HOSPADM

## 2021-09-03 RX ORDER — SODIUM CHLORIDE 9 MG/ML
25 INJECTION, SOLUTION INTRAVENOUS PRN
Status: DISCONTINUED | OUTPATIENT
Start: 2021-09-03 | End: 2021-09-05 | Stop reason: HOSPADM

## 2021-09-03 RX ORDER — DEXAMETHASONE SODIUM PHOSPHATE 4 MG/ML
INJECTION, SOLUTION INTRA-ARTICULAR; INTRALESIONAL; INTRAMUSCULAR; INTRAVENOUS; SOFT TISSUE PRN
Status: DISCONTINUED | OUTPATIENT
Start: 2021-09-03 | End: 2021-09-03 | Stop reason: SDUPTHER

## 2021-09-03 RX ORDER — INSULIN LISPRO 100 [IU]/ML
0-6 INJECTION, SOLUTION INTRAVENOUS; SUBCUTANEOUS
Status: DISCONTINUED | OUTPATIENT
Start: 2021-09-03 | End: 2021-09-04

## 2021-09-03 RX ORDER — OXYCODONE HYDROCHLORIDE AND ACETAMINOPHEN 5; 325 MG/1; MG/1
2 TABLET ORAL PRN
Status: DISCONTINUED | OUTPATIENT
Start: 2021-09-03 | End: 2021-09-03 | Stop reason: HOSPADM

## 2021-09-03 RX ORDER — ONDANSETRON 2 MG/ML
INJECTION INTRAMUSCULAR; INTRAVENOUS PRN
Status: DISCONTINUED | OUTPATIENT
Start: 2021-09-03 | End: 2021-09-03 | Stop reason: SDUPTHER

## 2021-09-03 RX ORDER — SODIUM CHLORIDE 0.9 % (FLUSH) 0.9 %
10 SYRINGE (ML) INJECTION PRN
Status: DISCONTINUED | OUTPATIENT
Start: 2021-09-03 | End: 2021-09-03 | Stop reason: HOSPADM

## 2021-09-03 RX ORDER — SODIUM CHLORIDE 0.9 % (FLUSH) 0.9 %
10 SYRINGE (ML) INJECTION EVERY 12 HOURS SCHEDULED
Status: DISCONTINUED | OUTPATIENT
Start: 2021-09-03 | End: 2021-09-03 | Stop reason: HOSPADM

## 2021-09-03 RX ORDER — FENTANYL CITRATE 50 UG/ML
INJECTION, SOLUTION INTRAMUSCULAR; INTRAVENOUS
Status: COMPLETED
Start: 2021-09-03 | End: 2021-09-03

## 2021-09-03 RX ORDER — LEVOFLOXACIN 5 MG/ML
500 INJECTION, SOLUTION INTRAVENOUS
Status: COMPLETED | OUTPATIENT
Start: 2021-09-03 | End: 2021-09-03

## 2021-09-03 RX ORDER — SODIUM CHLORIDE 0.9 % (FLUSH) 0.9 %
10 SYRINGE (ML) INJECTION PRN
Status: DISCONTINUED | OUTPATIENT
Start: 2021-09-03 | End: 2021-09-05 | Stop reason: HOSPADM

## 2021-09-03 RX ORDER — OXYCODONE HYDROCHLORIDE 5 MG/1
10 TABLET ORAL EVERY 4 HOURS PRN
Status: DISCONTINUED | OUTPATIENT
Start: 2021-09-03 | End: 2021-09-05 | Stop reason: HOSPADM

## 2021-09-03 RX ORDER — SODIUM CHLORIDE 0.9 % (FLUSH) 0.9 %
10 SYRINGE (ML) INJECTION EVERY 12 HOURS SCHEDULED
Status: DISCONTINUED | OUTPATIENT
Start: 2021-09-03 | End: 2021-09-05 | Stop reason: HOSPADM

## 2021-09-03 RX ORDER — OXYCODONE HYDROCHLORIDE AND ACETAMINOPHEN 5; 325 MG/1; MG/1
1 TABLET ORAL PRN
Status: DISCONTINUED | OUTPATIENT
Start: 2021-09-03 | End: 2021-09-03 | Stop reason: HOSPADM

## 2021-09-03 RX ORDER — DEXAMETHASONE SODIUM PHOSPHATE 4 MG/ML
INJECTION, SOLUTION INTRA-ARTICULAR; INTRALESIONAL; INTRAMUSCULAR; INTRAVENOUS; SOFT TISSUE
Status: COMPLETED
Start: 2021-09-03 | End: 2021-09-03

## 2021-09-03 RX ORDER — ONDANSETRON 2 MG/ML
4 INJECTION INTRAMUSCULAR; INTRAVENOUS EVERY 6 HOURS PRN
Status: DISCONTINUED | OUTPATIENT
Start: 2021-09-03 | End: 2021-09-05 | Stop reason: HOSPADM

## 2021-09-03 RX ORDER — LIDOCAINE HYDROCHLORIDE 10 MG/ML
INJECTION, SOLUTION INFILTRATION; PERINEURAL
Status: DISPENSED
Start: 2021-09-03 | End: 2021-09-04

## 2021-09-03 RX ORDER — ATORVASTATIN CALCIUM 20 MG/1
20 TABLET, FILM COATED ORAL DAILY
Status: DISCONTINUED | OUTPATIENT
Start: 2021-09-04 | End: 2021-09-04

## 2021-09-03 RX ORDER — PROCHLORPERAZINE EDISYLATE 5 MG/ML
5 INJECTION INTRAMUSCULAR; INTRAVENOUS
Status: DISCONTINUED | OUTPATIENT
Start: 2021-09-03 | End: 2021-09-03 | Stop reason: HOSPADM

## 2021-09-03 RX ORDER — MAGNESIUM HYDROXIDE 1200 MG/15ML
LIQUID ORAL CONTINUOUS PRN
Status: COMPLETED | OUTPATIENT
Start: 2021-09-03 | End: 2021-09-03

## 2021-09-03 RX ORDER — DOXAZOSIN MESYLATE 4 MG/1
8 TABLET ORAL NIGHTLY
Status: DISCONTINUED | OUTPATIENT
Start: 2021-09-03 | End: 2021-09-05 | Stop reason: HOSPADM

## 2021-09-03 RX ADMIN — MIDAZOLAM HYDROCHLORIDE 2 MG: 2 INJECTION, SOLUTION INTRAMUSCULAR; INTRAVENOUS at 08:30

## 2021-09-03 RX ADMIN — FENTANYL CITRATE 50 MCG: 50 INJECTION, SOLUTION INTRAMUSCULAR; INTRAVENOUS at 09:04

## 2021-09-03 RX ADMIN — CARVEDILOL 25 MG: 25 TABLET, FILM COATED ORAL at 21:03

## 2021-09-03 RX ADMIN — DEXAMETHASONE SODIUM PHOSPHATE 4 MG: 4 INJECTION, SOLUTION INTRAMUSCULAR; INTRAVENOUS at 09:12

## 2021-09-03 RX ADMIN — SUGAMMADEX 200 MG: 100 INJECTION, SOLUTION INTRAVENOUS at 11:42

## 2021-09-03 RX ADMIN — ACETAMINOPHEN 1000 MG: 500 TABLET, FILM COATED ORAL at 21:04

## 2021-09-03 RX ADMIN — Medication 80 MG: at 09:04

## 2021-09-03 RX ADMIN — METRONIDAZOLE 500 MG: 500 INJECTION, SOLUTION INTRAVENOUS at 09:10

## 2021-09-03 RX ADMIN — Medication 10 ML: at 19:49

## 2021-09-03 RX ADMIN — INSULIN LISPRO 1 UNITS: 100 INJECTION, SOLUTION INTRAVENOUS; SUBCUTANEOUS at 21:08

## 2021-09-03 RX ADMIN — FENTANYL CITRATE 100 MCG: 50 INJECTION, SOLUTION INTRAMUSCULAR; INTRAVENOUS at 08:30

## 2021-09-03 RX ADMIN — PROPOFOL 200 MG: 10 INJECTION, EMULSION INTRAVENOUS at 09:04

## 2021-09-03 RX ADMIN — DOXAZOSIN 8 MG: 4 TABLET ORAL at 21:03

## 2021-09-03 RX ADMIN — SODIUM CHLORIDE, POTASSIUM CHLORIDE, SODIUM LACTATE AND CALCIUM CHLORIDE: 600; 310; 30; 20 INJECTION, SOLUTION INTRAVENOUS at 08:44

## 2021-09-03 RX ADMIN — IBUPROFEN 400 MG: 200 TABLET, FILM COATED ORAL at 18:12

## 2021-09-03 RX ADMIN — Medication 160 MG: at 09:04

## 2021-09-03 RX ADMIN — ENOXAPARIN SODIUM 40 MG: 40 INJECTION SUBCUTANEOUS at 08:45

## 2021-09-03 RX ADMIN — ROPIVACAINE HYDROCHLORIDE 30 ML: 5 INJECTION, SOLUTION EPIDURAL; INFILTRATION; PERINEURAL at 08:46

## 2021-09-03 RX ADMIN — ONDANSETRON 4 MG: 2 INJECTION INTRAMUSCULAR; INTRAVENOUS at 09:13

## 2021-09-03 RX ADMIN — HYDRALAZINE HYDROCHLORIDE 5 MG: 20 INJECTION, SOLUTION INTRAMUSCULAR; INTRAVENOUS at 19:47

## 2021-09-03 RX ADMIN — LEVOFLOXACIN 500 MG: 5 INJECTION, SOLUTION INTRAVENOUS at 09:15

## 2021-09-03 RX ADMIN — ACETAMINOPHEN 1000 MG: 500 TABLET, FILM COATED ORAL at 08:47

## 2021-09-03 RX ADMIN — FENTANYL CITRATE 50 MCG: 50 INJECTION, SOLUTION INTRAMUSCULAR; INTRAVENOUS at 10:25

## 2021-09-03 RX ADMIN — ROCURONIUM BROMIDE 10 MG: 10 INJECTION INTRAVENOUS at 09:04

## 2021-09-03 RX ADMIN — ACETAMINOPHEN 1000 MG: 500 TABLET, FILM COATED ORAL at 15:43

## 2021-09-03 RX ADMIN — FENTANYL CITRATE 50 MCG: 50 INJECTION, SOLUTION INTRAMUSCULAR; INTRAVENOUS at 12:57

## 2021-09-03 RX ADMIN — ROCURONIUM BROMIDE 40 MG: 10 INJECTION INTRAVENOUS at 09:16

## 2021-09-03 RX ADMIN — ROCURONIUM BROMIDE 30 MG: 10 INJECTION INTRAVENOUS at 10:15

## 2021-09-03 RX ADMIN — FENTANYL CITRATE 50 MCG: 50 INJECTION, SOLUTION INTRAMUSCULAR; INTRAVENOUS at 13:08

## 2021-09-03 RX ADMIN — SODIUM CHLORIDE, POTASSIUM CHLORIDE, SODIUM LACTATE AND CALCIUM CHLORIDE: 600; 310; 30; 20 INJECTION, SOLUTION INTRAVENOUS at 10:34

## 2021-09-03 ASSESSMENT — PULMONARY FUNCTION TESTS
PIF_VALUE: 19
PIF_VALUE: 14
PIF_VALUE: 14
PIF_VALUE: 19
PIF_VALUE: 21
PIF_VALUE: 27
PIF_VALUE: 28
PIF_VALUE: 19
PIF_VALUE: 28
PIF_VALUE: 27
PIF_VALUE: 27
PIF_VALUE: 19
PIF_VALUE: 28
PIF_VALUE: 20
PIF_VALUE: 19
PIF_VALUE: 15
PIF_VALUE: 19
PIF_VALUE: 20
PIF_VALUE: 19
PIF_VALUE: 28
PIF_VALUE: 28
PIF_VALUE: 19
PIF_VALUE: 28
PIF_VALUE: 19
PIF_VALUE: 14
PIF_VALUE: 20
PIF_VALUE: 1
PIF_VALUE: 19
PIF_VALUE: 19
PIF_VALUE: 28
PIF_VALUE: 28
PIF_VALUE: 20
PIF_VALUE: 19
PIF_VALUE: 28
PIF_VALUE: 14
PIF_VALUE: 28
PIF_VALUE: 14
PIF_VALUE: 21
PIF_VALUE: 20
PIF_VALUE: 14
PIF_VALUE: 23
PIF_VALUE: 18
PIF_VALUE: 2
PIF_VALUE: 27
PIF_VALUE: 19
PIF_VALUE: 28
PIF_VALUE: 28
PIF_VALUE: 20
PIF_VALUE: 14
PIF_VALUE: 19
PIF_VALUE: 25
PIF_VALUE: 1
PIF_VALUE: 23
PIF_VALUE: 27
PIF_VALUE: 4
PIF_VALUE: 23
PIF_VALUE: 19
PIF_VALUE: 19
PIF_VALUE: 14
PIF_VALUE: 14
PIF_VALUE: 0
PIF_VALUE: 19
PIF_VALUE: 28
PIF_VALUE: 28
PIF_VALUE: 19
PIF_VALUE: 14
PIF_VALUE: 28
PIF_VALUE: 27
PIF_VALUE: 28
PIF_VALUE: 20
PIF_VALUE: 16
PIF_VALUE: 19
PIF_VALUE: 19
PIF_VALUE: 24
PIF_VALUE: 27
PIF_VALUE: 19
PIF_VALUE: 19
PIF_VALUE: 29
PIF_VALUE: 19
PIF_VALUE: 28
PIF_VALUE: 19
PIF_VALUE: 28
PIF_VALUE: 29
PIF_VALUE: 27
PIF_VALUE: 20
PIF_VALUE: 20
PIF_VALUE: 19
PIF_VALUE: 20
PIF_VALUE: 19
PIF_VALUE: 28
PIF_VALUE: 14
PIF_VALUE: 21
PIF_VALUE: 14
PIF_VALUE: 19
PIF_VALUE: 20
PIF_VALUE: 29
PIF_VALUE: 14
PIF_VALUE: 19
PIF_VALUE: 28
PIF_VALUE: 20
PIF_VALUE: 19
PIF_VALUE: 21
PIF_VALUE: 20
PIF_VALUE: 2
PIF_VALUE: 21
PIF_VALUE: 20
PIF_VALUE: 19
PIF_VALUE: 1
PIF_VALUE: 20
PIF_VALUE: 20
PIF_VALUE: 28
PIF_VALUE: 28
PIF_VALUE: 21
PIF_VALUE: 23
PIF_VALUE: 28
PIF_VALUE: 19
PIF_VALUE: 21
PIF_VALUE: 14
PIF_VALUE: 27
PIF_VALUE: 28
PIF_VALUE: 19
PIF_VALUE: 28
PIF_VALUE: 21
PIF_VALUE: 19
PIF_VALUE: 19
PIF_VALUE: 28
PIF_VALUE: 28
PIF_VALUE: 17
PIF_VALUE: 28
PIF_VALUE: 19
PIF_VALUE: 27
PIF_VALUE: 19
PIF_VALUE: 28
PIF_VALUE: 28
PIF_VALUE: 20
PIF_VALUE: 27
PIF_VALUE: 19
PIF_VALUE: 28
PIF_VALUE: 20
PIF_VALUE: 28
PIF_VALUE: 0
PIF_VALUE: 19
PIF_VALUE: 29
PIF_VALUE: 14
PIF_VALUE: 14
PIF_VALUE: 19
PIF_VALUE: 14
PIF_VALUE: 19
PIF_VALUE: 2
PIF_VALUE: 20
PIF_VALUE: 27
PIF_VALUE: 14
PIF_VALUE: 27
PIF_VALUE: 14
PIF_VALUE: 19
PIF_VALUE: 19
PIF_VALUE: 20
PIF_VALUE: 27
PIF_VALUE: 0
PIF_VALUE: 19
PIF_VALUE: 4
PIF_VALUE: 14
PIF_VALUE: 20
PIF_VALUE: 19
PIF_VALUE: 1

## 2021-09-03 ASSESSMENT — PAIN SCALES - GENERAL
PAINLEVEL_OUTOF10: 8
PAINLEVEL_OUTOF10: 2
PAINLEVEL_OUTOF10: 0
PAINLEVEL_OUTOF10: 3
PAINLEVEL_OUTOF10: 2
PAINLEVEL_OUTOF10: 2
PAINLEVEL_OUTOF10: 4
PAINLEVEL_OUTOF10: 3
PAINLEVEL_OUTOF10: 0
PAINLEVEL_OUTOF10: 9

## 2021-09-03 ASSESSMENT — PAIN - FUNCTIONAL ASSESSMENT: PAIN_FUNCTIONAL_ASSESSMENT: 0-10

## 2021-09-03 ASSESSMENT — PAIN DESCRIPTION - FREQUENCY: FREQUENCY: CONTINUOUS

## 2021-09-03 ASSESSMENT — PAIN DESCRIPTION - PROGRESSION: CLINICAL_PROGRESSION: GRADUALLY WORSENING

## 2021-09-03 ASSESSMENT — PAIN DESCRIPTION - ONSET: ONSET: GRADUAL

## 2021-09-03 ASSESSMENT — PAIN DESCRIPTION - ORIENTATION: ORIENTATION: ANTERIOR

## 2021-09-03 ASSESSMENT — PAIN DESCRIPTION - DESCRIPTORS: DESCRIPTORS: ACHING

## 2021-09-03 ASSESSMENT — PAIN DESCRIPTION - LOCATION: LOCATION: ABDOMEN

## 2021-09-03 ASSESSMENT — PAIN DESCRIPTION - PAIN TYPE: TYPE: SURGICAL PAIN

## 2021-09-03 NOTE — H&P
General Surgery   Resident History and Physical       Chief Complaint: colon adenocarcinoma    History of Present Illness:    Verito Mueller is a 70 y.o. male with complicated medical Hx including CAD, MI s/p remote stent, T2DM, ANTONIETTA on CPAP who had rectal bleeding and colonoscopy with left colon mass with adenocarcinoma. Pt denies rectal bleeding this AM. Has held his asa for a few days. No other changes since was last seen in the clinic. Has not had his CEA lab drawn. Sleep with cpap, will be brought over by wife. Pt stated his diabetes has been controlled with diet, last A1c 6.7 this month. Past Medical History:        Diagnosis Date    CAD (coronary artery disease) 7/9/2004    Coronary artery disease involving native coronary artery of native heart without angina pectoris     2004 cath > Taxus stents distal/prox RCA 2006 Myoview> EF 69%, small inferior fixed defect distal RCA     Diabetes mellitus with microalbuminuric diabetic nephropathy ([address] 05/01/2015    157    Diverticulosis of colon 08/31/2018    moderate diffuse    DM type 2 (diabetes mellitus, type 2) ([address] 7/7/2014    ED (erectile dysfunction)     Epistaxis 01/01/2012    cautry    Homocysteinemia ([address] 09/24/2014    13    HTN (hypertension), benign 01/01/2001    moderate concentric left ventricular hypertrophy 2/16/17    Hyperlipidemia LDL goal < 70 7/12/2004    Low testosterone 05/17/2013    204    MI (myocardial infarction) ([address] 07/09/2004    with 2 stents RCA prox and distal    Neurofibromatosis ([address]     right leg lateral neurofibroma    ANTONIETTA (obstructive sleep apnea) 06/19/2013    CPAP      PSA elevation 1/1/2000    No Bx    Sensorineural hearing loss, bilateral 7/3/2013    right > left    Vitamin D deficiency 01/27/2014    26       Past Surgical History:           Procedure Laterality Date    CARDIAC SURGERY  2004    STENTS    COLONOSCOPY  01/01/2006    NO POLYPS    COLONOSCOPY N/A 08/31/2018    Tubular adenoma.  Every 5 year colonoscopy. Rectal bleeding for 4 days postop.  COLONOSCOPY N/A 8/16/2021    COLONOSCOPY POLYPECTOMY SNARE/COLD BIOPSY performed by Linda Ken MD at [address] [facility]  8/16/2021    COLONOSCOPY WITH BIOPSY performed by Linda Ken MD at [address]  8/16/2021    COLONOSCOPY SUBMUCOSAL SPOT INJECTION performed by Linda Ken MD at [facility]  7/12/2004    2 stents in RCA    LEG SURGERY Left ~2001    [facility]  Lat calf & lat     NASAL HEMORRHAGE CONTROL  1/1/2012       Allergies:  Patient has no known allergies. Medications:   Home Meds  No current facility-administered medications on file prior to encounter. Current Outpatient Medications on File Prior to Encounter   Medication Sig Dispense Refill    hydrALAZINE (APRESOLINE) 25 MG tablet Take 1 tablet by mouth every 8 hours 90 tablet 3    metroNIDAZOLE (FLAGYL) 500 MG tablet Take one tablet by mouth 3 times on the day prior to surgery. Take one tablet at 1pm, 2pm and 9pm. 3 tablet 0    neomycin (MYCIFRADIN) 500 MG tablet Take two tablets 3 times the day before surgery. Take two tablets at 1pm, two tablets at 2pm and two tablets at 9pm. (Patient not taking: Reported on 8/23/2021) 6 tablet 0    sildenafil (VIAGRA) 50 MG tablet Take 1 tablet by mouth daily as needed for Erectile Dysfunction 10 tablet 3    ONETOUCH DELICA LANCETS 71W MISC Test blood sugar twice daily. 3    travoprost, benzalkonium, (TRAVATAN) 0.004 % ophthalmic solution Place 1 drop into both eyes nightly.  glucose blood VI test strips (ASCENSIA AUTODISC VI;ONE TOUCH ULTRA TEST VI) strip 1 each by In Vitro route 2 times daily. As needed. 100 each 3    Blood Glucose Monitoring Suppl (BLOOD GLUCOSE METER) KIT 1 Device by Does not apply route 2 times daily. 1 kit 0    aspirin 81 MG tablet Take 81 mg by mouth daily.       therapeutic multivitamin-minerals (THERAGRAN-M) tablet Take 1 tablet by mouth daily. Current Meds  sodium chloride flush 0.9 % injection 10 mL, 2 times per day  sodium chloride flush 0.9 % injection 10 mL, PRN  0.9 % sodium chloride infusion, PRN  0.9 % sodium chloride infusion, Continuous  lactated ringers infusion, Continuous        Family History:   Family History   Problem Relation Age of Onset    High Blood Pressure Mother     High Blood Pressure Father     Lung Cancer Father         Smoker    Stroke Brother         Brain aneurysm    Hypertension Brother     Other Brother         Prostate    No Known Problems Son        Social History:   TOBACCO:   reports that he quit smoking about 45 years ago. His smoking use included pipe. He smoked 0.00 packs per day for 30.00 years. He has never used smokeless tobacco.  ETOH:   reports current alcohol use. DRUGS:   reports no history of drug use. ROS: A 14 point review of systems was conducted, significant findings as noted in HPI. All other systems negative. Physical exam:    Vitals:    08/31/21 1706 09/01/21 0748 09/03/21 0655   BP:   (!) 145/74   Pulse:   70   Resp:   15   Temp:   97.3 °F (36.3 °C)   TempSrc:   Temporal   SpO2:   100%   Weight: 280 lb (127 kg) 275 lb (124.7 kg) 275 lb (124.7 kg)   Height: 6' 1\" (1.854 m) 6' 1\" (1.854 m) 6' 1\" (1.854 m)       General appearance: alert, no acute distress, grooming appropriate  Eyes: PERRLA, no scleral icterus  Neck: trachea midline, no JVD  Chest/Lungs: CTAB, no crackles/rales, wheezes/rhonchi, normal effort  Cardiovascular: RRR, no murmurs/gallops/rubs  Abdomen: soft, non-tender, non-distended, +BS, no guarding/rigidity  Skin: warm and dry, no rashes  Extremities: no edema, no cyanosis  Neuro: A&Ox3, no focal deficits, sensation intact    Labs:    CBC: No results for input(s): WBC, HGB, HCT, MCV, PLT in the last 72 hours. BMP: No results for input(s): NA, K, CL, CO2, PHOS, BUN, CREATININE in the last 72 hours.     Invalid input(s): CA  PT/INR: No

## 2021-09-03 NOTE — PROGRESS NOTES
Assisted Dr. Brown Angela with nerve block. Vitals monitored during procedure and remained stable. Patient answering questions during procedure and comfortable. Patient tolerated well.

## 2021-09-03 NOTE — ANESTHESIA PRE PROCEDURE
Department of Anesthesiology  Preprocedure Note       Name:  Juliette Kate   Age:  70 y.o.  :  1950                                          MRN:  [id number]         Date:  9/3/2021      Surgeon: Leandro Gilletteor):  Rodriguez Tony MD    Procedure: Procedure(s):  LAPAROSCOPIC LEFT COLECTOMY, POSSIBLE OPEN    Medications prior to admission:   Prior to Admission medications    Medication Sig Start Date End Date Taking? Authorizing Provider   carvedilol (COREG) 25 MG tablet 1 po bid 21  Yes Jonnie Vazquez MD   atorvastatin (LIPITOR) 20 MG tablet TAKE 1 TABLET BY MOUTH EVERY DAY 21  Yes Jonnie Vazquez MD   amLODIPine (NORVASC) 10 MG tablet TAKE 1 TABLET BY MOUTH EVERY DAY 21  Yes Jonnie Vazquez MD   cloNIDine (CATAPRES) 0.1 MG tablet 1 po daily at night 21  Yes Jonnie Vazquez MD   doxazosin (CARDURA) 8 MG tablet TAKE 1 TABLET BY MOUTH EVERY NIGHT 21  Yes Jonnie Vazquez MD   valsartan-hydroCHLOROthiazide (DIOVAN-HCT) 320-12.5 MG per tablet Take 1 tablet by mouth daily 21  Yes Jonnie Vazquez MD   hydrALAZINE (APRESOLINE) 25 MG tablet Take 1 tablet by mouth every 8 hours 21  Yes MACHO Davis - CNP   metroNIDAZOLE (FLAGYL) 500 MG tablet Take one tablet by mouth 3 times on the day prior to surgery. Take one tablet at 1pm, 2pm and 9pm. 21   Rodriguez Tony MD   neomycin (MYCIFRADIN) 500 MG tablet Take two tablets 3 times the day before surgery. Take two tablets at 1pm, two tablets at 2pm and two tablets at 9pm.  Patient not taking: Reported on 2021   Rodriguez Tony MD   sildenafil (VIAGRA) 50 MG tablet Take 1 tablet by mouth daily as needed for Erectile Dysfunction 20   Zena Evans MD   [facility] 21N MISC Test blood sugar twice daily. 14   Historical Provider, MD   travoprost, benzalkonium, (TRAVATAN) 0.004 % ophthalmic solution Place 1 drop into both eyes nightly.     Historical Provider, MD   glucose blood VI test strips (ASCENSIA AUTODISC VI;ONE TOUCH ULTRA TEST VI) strip 1 each by In Vitro route 2 times daily. As needed. 8/18/14   Jessica Harris DO   Blood Glucose Monitoring Suppl (BLOOD GLUCOSE METER) KIT 1 Device by Does not apply route 2 times daily. 7/7/14   Jessica Harris DO   aspirin 81 MG tablet Take 81 mg by mouth daily. Historical Provider, MD   therapeutic multivitamin-minerals (THERAGRAN-M) tablet Take 1 tablet by mouth daily.     Historical Provider, MD       Current medications:    Current Facility-Administered Medications   Medication Dose Route Frequency Provider Last Rate Last Admin    midazolam (VERSED) 2 MG/2ML injection             fentaNYL (SUBLIMAZE) 100 MCG/2ML injection             ropivacaine (NAROPIN) 0.5% injection             Dexamethasone Sodium Phosphate 20 MG/5ML injection             sodium chloride flush 0.9 % injection 10 mL  10 mL IntraVENous 2 times per day Irean Balsam, DO        sodium chloride flush 0.9 % injection 10 mL  10 mL IntraVENous PRN Irean Balsam, DO        0.9 % sodium chloride infusion  25 mL IntraVENous PRN Irean Balsam, DO        0.9 % sodium chloride infusion   IntraVENous Continuous Irean Balsam, DO        lactated ringers infusion   IntraVENous Continuous Irean Balsam, DO        0.9 % sodium chloride infusion  25 mL IntraVENous PRN Bruce Stone MD        acetaminophen (TYLENOL) tablet 1,000 mg  1,000 mg Oral Once Bruce Stone MD        enoxaparin (LOVENOX) injection 40 mg  40 mg SubCUTAneous Once Bruce Stone MD        metronidazole (FLAGYL) 500 mg in NaCl 100 mL IVPB premix  500 mg IntraVENous On Call to Via Deepa Sheldon MD        And    levoFLOXacin (LEVAQUIN) 500 MG/100ML infusion 500 mg  500 mg IntraVENous On Call to Via Deepa Sheldon MD        sodium chloride flush 0.9 % injection 5-40 mL  5-40 mL IntraVENous 2 times per day Bruce Stone MD        sodium chloride flush 0.9 % injection 5-40 mL  5-40 mL IntraVENous PRN Bruce Stone MD Allergies:  No Known Allergies    Problem List:    Patient Active Problem List   Diagnosis Code    Coronary artery disease involving native coronary artery of native heart without angina pectoris I25.10    HTN (hypertension), benign I10    Hyperlipidemia with target LDL less than 70 E78.5    ED (erectile dysfunction) N52.9    DM type 2 (diabetes mellitus, type 2) ([facility] E11.9    Vitamin D deficiency E55.9    Homocysteinemia ([facility] E72.11    PSA elevation R97.20    Neurofibromatosis ([facility] [facility] Q85.00    Low testosterone R79.89    Sensorineural hearing loss, bilateral H90.3    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) E11.21    Adenomatous polyp of sigmoid colon D12.5    Morbidly obese (HCC) E66.01    Ptosis of both eyelids H02.403    Malignant neoplasm of descending colon ([facility] [facility] C18.6       Past Medical History:        Diagnosis Date    CAD (coronary artery disease) 7/9/2004    Coronary artery disease involving native coronary artery of native heart without angina pectoris     2004 cath > Taxus stents distal/prox RCA 2006 Myoview> EF 69%, small inferior fixed defect distal RCA     Diabetes mellitus with microalbuminuric diabetic nephropathy ([facility] 05/01/2015    157    Diverticulosis of colon 08/31/2018    moderate diffuse    DM type 2 (diabetes mellitus, type 2) ([facility] 7/7/2014    ED (erectile dysfunction)     Epistaxis 01/01/2012    cautry    Homocysteinemia ([facility] 09/24/2014    13    HTN (hypertension), benign 01/01/2001    moderate concentric left ventricular hypertrophy 2/16/17    Hyperlipidemia LDL goal < 70 7/12/2004    Low testosterone 05/17/2013    204    MI (myocardial infarction) ([facility] [facility] 07/09/2004    with 2 stents RCA prox and distal    Neurofibromatosis (HCC)     right leg lateral neurofibroma    ANTONIETTA (obstructive sleep apnea) 06/19/2013    CPAP      PSA elevation 1/1/2000    No Bx    Sensorineural hearing loss, bilateral 7/3/2013    right > left    Vitamin D deficiency 2014    26       Past Surgical History:        Procedure Laterality Date    CARDIAC SURGERY  2004    STENTS    COLONOSCOPY  2006    NO POLYPS    COLONOSCOPY N/A 2018    Tubular adenoma. Every 5 year colonoscopy. Rectal bleeding for 4 days postop.  COLONOSCOPY N/A 2021    COLONOSCOPY POLYPECTOMY SNARE/COLD BIOPSY performed by Malick Daniels MD at [address] [place] [address]  2021    COLONOSCOPY WITH BIOPSY performed by Malick Daniels MD at [address] [place] [address]  2021    COLONOSCOPY SUBMUCOSAL SPOT INJECTION performed by Malick Daniels MD at [facility]  2004    2 stents in RCA    LEG SURGERY Left ~    [facility]  Lat calf & lat     NASAL HEMORRHAGE CONTROL  2012       Social History:    Social History     Tobacco Use    Smoking status: Former Smoker     Packs/day: 0.00     Years: 30.00     Pack years: 0.00     Types: Pipe     Quit date: 1976     Years since quittin.7    Smokeless tobacco: Never Used    Tobacco comment: 30-33 year 2 bowls /day   Substance Use Topics    Alcohol use: Yes     Alcohol/week: 0.0 standard drinks     Comment: Wine occasionally ie wedding/business event. Was drinking mid 34's 9-8 scotch 1 yr.                                 Counseling given: Not Answered  Comment: 30-33 year 2 bowls /day      Vital Signs (Current):   Vitals:    21 1706 21 0748 21 0655 21 0830   BP:   (!) 145/74 (!) 150/69   Pulse:   70 69   Resp:   15 16   Temp:   97.3 °F (36.3 °C)    TempSrc:   Temporal    SpO2:   100% 100%   Weight: 280 lb (127 kg) 275 lb (124.7 kg) 275 lb (124.7 kg)    Height: 6' 1\" (1.854 m) 6' 1\" (1.854 m) 6' 1\" (1.854 m)                                               BP Readings from Last 3 Encounters:   21 (!) 150/69   21 (!) 144/64   21 126/80       NPO Status: Negative Neuro/Psych ROS              GI/Hepatic/Renal: Neg GI/Hepatic/Renal ROS            Endo/Other:    (+) Diabetes (diet controlled), . Abdominal:             Vascular: negative vascular ROS. Other Findings:             Anesthesia Plan      general and regional     ASA 3       Induction: intravenous. MIPS: Postoperative opioids intended and Prophylactic antiemetics administered. Anesthetic plan and risks discussed with patient. Use of blood products discussed with patient whom consented to blood products. Plan discussed with CRNA.     Attending anesthesiologist reviewed and agrees with Callum Hernandez MD   9/3/2021

## 2021-09-03 NOTE — ANESTHESIA POSTPROCEDURE EVALUATION
Department of Anesthesiology  Postprocedure Note    Patient: Surjit Saha  MRN: [id number]  [place]: 1950  Date of evaluation: 9/3/2021  Time:  2:57 PM     Procedure Summary     Date: 09/03/21 Room / Location: [address] [place] [address]    Anesthesia Start: 0900 Anesthesia Stop: 8218    Procedure: LAPAROSCOPIC PARTIAL COLECTOMY; LAPAROSCOPIC MOBILIZATION OF SPLENIC FLEXURE (N/A ) Diagnosis:       Cancer of descending colon ([facility] [address] [facility]      (DESCENDING COLON CANCER)    Surgeons: Oumar Jerez MD Responsible Provider: Dominique Tarango MD    Anesthesia Type: general, regional ASA Status: 3          Anesthesia Type: general, regional    Chad Phase I: Chad Score: 8    Chad Phase II:      Last vitals: Reviewed and per EMR flowsheets.        Anesthesia Post Evaluation    Patient location during evaluation: PACU  Patient participation: complete - patient participated  Level of consciousness: awake and alert  Pain score: 2  Airway patency: patent  Nausea & Vomiting: no nausea and no vomiting  Cardiovascular status: blood pressure returned to baseline  Respiratory status: acceptable  Hydration status: euvolemic

## 2021-09-03 NOTE — ANESTHESIA PROCEDURE NOTES
apparent complications.    Medications Administered  Ropivacaine (NAROPIN) injection 0.5%, 30 mL  Reason for block: post-op pain management and at surgeon's request

## 2021-09-03 NOTE — ANESTHESIA POSTPROCEDURE EVALUATION
Department of Anesthesiology  Postprocedure Note    Patient: Harjeet Rincon  MRN: [id number]  [place]: 1950  Date of evaluation: 9/3/2021  Time:  12:54 PM     Procedure Summary     Date: 09/03/21 Room / Location: [address] [place] [address] / [address]    Anesthesia Start: 0900 Anesthesia Stop:     Procedure: LAPAROSCOPIC PARTIAL COLECTOMY; LAPAROSCOPIC MOBILIZATION OF SPLENIC FLEXURE (N/A ) Diagnosis:       Cancer of descending colon ([facility] [address]      (DESCENDING COLON CANCER)    Surgeons: Mikey Sanon MD Responsible Provider: Ayad Bajwa MD    Anesthesia Type: general, regional ASA Status: 3          Anesthesia Type: No value filed. Chad Phase I: Chad Score: 8    Chad Phase II:      Last vitals: Reviewed and per EMR flowsheets.        Anesthesia Post Evaluation    Patient location during evaluation: PACU  Patient participation: complete - patient participated  Level of consciousness: awake and lethargic  Airway patency: patent  Nausea & Vomiting: no nausea and no vomiting  Complications: no  Cardiovascular status: hemodynamically stable and blood pressure returned to baseline  Respiratory status: spontaneous ventilation and nasal cannula  Hydration status: stable

## 2021-09-03 NOTE — PROGRESS NOTES
Colorectal Surgery  Post-operative Note      Procedure(s) Performed: Laparoscopic distal transverse colectomy    Subjective:   Patient reports his pain is well-controlled with medications. Patient denies nausea or vomiting. Patient is tolerating sips of clears and will be ordering dinner soon. Patient was resting in bed and has not ambulated but reports he will be contacting nurse to help him move to chair for dinner. Patient has Copeland in place. Denies flatus or BM at this time. Objective:  Anesthesia type: General      I/O    Intra op    Post op     Fluids  1000 mL  375 mL     EBL 50 mL  0 mL     Urine 300 mL  350 mL     Vitals:   Vitals:    09/03/21 1445 09/03/21 1500 09/03/21 1501 09/03/21 1529   BP: (!) 159/78 (!) 169/76  (!) 166/85   Pulse: 88 89 88 94   Resp: 11 13 18   Temp:  96.9 °F (36.1 °C)  98.6 °F (37 °C)   TempSrc:    Oral   SpO2: 100% 99%  97%   Weight:       Height:           Physical Exam:  Post-op vital signs:  Stable  General appearance: alert, no acute distress, grooming appropriate  Eyes: No scleral icterus, EOM grossly intact  Neck: trachea midline, no JVD, no lymphadenopathy, neck supple  Chest/Lungs: Normal effort with no accessory muscle use on RA  Cardiovascular: RRR, well perfused  Abdomen: Soft, appropriately-tender, incisions c/d/i and well approximated with Dermabond  Skin: warm and dry, no rashes  Extremities: no edema, no cyanosis  Genitourinary: Copeland in place with clear yellow urine  Neuro: A&Ox3, no focal deficits, sensation intact    Assessment and Plan  This is a 70y.o. year old male status post laparoscopic distal transverse colectomy secondary to distal transverse colon cancer. (9/3) POD0.     Pain management: Roxicodone pain panel, TAP Block and scheduled tylenol  Cardiovasc: hypertensive to , otherwise hemodynamically stable, will continue to monitor; PRN Hydralazine ordered  Respiratory:  IS ordered to bedside, encourage hourly IS and deep breathing, weaned off oxygen, Home CPAP use encouraged  Fluids:  none, Diet: General diet, carb control  : Copeland in place until tomorrow  Ambulation: OOB to chair, encourage ambulation  Prophylaxis: SCDs, lovenox  Antibiotics: Levofloxacin/Flagyl periop  Wound: Local wound care      Michelle Will DO  PGY1, General Surgery  09/03/21  5:06 PM  [phone]

## 2021-09-03 NOTE — PROGRESS NOTES
Pt arrived to unit around 1530, VSS, lugns clear, no adventitious heart sounds, hypoactive bowel sounds, surgical sites CD&I with surgical glue, pt awaiting dinner tray, no nausea/GI distress so far and tolerating scheduled PO meds. Pt has bed alarm on, mullins draining clear yellow urine. Will continue to monitor.

## 2021-09-03 NOTE — OP NOTE
positioning. Copeland catheter was placed revealing clear yellow  urine. His abdomen was prepped and draped in the usual sterile fashion  using chlorhexidine prep solution. Timeout was performed confirming the  patient's identity as well as the operative site. Antibiotics were  confirmed to be perfusing. All safety points were followed. SCDs were  on and functioning. Lovenox was confirmed given. I began by making an incision in the left upper quadrant midclavicular  line at Correia's point. A 0-degree 5-mm laparoscope was used to enter  the abdominal cavity in direct Optiview fashion. Abdomen was entered  without complication and insufflated to 15 mmHg. We explored the  abdomen laparoscopically, noted no evidence of metastatic disease. Additional ports were then placed including right upper quadrant, right  lower quadrant, and epigastric 5-mm ports. The patient was placed in  left-sided up and head up positioning. We started at the descending  sigmoid colon junction, started lateral to medially  off of  the white line of Toldt. We continued lateral to medially   off the Gerota's fascia. I came along the splenic flexure and took down  the splenic flexure including the splenocolic and omentocolic ligaments. I then started the mid transverse colon and started  the  omentum from the transverse colon and met the previous dissection planes  together. At this point, we had performed a complete splenic flexure  mobilization. We noted the tattoo was actually in the distal transverse  colon. We noted that this would easily come up through a small  extraction incision in the left upper quadrant. A 2.5 inch transverse extraction incision was made in the left upper  quadrant. I used a 15-blade scalpel, dissected through subcutaneous  tissue down to the fascia and the fascia excised in horizontal fashion. Rectus muscle was then taken down using LigaSure device.   The then removed. The fascia was then closed in 2  layers including a 0 Vicryl for the posterior sheath/perineum and a 0  PDS suture in a running fashion for the anterior sheath. We then went  back in laparoscopically. I ensured that there was good hemostasis. The anastomosis looked okay. There was some oozing noted from the  retroperitoneal dissection of the splenic flexure. There was no  evidence of damage to the spleen whatsoever. I then used Surgicel  powder as the bleeding was fairly minimal and good hemostasis was  obtained. We then inspected all laparoscopic ports and then removed under direct  visualization. No bleeding was noted of the  abdominal wall. Abdomen  was desufflated. All the ports were then removed. The patient was then  flattened out and all the incisions were then irrigated and closed using  combination of 3-0 Vicryl, 4-0 Monocryl, and skin glue. The patient tolerated the procedure well. He was then extubated and  brought to the PACU in stable condition. All counts were correct x2.         Peterson Comer MD    D: 09/03/2021 11:39:54       T: 09/03/2021 14:23:15     [email or web address]  Job#: [id number]     Doc#: [id number]    CC:

## 2021-09-03 NOTE — CARE COORDINATION
Cm following, pt POD#0 from home with wife ind pta. Plans to DC home no needs anticipated at DC. Pending return Gi function, pain control, tole PO diet. Wife will drive home and provide support at DC.   Electronically signed by Sharifa Rolle RN on 9/3/2021 at 3:38 PM  [phone]

## 2021-09-03 NOTE — FLOWSHEET NOTE
PACU Transfer Note    Vitals:    09/03/21 1500   BP: 169/76   Pulse: 89   Resp: 13   Temp: 96.9   SpO2: 99   BP is within 20% of admission. In: 400 [P.O.:25; I.V.:375]  Out: 225 [Urine:225]    Pain assessment:  present - adequately treated and not receiving treatment  Pain Level: 2 (patient states pain is tolerable)    Report given to Receiving unit RN at bedside. Patient is transported with all his belongings per PACU transport. Patient's family is made aware of room assignment.     9/3/2021 3:12 PM

## 2021-09-03 NOTE — ANESTHESIA PRE PROCEDURE
Department of Anesthesiology  Preprocedure Note       Name:  Harjeet Rincon   Age:  70 y.o.  :  1950                                          MRN:  [id number]         Date:  9/3/2021      Surgeon: Maryanne Cochran):  Mikey Sanon MD    Procedure: Procedure(s):  LAPAROSCOPIC LEFT COLECTOMY, POSSIBLE OPEN    Medications prior to admission:   Prior to Admission medications    Medication Sig Start Date End Date Taking? Authorizing Provider   carvedilol (COREG) 25 MG tablet 1 po bid 21  Yes Alba Bautista MD   atorvastatin (LIPITOR) 20 MG tablet TAKE 1 TABLET BY MOUTH EVERY DAY 21  Yes Alba Bautista MD   amLODIPine (NORVASC) 10 MG tablet TAKE 1 TABLET BY MOUTH EVERY DAY 21  Yes Alba Bautista MD   cloNIDine (CATAPRES) 0.1 MG tablet 1 po daily at night 21  Yes Alba Bautista MD   doxazosin (CARDURA) 8 MG tablet TAKE 1 TABLET BY MOUTH EVERY NIGHT 21  Yes Alba Bautista MD   valsartan-hydroCHLOROthiazide (DIOVAN-HCT) 320-12.5 MG per tablet Take 1 tablet by mouth daily 21  Yes Alba Bautista MD   hydrALAZINE (APRESOLINE) 25 MG tablet Take 1 tablet by mouth every 8 hours 21  Yes MACHO Davis - CNP   metroNIDAZOLE (FLAGYL) 500 MG tablet Take one tablet by mouth 3 times on the day prior to surgery. Take one tablet at 1pm, 2pm and 9pm. 21   Mikey Sanon MD   neomycin (MYCIFRADIN) 500 MG tablet Take two tablets 3 times the day before surgery. Take two tablets at 1pm, two tablets at 2pm and two tablets at 9pm.  Patient not taking: Reported on 2021   Mikey Sanon MD   sildenafil (VIAGRA) 50 MG tablet Take 1 tablet by mouth daily as needed for Erectile Dysfunction 20   Keron Hernandez MD   [facility] 00U MISC Test blood sugar twice daily. 14   Historical Provider, MD   travoprost, benzalkonium, (TRAVATAN) 0.004 % ophthalmic solution Place 1 drop into both eyes nightly.     Historical Provider, MD   glucose blood VI test strips (ASCENSIA AUTODISC VI;ONE TOUCH ULTRA TEST VI) strip 1 each by In Vitro route 2 times daily. As needed. 8/18/14   Nicole Patton DO   Blood Glucose Monitoring Suppl (BLOOD GLUCOSE METER) KIT 1 Device by Does not apply route 2 times daily. 7/7/14   Nicole Patton DO   aspirin 81 MG tablet Take 81 mg by mouth daily. Historical Provider, MD   therapeutic multivitamin-minerals (THERAGRAN-M) tablet Take 1 tablet by mouth daily.     Historical Provider, MD       Current medications:    Current Facility-Administered Medications   Medication Dose Route Frequency Provider Last Rate Last Admin    sodium chloride flush 0.9 % injection 10 mL  10 mL IntraVENous 2 times per day Pepper Binet, DO        sodium chloride flush 0.9 % injection 10 mL  10 mL IntraVENous PRN Pepper Binet, DO        0.9 % sodium chloride infusion  25 mL IntraVENous PRN Pepper Binet, DO        0.9 % sodium chloride infusion   IntraVENous Continuous Pepper Binet, DO        lactated ringers infusion   IntraVENous Continuous Pepper Binet, DO        0.9 % sodium chloride infusion  25 mL IntraVENous PRN Oumar Jerez MD        acetaminophen (TYLENOL) tablet 1,000 mg  1,000 mg Oral Once Oumar Jerez MD        enoxaparin (LOVENOX) injection 40 mg  40 mg SubCUTAneous Once Oumar Jerez MD        metronidazole (FLAGYL) 500 mg in NaCl 100 mL IVPB premix  500 mg IntraVENous On Call to Via Deepa Sheldon MD        And    levoFLOXacin (LEVAQUIN) 500 MG/100ML infusion 500 mg  500 mg IntraVENous On Call to Via Deepa Sheldon MD        sodium chloride flush 0.9 % injection 5-40 mL  5-40 mL IntraVENous 2 times per day Oumar Jerez MD        sodium chloride flush 0.9 % injection 5-40 mL  5-40 mL IntraVENous PRN Oumar Jerez MD           Allergies:  No Known Allergies    Problem List:    Patient Active Problem List   Diagnosis Code    Coronary artery disease involving native coronary artery of native heart without angina pectoris I25.10    HTN (hypertension), benign I10    Hyperlipidemia with target LDL less than 70 E78.5    ED (erectile dysfunction) N52.9    DM type 2 (diabetes mellitus, type 2) ([facility]) E11.9    Vitamin D deficiency E55.9    Homocysteinemia ([address] E72.11    PSA elevation R97.20    Neurofibromatosis ([facility]) Q85.00    Low testosterone R79.89    Sensorineural hearing loss, bilateral H90.3    Diabetes mellitus with microalbuminuric diabetic nephropathy ([facility]) E11.21    Adenomatous polyp of sigmoid colon D12.5    Morbidly obese ([facility]) E66.01    Ptosis of both eyelids H02.403    Malignant neoplasm of descending colon ([facility]) C18.6       Past Medical History:        Diagnosis Date    CAD (coronary artery disease) 7/9/2004    Coronary artery disease involving native coronary artery of native heart without angina pectoris     2004 cath > Taxus stents distal/prox RCA 2006 Myoview> EF 69%, small inferior fixed defect distal RCA     Diabetes mellitus with microalbuminuric diabetic nephropathy ([address] 05/01/2015    157    Diverticulosis of colon 08/31/2018    moderate diffuse    DM type 2 (diabetes mellitus, type 2) ([address] 7/7/2014    ED (erectile dysfunction)     Epistaxis 01/01/2012    cautry    Homocysteinemia ([address] 09/24/2014    13    HTN (hypertension), benign 01/01/2001    moderate concentric left ventricular hypertrophy 2/16/17    Hyperlipidemia LDL goal < 70 7/12/2004    Low testosterone 05/17/2013    204    MI (myocardial infarction) ([address] 07/09/2004    with 2 stents RCA prox and distal    Neurofibromatosis ([address]     right leg lateral neurofibroma    ANTONIETTA (obstructive sleep apnea) 06/19/2013    CPAP      PSA elevation 1/1/2000    No Bx    Sensorineural hearing loss, bilateral 7/3/2013    right > left    Vitamin D deficiency 01/27/2014    26       Past Surgical History:        Procedure Laterality Date    CARDIAC SURGERY  2004    STENTS    COLONOSCOPY  01/01/2006    NO POLYPS    COLONOSCOPY N/A 08/31/2018    Tubular adenoma. Every 5 year colonoscopy. Rectal bleeding for 4 days postop.  COLONOSCOPY N/A 2021    COLONOSCOPY POLYPECTOMY SNARE/COLD BIOPSY performed by Segundo Nur MD at [address] [facility]  2021    COLONOSCOPY WITH BIOPSY performed by Segundo Nur MD at [address] [facility]  2021    COLONOSCOPY SUBMUCOSAL SPOT INJECTION performed by Segundo Nur MD at [facility]  2004    2 stents in RCA    LEG SURGERY Left ~    [facility]  Lat calf & lat     NASAL HEMORRHAGE CONTROL  2012       Social History:    Social History     Tobacco Use    Smoking status: Former Smoker     Packs/day: 0.00     Years: 30.00     Pack years: 0.00     Types: Pipe     Quit date: 1976     Years since quittin.7    Smokeless tobacco: Never Used    Tobacco comment: 30-33 year 2 bowls /day   Substance Use Topics    Alcohol use: Yes     Alcohol/week: 0.0 standard drinks     Comment: Wine occasionally ie wedding/business event. Was drinking mid 34's 9-8 scotch 1 yr. Counseling given: Not Answered  Comment: -33 year 2 bowls /day      Vital Signs (Current):   Vitals:    21 1706 21 0748 21 0655   BP:   (!) 145/74   Pulse:   70   Resp:   15   Temp:   97.3 °F (36.3 °C)   TempSrc:   Temporal   SpO2:   100%   Weight: 280 lb (127 kg) 275 lb (124.7 kg) 275 lb (124.7 kg)   Height: 6' 1\" (1.854 m) 6' 1\" (1.854 m) 6' 1\" (1.854 m)                                              BP Readings from Last 3 Encounters:   21 (!) 145/74   21 (!) 144/64   21 126/80       NPO Status:                                                                                 BMI:   Wt Readings from Last 3 Encounters:   21 275 lb (124.7 kg)   21 280 lb (127 kg)   21 273 lb (123.8 kg)     Body mass index is 36.28 kg/m².     CBC:   Lab Results   Component Value Date    WBC 5.7

## 2021-09-04 LAB
ALBUMIN SERPL-MCNC: 3.8 G/DL (ref 3.4–5)
ANION GAP SERPL CALCULATED.3IONS-SCNC: 12 MMOL/L (ref 3–16)
BASOPHILS ABSOLUTE: 0 K/UL (ref 0–0.2)
BASOPHILS RELATIVE PERCENT: 0.1 %
BUN BLDV-MCNC: 19 MG/DL (ref 7–20)
CALCIUM SERPL-MCNC: 8.9 MG/DL (ref 8.3–10.6)
CEA: 2.7 NG/ML (ref 0–5)
CHLORIDE BLD-SCNC: 105 MMOL/L (ref 99–110)
CO2: 21 MMOL/L (ref 21–32)
CREAT SERPL-MCNC: 1.8 MG/DL (ref 0.8–1.3)
EOSINOPHILS ABSOLUTE: 0 K/UL (ref 0–0.6)
EOSINOPHILS RELATIVE PERCENT: 0 %
GFR AFRICAN AMERICAN: 45
GFR NON-AFRICAN AMERICAN: 37
GLUCOSE BLD-MCNC: 109 MG/DL (ref 70–99)
GLUCOSE BLD-MCNC: 115 MG/DL (ref 70–99)
GLUCOSE BLD-MCNC: 118 MG/DL (ref 70–99)
GLUCOSE BLD-MCNC: 139 MG/DL (ref 70–99)
GLUCOSE BLD-MCNC: 162 MG/DL (ref 70–99)
HCT VFR BLD CALC: 27.6 % (ref 40.5–52.5)
HEMOGLOBIN: 9.2 G/DL (ref 13.5–17.5)
LYMPHOCYTES ABSOLUTE: 0.7 K/UL (ref 1–5.1)
LYMPHOCYTES RELATIVE PERCENT: 8 %
MAGNESIUM: 2 MG/DL (ref 1.8–2.4)
MCH RBC QN AUTO: 25.8 PG (ref 26–34)
MCHC RBC AUTO-ENTMCNC: 33.4 G/DL (ref 31–36)
MCV RBC AUTO: 77.3 FL (ref 80–100)
MONOCYTES ABSOLUTE: 1 K/UL (ref 0–1.3)
MONOCYTES RELATIVE PERCENT: 10.4 %
NEUTROPHILS ABSOLUTE: 7.7 K/UL (ref 1.7–7.7)
NEUTROPHILS RELATIVE PERCENT: 81.5 %
PDW BLD-RTO: 15.4 % (ref 12.4–15.4)
PERFORMED ON: ABNORMAL
PHOSPHORUS: 4.4 MG/DL (ref 2.5–4.9)
PLATELET # BLD: 218 K/UL (ref 135–450)
PMV BLD AUTO: 7.6 FL (ref 5–10.5)
POTASSIUM SERPL-SCNC: 4.2 MMOL/L (ref 3.5–5.1)
RBC # BLD: 3.57 M/UL (ref 4.2–5.9)
SODIUM BLD-SCNC: 138 MMOL/L (ref 136–145)
WBC # BLD: 9.4 K/UL (ref 4–11)

## 2021-09-04 PROCEDURE — 6360000002 HC RX W HCPCS: Performed by: SURGERY

## 2021-09-04 PROCEDURE — 2580000003 HC RX 258

## 2021-09-04 PROCEDURE — 83735 ASSAY OF MAGNESIUM: CPT

## 2021-09-04 PROCEDURE — 6370000000 HC RX 637 (ALT 250 FOR IP): Performed by: SURGERY

## 2021-09-04 PROCEDURE — 1200000000 HC SEMI PRIVATE

## 2021-09-04 PROCEDURE — 6370000000 HC RX 637 (ALT 250 FOR IP): Performed by: STUDENT IN AN ORGANIZED HEALTH CARE EDUCATION/TRAINING PROGRAM

## 2021-09-04 PROCEDURE — 36415 COLL VENOUS BLD VENIPUNCTURE: CPT

## 2021-09-04 PROCEDURE — 2580000003 HC RX 258: Performed by: STUDENT IN AN ORGANIZED HEALTH CARE EDUCATION/TRAINING PROGRAM

## 2021-09-04 PROCEDURE — 85025 COMPLETE CBC W/AUTO DIFF WBC: CPT

## 2021-09-04 PROCEDURE — 80069 RENAL FUNCTION PANEL: CPT

## 2021-09-04 RX ORDER — SODIUM CHLORIDE 9 MG/ML
INJECTION, SOLUTION INTRAVENOUS CONTINUOUS
Status: DISCONTINUED | OUTPATIENT
Start: 2021-09-04 | End: 2021-09-05 | Stop reason: HOSPADM

## 2021-09-04 RX ORDER — ATORVASTATIN CALCIUM 20 MG/1
20 TABLET, FILM COATED ORAL DAILY
Status: DISCONTINUED | OUTPATIENT
Start: 2021-09-04 | End: 2021-09-05 | Stop reason: HOSPADM

## 2021-09-04 RX ORDER — INSULIN LISPRO 100 [IU]/ML
0-12 INJECTION, SOLUTION INTRAVENOUS; SUBCUTANEOUS
Status: DISCONTINUED | OUTPATIENT
Start: 2021-09-04 | End: 2021-09-05 | Stop reason: HOSPADM

## 2021-09-04 RX ORDER — SODIUM CHLORIDE, SODIUM LACTATE, POTASSIUM CHLORIDE, AND CALCIUM CHLORIDE .6; .31; .03; .02 G/100ML; G/100ML; G/100ML; G/100ML
1000 INJECTION, SOLUTION INTRAVENOUS ONCE
Status: COMPLETED | OUTPATIENT
Start: 2021-09-04 | End: 2021-09-04

## 2021-09-04 RX ORDER — SODIUM CHLORIDE, SODIUM LACTATE, POTASSIUM CHLORIDE, CALCIUM CHLORIDE 600; 310; 30; 20 MG/100ML; MG/100ML; MG/100ML; MG/100ML
INJECTION, SOLUTION INTRAVENOUS CONTINUOUS
Status: DISCONTINUED | OUTPATIENT
Start: 2021-09-04 | End: 2021-09-04 | Stop reason: ALTCHOICE

## 2021-09-04 RX ORDER — INSULIN LISPRO 100 [IU]/ML
0-6 INJECTION, SOLUTION INTRAVENOUS; SUBCUTANEOUS NIGHTLY
Status: DISCONTINUED | OUTPATIENT
Start: 2021-09-04 | End: 2021-09-05 | Stop reason: HOSPADM

## 2021-09-04 RX ADMIN — IBUPROFEN 400 MG: 200 TABLET, FILM COATED ORAL at 06:17

## 2021-09-04 RX ADMIN — CARVEDILOL 25 MG: 25 TABLET, FILM COATED ORAL at 20:21

## 2021-09-04 RX ADMIN — ATORVASTATIN CALCIUM 20 MG: 20 TABLET, FILM COATED ORAL at 20:21

## 2021-09-04 RX ADMIN — IBUPROFEN 400 MG: 200 TABLET, FILM COATED ORAL at 23:21

## 2021-09-04 RX ADMIN — OXYCODONE 10 MG: 5 TABLET ORAL at 01:58

## 2021-09-04 RX ADMIN — SODIUM CHLORIDE: 9 INJECTION, SOLUTION INTRAVENOUS at 16:54

## 2021-09-04 RX ADMIN — ACETAMINOPHEN 1000 MG: 500 TABLET, FILM COATED ORAL at 10:05

## 2021-09-04 RX ADMIN — ACETAMINOPHEN 1000 MG: 500 TABLET, FILM COATED ORAL at 16:46

## 2021-09-04 RX ADMIN — IBUPROFEN 400 MG: 200 TABLET, FILM COATED ORAL at 17:27

## 2021-09-04 RX ADMIN — IBUPROFEN 400 MG: 200 TABLET, FILM COATED ORAL at 12:26

## 2021-09-04 RX ADMIN — DOXAZOSIN 8 MG: 4 TABLET ORAL at 20:21

## 2021-09-04 RX ADMIN — INSULIN LISPRO 2 UNITS: 100 INJECTION, SOLUTION INTRAVENOUS; SUBCUTANEOUS at 13:12

## 2021-09-04 RX ADMIN — SODIUM CHLORIDE, POTASSIUM CHLORIDE, SODIUM LACTATE AND CALCIUM CHLORIDE 1000 ML: 600; 310; 30; 20 INJECTION, SOLUTION INTRAVENOUS at 07:23

## 2021-09-04 RX ADMIN — ACETAMINOPHEN 1000 MG: 500 TABLET, FILM COATED ORAL at 04:08

## 2021-09-04 RX ADMIN — ENOXAPARIN SODIUM 40 MG: 40 INJECTION SUBCUTANEOUS at 07:46

## 2021-09-04 RX ADMIN — CARVEDILOL 25 MG: 25 TABLET, FILM COATED ORAL at 07:46

## 2021-09-04 RX ADMIN — IBUPROFEN 400 MG: 200 TABLET, FILM COATED ORAL at 00:27

## 2021-09-04 ASSESSMENT — PAIN - FUNCTIONAL ASSESSMENT: PAIN_FUNCTIONAL_ASSESSMENT: ACTIVITIES ARE NOT PREVENTED

## 2021-09-04 ASSESSMENT — PAIN SCALES - GENERAL
PAINLEVEL_OUTOF10: 0
PAINLEVEL_OUTOF10: 4
PAINLEVEL_OUTOF10: 7
PAINLEVEL_OUTOF10: 3
PAINLEVEL_OUTOF10: 4
PAINLEVEL_OUTOF10: 4
PAINLEVEL_OUTOF10: 3
PAINLEVEL_OUTOF10: 0
PAINLEVEL_OUTOF10: 5
PAINLEVEL_OUTOF10: 3
PAINLEVEL_OUTOF10: 2

## 2021-09-04 ASSESSMENT — PAIN DESCRIPTION - LOCATION: LOCATION: ABDOMEN

## 2021-09-04 ASSESSMENT — PAIN DESCRIPTION - PROGRESSION: CLINICAL_PROGRESSION: GRADUALLY WORSENING

## 2021-09-04 ASSESSMENT — PAIN DESCRIPTION - DESCRIPTORS: DESCRIPTORS: DISCOMFORT

## 2021-09-04 ASSESSMENT — PAIN DESCRIPTION - FREQUENCY: FREQUENCY: INTERMITTENT

## 2021-09-04 ASSESSMENT — PAIN DESCRIPTION - ONSET: ONSET: GRADUAL

## 2021-09-04 ASSESSMENT — PAIN DESCRIPTION - ORIENTATION: ORIENTATION: MID

## 2021-09-04 ASSESSMENT — PAIN DESCRIPTION - PAIN TYPE: TYPE: SURGICAL PAIN

## 2021-09-04 NOTE — PROGRESS NOTES
Pt has voided since catheter removal. Pt's IV infiltrated before the fluid bolus could finished, residents informed, RN was unable to regain IV access so far but pt has good PO intake for fluids. Pt's previous IV was ultrasound guided, Dr Pedro Luis Beatty said that she would attempt to place the IV later this shift, will continue to monitor.

## 2021-09-04 NOTE — PROGRESS NOTES
Surgery Daily Progress Note      CC: distal transverse colon cancer s/p  laparoscopic distal transverse colectomy    SUBJECTIVE:  Patient rested well overnight. Pain is well controlled. Patient is tolerating diet w/o nausea or vomiting. Patient had a small BM overnight and is passing flatus. Patient with some episodes of HTN treated with PRN Hydralazine; otherwise HDS and afebrile. ROS:   A 14 point review of systems was conducted, significant findings as noted above. All other systems negative. OBJECTIVE:    PHYSICAL EXAM:  Vitals:    09/03/21 1933 09/03/21 2103 09/04/21 0027 09/04/21 0407   BP: (!) 176/87 (!) 164/67 129/61 122/62   Pulse: 106 105 90 81   Resp: 18  16 18   Temp:   99.4 °F (37.4 °C) 98.4 °F (36.9 °C)   TempSrc:   Oral Oral   SpO2: 98%  97% 98%   Weight:       Height:           General appearance: alert, no acute distress, grooming appropriate  Eyes: No scleral icterus, EOM grossly intact  Neck: trachea midline, no JVD, no lymphadenopathy, neck supple  Chest/Lungs: Normal effort with no accessory muscle use on RA  Cardiovascular: RRR, well perfused  Abdomen: Soft, appropriately-tender, incisions c/d/i and well approximated with Dermabond  Skin: warm and dry, no rashes  Extremities: no edema, no cyanosis  Genitourinary:  Copeland in place with clear yellow urine  Neuro: A&Ox3, no focal deficits, sensation intact    ASSESSMENT & PLAN:   This is a 70y.o. year old male status post laparoscopic distal transverse colectomy secondary to distal transverse colon cancer. (9/3) POD1.    - cont general diet  - cont PO pain medications  - remove Copeland today; will be a void check  - SHAI Cr 1.8 from 1.; will give a 1L bolus and recheck Cr this afternoon  - encourage OOB and ambulation  - anticipate discharge later today vs tomorrow    Benjamin Santo DO, PGY-1  09/04/21  6:26 AM  [phone]

## 2021-09-04 NOTE — PLAN OF CARE
Problem: Pain:  Goal: Pain level will decrease  Description: Pain level will decrease  Outcome: Ongoing     Problem: Pain:  Goal: Control of acute pain  Description: Control of acute pain  Outcome: Ongoing     Problem: Falls - Risk of:  Goal: Will remain free from falls  Description: Will remain free from falls  Outcome: Ongoing     Problem: Falls - Risk of:  Goal: Absence of physical injury  Description: Absence of physical injury  Outcome: Ongoing   Pain managed with scheduled and prn meds. No attempt to get out of bed without calling.  Bed alarm on for safety

## 2021-09-05 VITALS
WEIGHT: 275 LBS | TEMPERATURE: 98.7 F | RESPIRATION RATE: 16 BRPM | HEIGHT: 73 IN | BODY MASS INDEX: 36.45 KG/M2 | DIASTOLIC BLOOD PRESSURE: 70 MMHG | HEART RATE: 78 BPM | SYSTOLIC BLOOD PRESSURE: 131 MMHG | OXYGEN SATURATION: 97 %

## 2021-09-05 LAB
ALBUMIN SERPL-MCNC: 3.4 G/DL (ref 3.4–5)
ANION GAP SERPL CALCULATED.3IONS-SCNC: 12 MMOL/L (ref 3–16)
BASOPHILS ABSOLUTE: 0 K/UL (ref 0–0.2)
BASOPHILS RELATIVE PERCENT: 0.2 %
BUN BLDV-MCNC: 19 MG/DL (ref 7–20)
CALCIUM SERPL-MCNC: 8.6 MG/DL (ref 8.3–10.6)
CHLORIDE BLD-SCNC: 107 MMOL/L (ref 99–110)
CO2: 22 MMOL/L (ref 21–32)
CREAT SERPL-MCNC: 1.5 MG/DL (ref 0.8–1.3)
EOSINOPHILS ABSOLUTE: 0.2 K/UL (ref 0–0.6)
EOSINOPHILS RELATIVE PERCENT: 2.6 %
GFR AFRICAN AMERICAN: 56
GFR NON-AFRICAN AMERICAN: 46
GLUCOSE BLD-MCNC: 104 MG/DL (ref 70–99)
GLUCOSE BLD-MCNC: 117 MG/DL (ref 70–99)
GLUCOSE BLD-MCNC: 93 MG/DL (ref 70–99)
HCT VFR BLD CALC: 26 % (ref 40.5–52.5)
HEMOGLOBIN: 8.8 G/DL (ref 13.5–17.5)
LYMPHOCYTES ABSOLUTE: 1.2 K/UL (ref 1–5.1)
LYMPHOCYTES RELATIVE PERCENT: 15.6 %
MAGNESIUM: 2 MG/DL (ref 1.8–2.4)
MCH RBC QN AUTO: 26 PG (ref 26–34)
MCHC RBC AUTO-ENTMCNC: 33.7 G/DL (ref 31–36)
MCV RBC AUTO: 77.3 FL (ref 80–100)
MONOCYTES ABSOLUTE: 0.7 K/UL (ref 0–1.3)
MONOCYTES RELATIVE PERCENT: 9.3 %
NEUTROPHILS ABSOLUTE: 5.8 K/UL (ref 1.7–7.7)
NEUTROPHILS RELATIVE PERCENT: 72.3 %
PDW BLD-RTO: 15.7 % (ref 12.4–15.4)
PERFORMED ON: ABNORMAL
PERFORMED ON: ABNORMAL
PHOSPHORUS: 4.1 MG/DL (ref 2.5–4.9)
PLATELET # BLD: 193 K/UL (ref 135–450)
PMV BLD AUTO: 8.1 FL (ref 5–10.5)
POTASSIUM SERPL-SCNC: 3.6 MMOL/L (ref 3.5–5.1)
RBC # BLD: 3.37 M/UL (ref 4.2–5.9)
SODIUM BLD-SCNC: 141 MMOL/L (ref 136–145)
WBC # BLD: 8 K/UL (ref 4–11)

## 2021-09-05 PROCEDURE — 6370000000 HC RX 637 (ALT 250 FOR IP): Performed by: SURGERY

## 2021-09-05 PROCEDURE — 2580000003 HC RX 258: Performed by: SURGERY

## 2021-09-05 PROCEDURE — 6370000000 HC RX 637 (ALT 250 FOR IP)

## 2021-09-05 PROCEDURE — 83735 ASSAY OF MAGNESIUM: CPT

## 2021-09-05 PROCEDURE — 85025 COMPLETE CBC W/AUTO DIFF WBC: CPT

## 2021-09-05 PROCEDURE — 6360000002 HC RX W HCPCS: Performed by: SURGERY

## 2021-09-05 PROCEDURE — 36415 COLL VENOUS BLD VENIPUNCTURE: CPT

## 2021-09-05 PROCEDURE — 80069 RENAL FUNCTION PANEL: CPT

## 2021-09-05 PROCEDURE — 2580000003 HC RX 258

## 2021-09-05 RX ORDER — 0.9 % SODIUM CHLORIDE 0.9 %
500 INTRAVENOUS SOLUTION INTRAVENOUS ONCE
Status: COMPLETED | OUTPATIENT
Start: 2021-09-05 | End: 2021-09-05

## 2021-09-05 RX ORDER — OXYCODONE HYDROCHLORIDE 5 MG/1
5 TABLET ORAL EVERY 6 HOURS PRN
Qty: 20 TABLET | Refills: 0 | Status: SHIPPED | OUTPATIENT
Start: 2021-09-05 | End: 2021-09-12

## 2021-09-05 RX ORDER — DOCUSATE SODIUM 100 MG/1
100 CAPSULE, LIQUID FILLED ORAL 2 TIMES DAILY
Qty: 30 CAPSULE | Refills: 0 | Status: SHIPPED | OUTPATIENT
Start: 2021-09-05 | End: 2021-09-19

## 2021-09-05 RX ADMIN — SODIUM CHLORIDE: 9 INJECTION, SOLUTION INTRAVENOUS at 06:34

## 2021-09-05 RX ADMIN — ACETAMINOPHEN 1000 MG: 500 TABLET, FILM COATED ORAL at 09:12

## 2021-09-05 RX ADMIN — IBUPROFEN 400 MG: 200 TABLET, FILM COATED ORAL at 06:37

## 2021-09-05 RX ADMIN — ENOXAPARIN SODIUM 40 MG: 40 INJECTION SUBCUTANEOUS at 09:12

## 2021-09-05 RX ADMIN — POTASSIUM BICARBONATE 40 MEQ: 782 TABLET, EFFERVESCENT ORAL at 07:19

## 2021-09-05 RX ADMIN — SODIUM CHLORIDE 500 ML: 9 INJECTION, SOLUTION INTRAVENOUS at 06:38

## 2021-09-05 RX ADMIN — IBUPROFEN 400 MG: 200 TABLET, FILM COATED ORAL at 12:53

## 2021-09-05 RX ADMIN — CARVEDILOL 25 MG: 25 TABLET, FILM COATED ORAL at 09:13

## 2021-09-05 ASSESSMENT — PAIN DESCRIPTION - LOCATION: LOCATION: ABDOMEN

## 2021-09-05 ASSESSMENT — PAIN - FUNCTIONAL ASSESSMENT: PAIN_FUNCTIONAL_ASSESSMENT: ACTIVITIES ARE NOT PREVENTED

## 2021-09-05 ASSESSMENT — PAIN DESCRIPTION - ORIENTATION: ORIENTATION: MID

## 2021-09-05 ASSESSMENT — PAIN SCALES - GENERAL
PAINLEVEL_OUTOF10: 3
PAINLEVEL_OUTOF10: 0
PAINLEVEL_OUTOF10: 4
PAINLEVEL_OUTOF10: 0
PAINLEVEL_OUTOF10: 0

## 2021-09-05 ASSESSMENT — PAIN DESCRIPTION - FREQUENCY: FREQUENCY: INTERMITTENT

## 2021-09-05 ASSESSMENT — PAIN DESCRIPTION - DESCRIPTORS: DESCRIPTORS: DISCOMFORT

## 2021-09-05 ASSESSMENT — PAIN DESCRIPTION - ONSET: ONSET: GRADUAL

## 2021-09-05 ASSESSMENT — PAIN DESCRIPTION - PAIN TYPE: TYPE: SURGICAL PAIN

## 2021-09-05 NOTE — PROGRESS NOTES
Surgery Daily Progress Note      CC: distal transverse colon cancer s/p  laparoscopic distal transverse colectomy    SUBJECTIVE:  Patient rested well overnight. Pain is well controlled. He is tolerating a diet with no nausea or emesis. IV access re-obtained yesterday, give 1L bolus with improved Cr this morning. ROS:   A 14 point review of systems was conducted, significant findings as noted above. All other systems negative.       OBJECTIVE:    PHYSICAL EXAM:  Vitals:    09/04/21 2012 09/04/21 2326 09/04/21 2353 09/05/21 0303   BP: (!) 145/75 (!) 172/70 (!) 163/78 (!) 144/69   Pulse: 87 82  82   Resp: 16 18  18   Temp: 98.6 °F (37 °C) 98.7 °F (37.1 °C)  98.5 °F (36.9 °C)   TempSrc: Oral Oral  Oral   SpO2: 98% 98%  98%   Weight:       Height:           General appearance: alert, no acute distress, grooming appropriate  Eyes: No scleral icterus, EOM grossly intact  Neck: trachea midline, no JVD, no lymphadenopathy, neck supple  Chest/Lungs: Normal effort with no accessory muscle use on RA  Cardiovascular: RRR, well perfused  Abdomen: Soft, appropriately-tender, incisions c/d/i and well approximated with Dermabond  Skin: warm and dry, no rashes  Extremities: no edema, no cyanosis  Neuro: A&Ox3, no focal deficits, sensation intact    ASSESSMENT & PLAN:   This is a 70y.o. year old male status post laparoscopic distal transverse colectomy secondary to distal transverse colon cancer. (9/3) POD2.    - cont general diet  - cont PO pain medications  - SHAI Cr 1.5 from 1.8  - encourage OOB and ambulation  - anticipate discharge later today    Gato Dowd DO, MS  PGY1, General Surgery  09/05/21  6:55 AM  [phone]

## 2021-09-07 ENCOUNTER — CARE COORDINATION (OUTPATIENT)
Dept: CASE MANAGEMENT | Age: 71
End: 2021-09-07

## 2021-09-07 DIAGNOSIS — C18.4 CANCER OF TRANSVERSE COLON (HCC): Primary | ICD-10-CM

## 2021-09-07 PROCEDURE — 1111F DSCHRG MED/CURRENT MED MERGE: CPT | Performed by: INTERNAL MEDICINE

## 2021-09-07 NOTE — CARE COORDINATION
Transitions of Care Initial Call    Was this an external facility discharge? No     Challenges to be reviewed by the provider   Additional needs identified to be addressed with provider: No  none             Method of communication with provider : phone      Advance Care Planning:   Does patient have an Advance Directive: not on file. Was this a readmission? No  Patients top risk factors for readmission: medical condition-Potential Post Op Complications    Care Transition Nurse (CTN) contacted the patient by telephone to perform post hospital discharge assessment. Verified name and  with patient as identifiers. Provided introduction to self, and explanation of the CTN role. CTN reviewed discharge instructions, medical action plan and red flags with patient who verbalized understanding. Patient given an opportunity to ask questions and does not have any further questions or concerns at this time. Were discharge instructions available to patient? Yes. Reviewed appropriate site of care based on symptoms and resources available to patient including: PCP, Specialist and Urgent care clinics. The patient agrees to contact the PCP office for questions related to their healthcare. Medication reconciliation was performed with patient, who verbalizes understanding of administration of home medications. Advised obtaining a 90-day supply of all daily and as-needed medications. Covid Risk Education     Educated patient about risk for severe COVID-19 due to risk factors according to CDC guidelines. CTN reviewed discharge instructions, medical action plan and red flag symptoms with the patient who verbalized understanding. Discussed COVID vaccination status: Yes. Education provided on COVID-19 vaccination as appropriate. Discussed exposure protocols and quarantine with CDC Guidelines.  Patient was given an opportunity to verbalize any questions and concerns and agrees to contact CTN or health care provider for questions related to their healthcare. Reviewed and educated patient on any new and changed medications related to discharge diagnosis. CTN spoke with patient this am for initial 24 hour discharge follow up CTN call. Patient states he is doing well, reporting no complaints of any fever, chills, nausea, vomiting, chest pain, SOB or cough. Patient states his incisions are dry and intact, instructed to continue to monitor for any signs of infection- increased redness, any drainage, odor, warmth to touch. Patient also instructed to continue to monitor for any of the other above s/s, reporting any that may present to MD immediately. CTN and Pt reviewed meds and CTN completed the 1111f. Patient to  PRN pain medication from pharmacy today, states he has not needed it thus far, but will  anyway. CTN provided education on s/s that require medical attention and when to seek medical attention. CTN advised Pt of use urgent care or physicians 24 hr access line if assistance is needed after hours or on the weekend. Pt denies any needs or concerns and is agreeable with additional calls. CTN provided contact information. Plan for follow-up call in 5-7 days based on severity of symptoms and risk factors. Plan for next call: symptom management-Pain, infection.       Thank Minna Wu RN  Care Transition Coordinator  Contact [phone]

## 2021-09-08 DIAGNOSIS — C18.4 CANCER OF TRANSVERSE COLON (HCC): ICD-10-CM

## 2021-09-08 DIAGNOSIS — C18.6 MALIGNANT NEOPLASM OF DESCENDING COLON (HCC): Primary | ICD-10-CM

## 2021-09-08 NOTE — RESULT ENCOUNTER NOTE
Spoke to Roel on the phone. Seems to be recovering very well. Minimal discomfort. Slowly getting back to his regular activities. We did discuss the pathology which showed 3+ lymph nodes and tumor deposits, making this a stage III colon cancer, margins negative. Discussed likely recommendation for adjuvant chemotherapy. I will place a referral to Dr. aMteus العلي, but no urgency at this point as we want him to recover from surgery before starting chemo. I will plan on seeing him back in the office next week Tuesday for a postop wound check.

## 2021-09-14 ENCOUNTER — OFFICE VISIT (OUTPATIENT)
Dept: SURGERY | Age: 71
End: 2021-09-14

## 2021-09-14 ENCOUNTER — CARE COORDINATION (OUTPATIENT)
Dept: CASE MANAGEMENT | Age: 71
End: 2021-09-14

## 2021-09-14 VITALS
SYSTOLIC BLOOD PRESSURE: 149 MMHG | DIASTOLIC BLOOD PRESSURE: 76 MMHG | BODY MASS INDEX: 37.05 KG/M2 | HEIGHT: 73 IN | OXYGEN SATURATION: 99 % | TEMPERATURE: 97.6 F | WEIGHT: 279.6 LBS | HEART RATE: 68 BPM

## 2021-09-14 DIAGNOSIS — C18.4 CANCER OF TRANSVERSE COLON (HCC): Primary | ICD-10-CM

## 2021-09-14 PROCEDURE — 99024 POSTOP FOLLOW-UP VISIT: CPT | Performed by: SURGERY

## 2021-09-14 NOTE — PROGRESS NOTES
[address] [address] [address] [facility]  Dept: [phone]  Dept Fax: [phone]  Loc: [phone]    Visit Date: 9/14/2021    Eufemia Cheatham is a 70 y.o. male who presents today for: Follow-up (Post-op Lap Left Colectomy 9/3/2021)      Subjective:     Eufemia Cheatham is a 70 y.o. male here for postoperative visit after lap partial colectomy about 2 weeks ago. Overall doing very well. Had very brief hospitalization. Very happy with his care at the hospital    Patient's problem list, medications, past medical, surgical, family, and social histories were reviewed and updated in the chart as indicated today. Objective:     BP (!) 149/76 (Site: Right Upper Arm, Position: Sitting, Cuff Size: Medium Adult)   Pulse 68   Temp 97.6 °F (36.4 °C) (Oral)   Ht 6' 1\" (1.854 m)   Wt 279 lb 9.6 oz (126.8 kg)   SpO2 99%   BMI 36.89 kg/m²     Abdominal/wound: Wounds healing well    Assessment/Plan:       ASSESSMENT/PLAN:    Overall doing great 2-week status post lap partial colectomy. Did discuss final pathology which showed 3+ lymph nodes out of 14. Stage III disease. Has an appointment to see Dr. Saba Newby in the coming week. Discussed Port-A-Cath placement if needed. Note completed using dictation software, please excuse any errors. Referring/primary care physician updated through QBE note if PCP was listed.     Electronically signed by Andrea Yuan MD on 9/14/2021 at 12:15 PM

## 2021-09-14 NOTE — CARE COORDINATION
[facility] Transitions Initial Follow Up Call    Call within 2 business days of discharge: Yes    Patient:  Amilcar Shelley   Patient :  1950  MRN:  [id number]     Reason for Admission: Lap, Partial Colectomy   Discharge Date:  21    RARS:       Non-face-to-face services provided:    1ST CTC attempt to reach Pt regarding recent hospital discharge. CTC left voice recording with call back number requesting a call back. Follow up appointments:    No future appointments.     Thank Abad Beth RN  Care Transition Coordinator  Contact UNFRNI:[phone]

## 2021-09-21 ENCOUNTER — TELEPHONE (OUTPATIENT)
Dept: SURGERY | Age: 71
End: 2021-09-21

## 2021-09-21 NOTE — TELEPHONE ENCOUNTER
Patient has been scheduled for:    Procedure: portacath placement   Date: 10/5/21  Time: 7:15AM  Arrival: 5:30AM  [facility]   ASA?: Aspirin 7 days   Prep? NPO    Pre-op? N/A    Post-op Appt? N/A    Patient advised they will need a . Orders faxed to surgery scheduling.     Instructions have been emailed to:  [email or web address]

## 2021-09-22 ENCOUNTER — CARE COORDINATION (OUTPATIENT)
Dept: CASE MANAGEMENT | Age: 71
End: 2021-09-22

## 2021-09-22 NOTE — CARE COORDINATION
[facility] Transitions Initial Follow Up Call    Call within 2 business days of discharge: Yes    Patient:  Carleen Jaimes   Patient :  1950  MRN:  [id number]     Reason for Admission: S/P Lap, Partial Colectomy  Discharge Date:  21    RARS:       Non-face-to-face services provided:    2ND CTC attempt to reach Pt regarding recent hospital discharge. CTC left voice recording with call back number requesting a call back. CTN will close out CTN episode at this time. Follow up appointments:    No future appointments.     Thank Gemini Schwarz RN  Care Transition Coordinator  Contact LIEDMY:[phone]

## 2021-09-22 NOTE — CARE COORDINATION
[facility] Transitions Follow Up Call    2021    Patient: Ruma Hudson  Patient : 1950   MRN: [id number]   Reason for Admission: S/P Lap Partial Colectomy  Discharge Date: 21 RARS: Readmission Risk Score: 11         Spoke with: Juan Jose Bashir Transitions Subsequent and Final Call    Schedule Follow Up Appointment with PCP: Declined  Subsequent and Final Calls  Do you have any ongoing symptoms?: No  Have your medications changed?: No  Do you have any questions related to your medications?: No  Do you currently have any active services?: No  Do you have any needs or concerns that I can assist you with?: No  Identified Barriers: None  Care Transitions Interventions  No Identified Needs  Other Interventions:         Summary  CTN received return phone call from patient this afternoon. Patient states he is doing well, has had some appointments with MD's, treatments given and feels pretty good. No other issues or concerns at this time. Please follow up with MD's with any concerns. CTN provided education on s/s that require medical attention and when to seek medical attention. CTN advised Pt of use urgent care or physicians 24 hr access line if assistance is needed after hours or on the weekend. Follow Up  No future appointments.     Renzo Thurman RN

## 2021-09-27 ENCOUNTER — TELEPHONE (OUTPATIENT)
Dept: SURGERY | Age: 71
End: 2021-09-27

## 2021-09-27 NOTE — TELEPHONE ENCOUNTER
Patient would like to know if he should start his chemo on 10/6/2021    He is having his port placed on 10/05/2021    Please contact the patient at [phone]

## 2021-10-04 ENCOUNTER — ANESTHESIA EVENT (OUTPATIENT)
Dept: OPERATING ROOM | Age: 71
End: 2021-10-04
Payer: MEDICARE

## 2021-10-05 ENCOUNTER — ANESTHESIA (OUTPATIENT)
Dept: OPERATING ROOM | Age: 71
End: 2021-10-05
Payer: MEDICARE

## 2021-10-05 ENCOUNTER — APPOINTMENT (OUTPATIENT)
Dept: GENERAL RADIOLOGY | Age: 71
End: 2021-10-05
Attending: SURGERY
Payer: MEDICARE

## 2021-10-05 ENCOUNTER — HOSPITAL ENCOUNTER (OUTPATIENT)
Age: 71
Setting detail: OUTPATIENT SURGERY
Discharge: HOME | End: 2021-10-05
Attending: SURGERY | Admitting: SURGERY
Payer: MEDICARE

## 2021-10-05 VITALS
SYSTOLIC BLOOD PRESSURE: 115 MMHG | RESPIRATION RATE: 10 BRPM | OXYGEN SATURATION: 100 % | DIASTOLIC BLOOD PRESSURE: 60 MMHG

## 2021-10-05 VITALS
OXYGEN SATURATION: 98 % | RESPIRATION RATE: 15 BRPM | BODY MASS INDEX: 37.77 KG/M2 | WEIGHT: 285 LBS | SYSTOLIC BLOOD PRESSURE: 138 MMHG | HEART RATE: 62 BPM | HEIGHT: 73 IN | TEMPERATURE: 96.2 F | DIASTOLIC BLOOD PRESSURE: 81 MMHG

## 2021-10-05 DIAGNOSIS — G89.18 POST-OP PAIN: Primary | ICD-10-CM

## 2021-10-05 LAB
GLUCOSE BLD-MCNC: 102 MG/DL (ref 70–99)
GLUCOSE BLD-MCNC: 112 MG/DL (ref 70–99)
PERFORMED ON: ABNORMAL
PERFORMED ON: ABNORMAL

## 2021-10-05 PROCEDURE — 6360000002 HC RX W HCPCS

## 2021-10-05 PROCEDURE — 77001 FLUOROGUIDE FOR VEIN DEVICE: CPT

## 2021-10-05 PROCEDURE — 2709999900 HC NON-CHARGEABLE SUPPLY: Performed by: SURGERY

## 2021-10-05 PROCEDURE — 2580000003 HC RX 258: Performed by: NURSE ANESTHETIST, CERTIFIED REGISTERED

## 2021-10-05 PROCEDURE — 71045 X-RAY EXAM CHEST 1 VIEW: CPT

## 2021-10-05 PROCEDURE — 7100000010 HC PHASE II RECOVERY - FIRST 15 MIN: Performed by: SURGERY

## 2021-10-05 PROCEDURE — 7100000000 HC PACU RECOVERY - FIRST 15 MIN: Performed by: SURGERY

## 2021-10-05 PROCEDURE — 7100000001 HC PACU RECOVERY - ADDTL 15 MIN: Performed by: SURGERY

## 2021-10-05 PROCEDURE — 6360000002 HC RX W HCPCS: Performed by: NURSE ANESTHETIST, CERTIFIED REGISTERED

## 2021-10-05 PROCEDURE — 3600000012 HC SURGERY LEVEL 2 ADDTL 15MIN: Performed by: SURGERY

## 2021-10-05 PROCEDURE — 3700000000 HC ANESTHESIA ATTENDED CARE: Performed by: SURGERY

## 2021-10-05 PROCEDURE — 2580000003 HC RX 258: Performed by: ANESTHESIOLOGY

## 2021-10-05 PROCEDURE — 6360000002 HC RX W HCPCS: Performed by: SURGERY

## 2021-10-05 PROCEDURE — 77001 FLUOROGUIDE FOR VEIN DEVICE: CPT | Performed by: SURGERY

## 2021-10-05 PROCEDURE — C1788 PORT, INDWELLING, IMP: HCPCS | Performed by: SURGERY

## 2021-10-05 PROCEDURE — 2500000003 HC RX 250 WO HCPCS: Performed by: SURGERY

## 2021-10-05 PROCEDURE — 3700000001 HC ADD 15 MINUTES (ANESTHESIA): Performed by: SURGERY

## 2021-10-05 PROCEDURE — 36561 INSERT TUNNELED CV CATH: CPT | Performed by: SURGERY

## 2021-10-05 PROCEDURE — 3600000002 HC SURGERY LEVEL 2 BASE: Performed by: SURGERY

## 2021-10-05 PROCEDURE — 7100000011 HC PHASE II RECOVERY - ADDTL 15 MIN: Performed by: SURGERY

## 2021-10-05 DEVICE — PORT INFUS SGL LUMN ATTCH POLYUR OPN END CATH 8FR POWERPRT: Type: IMPLANTABLE DEVICE | Status: FUNCTIONAL

## 2021-10-05 RX ORDER — OXYCODONE HYDROCHLORIDE AND ACETAMINOPHEN 5; 325 MG/1; MG/1
1 TABLET ORAL EVERY 6 HOURS PRN
Qty: 8 TABLET | Refills: 0 | Status: SHIPPED | OUTPATIENT
Start: 2021-10-05 | End: 2021-10-07

## 2021-10-05 RX ORDER — SODIUM CHLORIDE 0.9 % (FLUSH) 0.9 %
10 SYRINGE (ML) INJECTION EVERY 12 HOURS SCHEDULED
Status: DISCONTINUED | OUTPATIENT
Start: 2021-10-05 | End: 2021-10-05 | Stop reason: HOSPADM

## 2021-10-05 RX ORDER — SODIUM CHLORIDE, SODIUM LACTATE, POTASSIUM CHLORIDE, CALCIUM CHLORIDE 600; 310; 30; 20 MG/100ML; MG/100ML; MG/100ML; MG/100ML
INJECTION, SOLUTION INTRAVENOUS CONTINUOUS
Status: DISCONTINUED | OUTPATIENT
Start: 2021-10-05 | End: 2021-10-05 | Stop reason: HOSPADM

## 2021-10-05 RX ORDER — SODIUM CHLORIDE 9 MG/ML
25 INJECTION, SOLUTION INTRAVENOUS PRN
Status: DISCONTINUED | OUTPATIENT
Start: 2021-10-05 | End: 2021-10-05 | Stop reason: HOSPADM

## 2021-10-05 RX ORDER — ONDANSETRON 2 MG/ML
INJECTION INTRAMUSCULAR; INTRAVENOUS PRN
Status: DISCONTINUED | OUTPATIENT
Start: 2021-10-05 | End: 2021-10-05 | Stop reason: SDUPTHER

## 2021-10-05 RX ORDER — HEPARIN SODIUM (PORCINE) LOCK FLUSH IV SOLN 100 UNIT/ML 100 UNIT/ML
SOLUTION INTRAVENOUS PRN
Status: DISCONTINUED | OUTPATIENT
Start: 2021-10-05 | End: 2021-10-05 | Stop reason: ALTCHOICE

## 2021-10-05 RX ORDER — ONDANSETRON 2 MG/ML
4 INJECTION INTRAMUSCULAR; INTRAVENOUS
Status: DISCONTINUED | OUTPATIENT
Start: 2021-10-05 | End: 2021-10-05 | Stop reason: HOSPADM

## 2021-10-05 RX ORDER — DOCUSATE SODIUM 100 MG/1
100 CAPSULE, LIQUID FILLED ORAL 2 TIMES DAILY PRN
Qty: 4 CAPSULE | Refills: 0 | Status: SHIPPED | OUTPATIENT
Start: 2021-10-05 | End: 2021-10-07

## 2021-10-05 RX ORDER — SODIUM CHLORIDE, SODIUM LACTATE, POTASSIUM CHLORIDE, CALCIUM CHLORIDE 600; 310; 30; 20 MG/100ML; MG/100ML; MG/100ML; MG/100ML
INJECTION, SOLUTION INTRAVENOUS CONTINUOUS PRN
Status: DISCONTINUED | OUTPATIENT
Start: 2021-10-05 | End: 2021-10-05 | Stop reason: SDUPTHER

## 2021-10-05 RX ORDER — SODIUM CHLORIDE 0.9 % (FLUSH) 0.9 %
10 SYRINGE (ML) INJECTION PRN
Status: DISCONTINUED | OUTPATIENT
Start: 2021-10-05 | End: 2021-10-05 | Stop reason: HOSPADM

## 2021-10-05 RX ORDER — LABETALOL HYDROCHLORIDE 5 MG/ML
5 INJECTION, SOLUTION INTRAVENOUS EVERY 10 MIN PRN
Status: DISCONTINUED | OUTPATIENT
Start: 2021-10-05 | End: 2021-10-05 | Stop reason: HOSPADM

## 2021-10-05 RX ORDER — LIDOCAINE HYDROCHLORIDE 10 MG/ML
1 INJECTION, SOLUTION EPIDURAL; INFILTRATION; INTRACAUDAL; PERINEURAL
Status: DISCONTINUED | OUTPATIENT
Start: 2021-10-05 | End: 2021-10-05 | Stop reason: HOSPADM

## 2021-10-05 RX ORDER — BUPIVACAINE HYDROCHLORIDE 5 MG/ML
INJECTION, SOLUTION EPIDURAL; INTRACAUDAL PRN
Status: DISCONTINUED | OUTPATIENT
Start: 2021-10-05 | End: 2021-10-05 | Stop reason: ALTCHOICE

## 2021-10-05 RX ORDER — FENTANYL CITRATE 50 UG/ML
50 INJECTION, SOLUTION INTRAMUSCULAR; INTRAVENOUS EVERY 5 MIN PRN
Status: DISCONTINUED | OUTPATIENT
Start: 2021-10-05 | End: 2021-10-05 | Stop reason: HOSPADM

## 2021-10-05 RX ORDER — PROPOFOL 10 MG/ML
INJECTION, EMULSION INTRAVENOUS CONTINUOUS PRN
Status: DISCONTINUED | OUTPATIENT
Start: 2021-10-05 | End: 2021-10-05 | Stop reason: SDUPTHER

## 2021-10-05 RX ORDER — ENALAPRILAT 2.5 MG/2ML
1.25 INJECTION INTRAVENOUS
Status: DISCONTINUED | OUTPATIENT
Start: 2021-10-05 | End: 2021-10-05 | Stop reason: HOSPADM

## 2021-10-05 RX ORDER — FENTANYL CITRATE 50 UG/ML
25 INJECTION, SOLUTION INTRAMUSCULAR; INTRAVENOUS EVERY 5 MIN PRN
Status: DISCONTINUED | OUTPATIENT
Start: 2021-10-05 | End: 2021-10-05 | Stop reason: HOSPADM

## 2021-10-05 RX ORDER — HYDRALAZINE HYDROCHLORIDE 20 MG/ML
5 INJECTION INTRAMUSCULAR; INTRAVENOUS EVERY 5 MIN PRN
Status: DISCONTINUED | OUTPATIENT
Start: 2021-10-05 | End: 2021-10-05 | Stop reason: HOSPADM

## 2021-10-05 RX ADMIN — HYDROMORPHONE HYDROCHLORIDE 0.25 MG: 1 INJECTION, SOLUTION INTRAMUSCULAR; INTRAVENOUS; SUBCUTANEOUS at 08:37

## 2021-10-05 RX ADMIN — SODIUM CHLORIDE, POTASSIUM CHLORIDE, SODIUM LACTATE AND CALCIUM CHLORIDE: 600; 310; 30; 20 INJECTION, SOLUTION INTRAVENOUS at 07:12

## 2021-10-05 RX ADMIN — PROPOFOL 120 MCG/KG/MIN: 10 INJECTION, EMULSION INTRAVENOUS at 07:32

## 2021-10-05 RX ADMIN — SODIUM CHLORIDE, SODIUM LACTATE, POTASSIUM CHLORIDE, AND CALCIUM CHLORIDE: .6; .31; .03; .02 INJECTION, SOLUTION INTRAVENOUS at 07:25

## 2021-10-05 RX ADMIN — ONDANSETRON 4 MG: 2 INJECTION INTRAMUSCULAR; INTRAVENOUS at 08:02

## 2021-10-05 RX ADMIN — CEFAZOLIN 2000 MG: 10 INJECTION, POWDER, FOR SOLUTION INTRAVENOUS at 07:25

## 2021-10-05 RX ADMIN — Medication 0.25 MG: at 08:37

## 2021-10-05 ASSESSMENT — PULMONARY FUNCTION TESTS
PIF_VALUE: 1

## 2021-10-05 ASSESSMENT — PAIN SCALES - GENERAL
PAINLEVEL_OUTOF10: 0
PAINLEVEL_OUTOF10: 2
PAINLEVEL_OUTOF10: 5
PAINLEVEL_OUTOF10: 4

## 2021-10-05 ASSESSMENT — PAIN DESCRIPTION - ORIENTATION: ORIENTATION: LEFT;UPPER

## 2021-10-05 ASSESSMENT — PAIN DESCRIPTION - DESCRIPTORS: DESCRIPTORS: BURNING;SHARP

## 2021-10-05 ASSESSMENT — PAIN DESCRIPTION - FREQUENCY: FREQUENCY: CONTINUOUS

## 2021-10-05 ASSESSMENT — PAIN - FUNCTIONAL ASSESSMENT: PAIN_FUNCTIONAL_ASSESSMENT: 0-10

## 2021-10-05 ASSESSMENT — PAIN DESCRIPTION - LOCATION: LOCATION: CHEST

## 2021-10-05 ASSESSMENT — PAIN DESCRIPTION - PAIN TYPE: TYPE: ACUTE PAIN

## 2021-10-05 NOTE — BRIEF OP NOTE
Brief Postoperative Note      Patient: Elio Koch  YOB: 1950  MRN: [id number]    Date of Procedure: 10/5/2021    Pre-Op Diagnosis: Adenocarcinoma of colon ([address] [C18.9]    Post-Op Diagnosis: Same       Procedure(s):  PORT-A-CATH PLACEMENT    Surgeon(s):  Ugo Bruner MD    Assistant:  Resident: Dinh Harvey DO    Anesthesia: Monitor Anesthesia Care    Estimated Blood Loss (mL): Minimal    Complications: None    Specimens:   * No specimens in log *    Implants:  Implant Name Type Inv.  Item Serial No.  Lot No. LRB No. Used Action   PORT INFUS SGL LUMN ATTCH POLYUR OPN END CATH 8FR POWERPRT  PORT INFUS SGL LUMN ATTCH POLYUR OPN END CATH 8FR POWERPRT  [employer] [id number] N/A 1 Implanted         Drains:   [REMOVED] Urethral Catheter Non-latex;Straight-tip 16 fr (Removed)   Catheter Indications Perioperative use for selected surgical procedures 09/04/21 0743   Site Assessment No urethral drainage 09/04/21 0743   Urine Color Yellow 09/04/21 0743   Urine Appearance Clear 09/04/21 0743   Output (mL) 200 mL 09/04/21 0743       Findings: placement of R subclavian port-a-cath    Electronically signed by Darling Singleton DO on 10/5/2021 at 1:31 PM

## 2021-10-05 NOTE — ANESTHESIA PRE PROCEDURE
Department of Anesthesiology  Preprocedure Note       Name:  Kimberly Plascencia   Age:  70 y.o.  :  1950                                          MRN:  [id number]         Date:  10/5/2021      Surgeon: Maurisio Gutierres):  Shanna Nick MD    Procedure: Procedure(s):  PORT-A-CATH PLACEMENT    Medications prior to admission:   Prior to Admission medications    Medication Sig Start Date End Date Taking? Authorizing Provider   carvedilol (COREG) 25 MG tablet 1 po bid 21  Yes Serina Johnson MD   atorvastatin (LIPITOR) 20 MG tablet TAKE 1 TABLET BY MOUTH EVERY DAY 21  Yes Serina Johnson MD   amLODIPine (NORVASC) 10 MG tablet TAKE 1 TABLET BY MOUTH EVERY DAY 21  Yes Serina Johnson MD   cloNIDine (CATAPRES) 0.1 MG tablet 1 po daily at night 21  Yes Serina Johnson MD   doxazosin (CARDURA) 8 MG tablet TAKE 1 TABLET BY MOUTH EVERY NIGHT 21  Yes Serina Johnson MD   valsartan-hydroCHLOROthiazide (DIOVAN-HCT) 320-12.5 MG per tablet Take 1 tablet by mouth daily 21  Yes Serina Johnson MD   [address] Test blood sugar twice daily. 14  Yes Historical Provider, MD   travoprost, benzalkonium, (TRAVATAN) 0.004 % ophthalmic solution Place 1 drop into both eyes nightly. Yes Historical Provider, MD   glucose blood VI test strips (ASCENSIA AUTODISC VI;ONE TOUCH ULTRA TEST VI) strip 1 each by In Vitro route 2 times daily. As needed. 14  Yes Rosey Albarran DO   Blood Glucose Monitoring Suppl (BLOOD GLUCOSE METER) KIT 1 Device by Does not apply route 2 times daily. 14  Yes Rosey Albarran DO   therapeutic multivitamin-minerals [facility]) tablet Take 1 tablet by mouth daily.    Yes Historical Provider, MD   hydrALAZINE (APRESOLINE) 25 MG tablet Take 1 tablet by mouth every 8 hours 21   Yasmin Keller, APRN - CNP   sildenafil (VIAGRA) 50 MG tablet Take 1 tablet by mouth daily as needed for Erectile Dysfunction 20   Lei Alejo MD colon ([address] C18.6    Cancer of transverse colon ([address] C18.4    Adenocarcinoma of colon ([address] C18.9       Past Medical History:        Diagnosis Date    CAD (coronary artery disease) 07/09/2004    Colon cancer ([address]     Coronary artery disease involving native coronary artery of native heart without angina pectoris     2004 cath > Taxus stents distal/prox RCA 2006 Myoview> EF 69%, small inferior fixed defect distal RCA     Diabetes mellitus with microalbuminuric diabetic nephropathy ([address] 05/01/2015    157    Diverticulosis of colon 08/31/2018    moderate diffuse    DM type 2 (diabetes mellitus, type 2) ([address] 07/07/2014    ED (erectile dysfunction)     Epistaxis 01/01/2012    cautry    Homocysteinemia 09/24/2014    13    HTN (hypertension), benign 01/01/2001    moderate concentric left ventricular hypertrophy 2/16/17    Hyperlipidemia LDL goal < 70 07/12/2004    Low testosterone 05/17/2013    204    [facility] infarction) [facility] 07/09/2004    with 2 stents RCA prox and distal    Neurofibromatosis ([address]     right leg lateral neurofibroma    ANTONIETTA (obstructive sleep apnea) 06/19/2013    CPAP      PSA elevation 01/01/2000    No Bx    Sensorineural hearing loss, bilateral 07/03/2013    right > left    Vitamin D deficiency 01/27/2014    26       Past Surgical History:        Procedure Laterality Date    CARDIAC SURGERY  2004    STENTS    COLECTOMY N/A 9/3/2021    LAPAROSCOPIC PARTIAL COLECTOMY; LAPAROSCOPIC MOBILIZATION OF SPLENIC FLEXURE performed by Td Trammell MD at [address] [place] [address]  01/01/2006    NO POLYPS    COLONOSCOPY N/A 08/31/2018    Tubular adenoma. Every 5 year colonoscopy. Rectal bleeding for 4 days postop.     COLONOSCOPY N/A 8/16/2021    COLONOSCOPY POLYPECTOMY SNARE/COLD BIOPSY performed by Minal Perdue MD at [place] [address]  8/16/2021    COLONOSCOPY WITH BIOPSY performed by Minal Perdue MD at [place] [address]  8/16/2021 COLONOSCOPY SUBMUCOSAL SPOT INJECTION performed by Simeon Lock MD at [facility]  2004    2 stents in RCA    LEG SURGERY Left ~    [facility]  Lat calf & lat     NASAL HEMORRHAGE CONTROL  2012       Social History:    Social History     Tobacco Use    Smoking status: Former Smoker     Packs/day: 0.00     Years: 30.00     Pack years: 0.00     Types: Pipe     Quit date: 1976     Years since quittin.7    Smokeless tobacco: Never Used    Tobacco comment: 30-33 year 2 bowls /day   Substance Use Topics    Alcohol use: Yes     Alcohol/week: 0.0 standard drinks     Comment: Wine occasionally ie wedding/business event. Was drinking mid 34's 9-8 scotch 1 yr. Counseling given: Not Answered  Comment: 30-33 year 2 bowls /day      Vital Signs (Current):   Vitals:    10/05/21 0546   BP: (!) 151/85   Pulse: 70   Resp: 15   Temp: 97.5 °F (36.4 °C)   TempSrc: Temporal   SpO2: 99%   Weight: 285 lb (129.3 kg)   Height: 6' 1\" (1.854 m)                                              BP Readings from Last 3 Encounters:   10/05/21 (!) 151/85   21 (!) 149/76   21 131/70       NPO Status: Time of last liquid consumption: 2100                        Time of last solid consumption: 1800                        Date of last liquid consumption: 10/04/21                        Date of last solid food consumption: 10/04/21    BMI:   Wt Readings from Last 3 Encounters:   10/05/21 285 lb (129.3 kg)   21 279 lb 9.6 oz (126.8 kg)   21 275 lb (124.7 kg)     Body mass index is 37.6 kg/m².     CBC:   Lab Results   Component Value Date    WBC 8.0 2021    RBC 3.37 2021    HGB 8.8 2021    HCT 26.0 2021    MCV 77.3 2021    RDW 15.7 2021     2021       CMP:   Lab Results   Component Value Date     2021    K 3.6 2021    K 3.7 2021     2021    CO2 22 09/05/2021    BUN 19 09/05/2021    CREATININE 1.5 09/05/2021    GFRAA 56 09/05/2021    GFRAA >60 07/19/2012    AGRATIO 1.6 08/17/2021    LABGLOM 46 09/05/2021    GLUCOSE 93 09/05/2021    PROT 5.8 08/17/2021    CALCIUM 8.6 09/05/2021    BILITOT 0.5 08/17/2021    ALKPHOS 37 08/17/2021    AST 15 08/17/2021    ALT 13 08/17/2021       POC Tests: No results for input(s): POCGLU, POCNA, POCK, POCCL, POCBUN, POCHEMO, POCHCT in the last 72 hours. Coags:   Lab Results   Component Value Date    PROTIME 13.5 08/14/2021    PROTIME 12.1 12/13/2009    INR 1.19 08/14/2021    APTT 26.5 08/14/2021       HCG (If Applicable): No results found for: PREGTESTUR, PREGSERUM, HCG, HCGQUANT     ABGs: No results found for: PHART, PO2ART, RGD6EKY, ZWX6FCI, BEART, E3JXXMZB     Type & Screen (If Applicable):  No results found for: LABABO, LABRH    Drug/Infectious Status (If Applicable):  No results found for: HIV, HEPCAB    COVID-19 Screening (If Applicable): No results found for: COVID19        Anesthesia Evaluation  Patient summary reviewed no history of anesthetic complications:   Airway: Mallampati: IV  TM distance: >3 FB   Neck ROM: full  Mouth opening: > = 3 FB Dental:    (+) partials      Pulmonary:   (+) sleep apnea:                             Cardiovascular:    (+) hypertension:, past MI:, CAD:,                ROS comment: 2004 MI and 2 stents placed      Neuro/Psych:                ROS comment: [place] [facility]/Hepatic/Renal:            ROS comment: Colon Ca. Endo/Other:        Diabetes: diet controlled DM. ROS comment: Neurofibromatosis no problems recently  Abdominal:             Vascular: Other Findings:             Anesthesia Plan      MAC and general     ASA 3       Induction: intravenous. Anesthetic plan and risks discussed with patient.                       Ze Gongora MD   10/5/2021

## 2021-10-05 NOTE — OP NOTE
OPERATIVE NOTE     Patient: Brandon Browning  : 1950  MRN: [id number]     PREOPERATIVE DIAGNOSIS: Colon cancer with lymph node metastasis     POSTOPERATIVE DIAGNOSIS: Same     PROCEDURE: Left subclavian tunneled single lumen Port-A-Cath insertion with fluoroscopic guidance     SURGEON: Brandy Osuna MD    ANESTHESIA: MAC + Local     ESTIMATED BLOOD LOSS: Minimal     COMPLICATIONS: None    INDICATIONS: Port-A-Cath placement for chemotherapy as recommended by patient's oncologist.    Risks, benefits, and alternatives discussed with patient and any available family members in the preoperative area. Risks including but not limited to: bleeding, infection, hematoma, major vessel injury, malposition, need for re-operation or removal, and pneumothorax were discussed. All questions answered and patient consented to proceed. PROCEDURE DETAILS:  The patient was brought to the operating theater and placed in the supine position with arms padded and tucked at sides. A towel roll was placed between the scapulae. The patient's bilateral upper chest and neck was then prepped and draped in sterile fashion using chlorhexidine solution. A time-out was performed confirming the patient's identity and operative site. Antibiotics were confirmed to be infusing. SCDs were on and functioning. All safety points were followed per hospital protocol. Sedation was started by anesthesia provider. Patient was placed in Trendelenburg position. The left infraclavicular fossa was anesthetized with local anesthetic. The left subclavian vein was entered on the 2nd attempt with return of venous blood. Guidewire was then positioned in the vena cava. This was confirmed using fluoroscopy. 4 cm pocket for the port was planned for the anterior chest wall 2-3 cm below the guidewire insertion site. The skin and tunnel tract were anesthetized with local anesthetic. Incision was then made using a 11-blade scalpel.  Electrocautery was then used to deepen the incision through subcutaneous tissue and a pocket was created in the subcutaneous tissue superficial to the fascia. Next, 11-blade scalpel was used to extend the guidewire exit site to a 1 cm incision. The catheter was then tunneled subcutaneously to the exit site of the guidewire. The dilator and sheath were placed over the guidewire in the vena cava under fluoroscopic guidance using Seldinger technique. The dilator and wire were subsequently removed. The catheter was then threaded through the sheath into the vena cava. The tip was positioned close to the cavoatrial junction, as confirmed using fluoroscopy. The catheter was cut to appropriate length; the port/catheter locking mechanism was placed onto the catheter and the catheter was secured to the port. The port was then positioned in the pocket. The port was then accessed. Good return of venous blood was noted and flushed easily. Wounds were inspected. Good hemostasis was noted. Following this, the wounds were then closed using interrupted 3-0 Vicryl and running 4-0 monocril sutures. The wounds were then cleaned, dried and dressed with surgical glue. All counts were correct at the end of the procedure. The patient tolerated the procedure well and was taken to the recovery room in stable condition. Portable chest radiograph ordered for PACU.

## 2021-10-05 NOTE — ANESTHESIA POSTPROCEDURE EVALUATION
Department of Anesthesiology  Postprocedure Note    Patient: Bee Jackson  MRN: [id number]  [place]: 1950  Date of evaluation: 10/5/2021  Time:  12:53 PM     Procedure Summary     Date: 10/05/21 Room / Location: [address] [place] [address] [address] [place] [address] / [facility]    Anesthesia Start: 0725 Anesthesia Stop: 9956    Procedure: PORT-A-CATH PLACEMENT (N/A ) Diagnosis:       Adenocarcinoma of colon ([address]      (Adenocarcinoma of colon ([address] [C18.9])    Surgeons: Mariano Wheeler MD Responsible Provider: Iram Fernandez MD    Anesthesia Type: MAC, general ASA Status: 3          Anesthesia Type: MAC, general    Chad Phase I: Chad Score: 10    Chad Phase II: Chad Score: 10    Last vitals: Reviewed and per EMR flowsheets.        Anesthesia Post Evaluation    Patient participation: complete - patient participated  Level of consciousness: awake  Airway patency: patent  Nausea & Vomiting: no nausea and no vomiting  Complications: no  Cardiovascular status: hemodynamically stable  Respiratory status: acceptable  Hydration status: stable

## 2021-10-05 NOTE — H&P
Transportation Needs:     Lack of Transportation (Medical):  Lack of Transportation (Non-Medical):    Physical Activity:     Days of Exercise per Week:     Minutes of Exercise per Session:    Stress:     Feeling of Stress :    Social Connections:     Frequency of Communication with Friends and Family:     Frequency of Social Gatherings with Friends and Family:     Attends Zoroastrianism Services:     Active Member of Clubs or Organizations:     Attends Club or Organization Meetings:     Marital Status:    Intimate Partner Violence:     Fear of Current or Ex-Partner:     Emotionally Abused:     Physically Abused:     Sexually Abused:          Medications Prior to Admission:      Prior to Admission medications    Medication Sig Start Date End Date Taking? Authorizing Provider   carvedilol (COREG) 25 MG tablet 1 po bid 8/31/21  Yes Tyler Wheeler MD   atorvastatin (LIPITOR) 20 MG tablet TAKE 1 TABLET BY MOUTH EVERY DAY 8/31/21  Yes Tyler Wheeler MD   amLODIPine (NORVASC) 10 MG tablet TAKE 1 TABLET BY MOUTH EVERY DAY 8/31/21  Yes Tyler Wheeler MD   cloNIDine (CATAPRES) 0.1 MG tablet 1 po daily at night 8/31/21  Yes Tyler Wheeler MD   doxazosin (CARDURA) 8 MG tablet TAKE 1 TABLET BY MOUTH EVERY NIGHT 8/31/21  Yes Tyler Wheeler MD   valsartan-hydroCHLOROthiazide (DIOVAN-HCT) 320-12.5 MG per tablet Take 1 tablet by mouth daily 8/31/21  Yes Tyler Wheeler MD   [facility] LANCETS 30V MISC Test blood sugar twice daily. 8/18/14  Yes Historical Provider, MD   travoprost, benzalkonium, (TRAVATAN) 0.004 % ophthalmic solution Place 1 drop into both eyes nightly. Yes Historical Provider, MD   glucose blood VI test strips (ASCENSIA AUTODISC VI;ONE TOUCH ULTRA TEST VI) strip 1 each by In Vitro route 2 times daily. As needed. 8/18/14  Yes Emerson Hung DO   Blood Glucose Monitoring Suppl (BLOOD GLUCOSE METER) KIT 1 Device by Does not apply route 2 times daily.  7/7/14  Yes Emerson Hung DO therapeutic multivitamin-minerals (THERAGRAN-M) tablet Take 1 tablet by mouth daily. Yes Historical Provider, MD   hydrALAZINE (APRESOLINE) 25 MG tablet Take 1 tablet by mouth every 8 hours 8/18/21   MACHO Davis CNP   sildenafil (VIAGRA) 50 MG tablet Take 1 tablet by mouth daily as needed for Erectile Dysfunction 8/25/20   Dennard Denver, MD   aspirin 81 MG tablet Take 81 mg by mouth daily. Historical Provider, MD         Allergies:  Patient has no known allergies. PHYSICAL EXAM:      BP (!) 151/85   Pulse 70   Temp 97.5 °F (36.4 °C) (Temporal)   Resp 15   Ht 6' 1\" (1.854 m)   Wt 285 lb (129.3 kg)   SpO2 99%   BMI 37.60 kg/m²      Airway:  Airway patent with no audible stridor    Heart:  Regular rate and rhythm, No murmur noted    Lungs:  No increased work of breathing, good air exchange, clear to auscultation bilaterally, no crackles or wheezing    Abdomen:  Soft, non-distended, non-tender, no rebound tenderness or guarding, and no masses palpated    ASSESSMENT AND PLAN     Patient is a 70 y.o. male with above specified procedure planned. 1.  The patients history and physical was obtained and signed off by the pre-admission testing department. Patient seen and focused exam done today- no new changes since last physical exam on 9/20/21    2. Access to ancillary services are available per request of the provider.     MACHO Agustin CNP     10/5/2021

## 2021-10-05 NOTE — PROGRESS NOTES
10/62872-  CBC FROM 9/5 ROUTED TO DR Latrice Flores AND DR Lee Rice, NO ORDERS-MP
9/30/21 @ [facility] call fabiana on pt voicemail.  Imelda Schmidt
Dr. Bob Tripp in department to see pt and made aware of delay. While physician in with pt, pt requesting that we attempt to start IV. Will assess for IV access.
PACU Transfer to [facility]    Vitals:    10/05/21 0930   BP: (!) 141/85   Pulse: 61   Resp: 10   Temp: 98 °F (36.7 °C)   SpO2: 99%     BP meets Phase one criteria for DC     Intake/Output Summary (Last 24 hours) at 10/5/2021 0957  Last data filed at 10/5/2021 0930  Gross per 24 hour   Intake 555 ml   Output --   Net 555 ml       Pain assessment:  present - adequately treated  Pain Level: 2    Patient transferred to care of MARCELLUS RN.    10/5/2021 9:57 AM
Pt reports that he is an extremely difficult IV stick. He reports that they have always had to use an ultrasound to access and have even had difficulty when using the ultrasound. Dr. Peterson, anesthesia, made aware. Dr. Peterson requesting that this writer notify PICC team. Message left for PICC team who arrives at 0730. Pt and wife made aware of delay.
arrival for surgery. NOTE: ALL Gastric, Bariatric and Bowel surgery patients MUST follow their surgeon's instructions. 10. No gum, candy, mints, or ice chips day of procedure. 11. Please refrain from drinking alcohol the day before or day of your procedure. 12. Please do not smoke the day of your procedure. 13. Dress in loose, comfortable clothing appropriate for redressing after your procedure. Do not wear jewelry (including body piercings), make-up, fingernail polish, lotion, powders or metal hairclips. 15. Contacts will need to be removed prior to surgery. You may want to bring your eye glasses to wear immediately before and after surgery. 14. Dentures will need to be removed before your procedure. 13. Bring cases for your glasses, contacts, dentures, or hearing aids to protect them while you are in surgery. 16. If you use a CPAP, please bring it with you on the day of your procedure. 17. Do not shave or wax for 72 hours prior to procedure near your operative site  18. FOR WOMAN OF CHILDBEARING AGE ONLY- please make sure we can collect a urine sample on arrival.     If you have further questions, you may contact your surgeon's office or us at [phone]     Left instructions on patient's voicemail.     Shira Anaya RN.9/30/2021 .4:07 PM

## 2021-10-08 NOTE — DISCHARGE SUMMARY
Physician Discharge Summary     Patient ID:  Colton Cheema  [id number]  70 y.o.  1950    Admit date: 9/3/2021    Discharge date and time: 9/5/2021  2:40 PM     Admitting Physician: Kayce Irby MD     Discharge Physician: Kayce Irby MD     Admission Diagnoses: Cancer of descending colon [facility]) [C18.6]  Adenocarcinoma of colon [facility]) [C18.9]    Discharge Diagnoses: Cancer of descending colon [facility] [C18.6]  Adenocarcinoma of colon ([facility] [C18.9]    PMH:  has a past medical history of CAD (coronary artery disease), Colon cancer ([facility] Coronary artery disease involving native coronary artery of native heart without angina pectoris, Diabetes mellitus with microalbuminuric diabetic nephropathy ([facility] Diverticulosis of colon, DM type 2 (diabetes mellitus, type 2) ([facility] ED (erectile dysfunction), Epistaxis, Homocysteinemia, HTN (hypertension), benign, Hyperlipidemia LDL goal < 70, Low testosterone, MI (myocardial infarction) ([facility] Neurofibromatosis ([facility] ANTONIETTA (obstructive sleep apnea), PSA elevation, Sensorineural hearing loss, bilateral, and Vitamin D deficiency. PSH:  has a past surgical history that includes Coronary angioplasty with stent (7/12/2004); nasal hemorrhage control (1/1/2012); Leg Surgery (Left, ~2001); Cardiac surgery (2004); Colonoscopy (01/01/2006); Colonoscopy (N/A, 08/31/2018); Colonoscopy (N/A, 8/16/2021); Colonoscopy (8/16/2021); Colonoscopy (8/16/2021); colectomy (N/A, 9/3/2021); and Port Surgery (N/A, 10/5/2021). Allergies: Patient has no known allergies. Admission Condition: good    Discharged Condition: good    Indication for Admission: Cancer of descending colon [facility]) [C18.6]  Adenocarcinoma of colon [facility]) [C18.9]    Hospital Course:     9/3: Colton Cheema is a 70 y.o. male with complicated medical Hx including CAD, MI s/p remote stent, T2DM, ANTONIETTA on CPAP who had rectal bleeding and colonoscopy with left colon mass with adenocarcinoma.     Patient underwent: Laparoscopic partial colectomy with colocolonic anastomosis with mobilization of splenic flexure (9/3)    Post op: Patient reports his pain is well-controlled with medications. Patient denies nausea or vomiting. Patient is tolerating sips of clears and will be ordering dinner soon. Patient was resting in bed and has not ambulated but reports he will be contacting nurse to help him move to chair for dinner. Patient has Copeland in place. Denies flatus or BM at this time. Plan at this time included pain management with Roxicodone pain panel and scheduled Tylenol, PRN hydralazine for HTN, IS use, home CPAP use, general diet with carb control, [place] until next day, SCDs, lovenox, and local wound care. Plan remained the same unless otherwise noted. POD 1 - Patient rested well overnight. Pain is well controlled. Patient is tolerating diet w/o nausea or vomiting. Patient had a small BM overnight and is passing flatus. Patient with some episodes of HTN treated with PRN Hydralazine; otherwise HDS and afebrile. Patient was given 1 L bolus due to rise in Cr. Copeland was removed and patient voided. POD 2- Patient rested well overnight. Pain is well controlled. He is tolerating a diet with no nausea or emesis. IV access re-obtained yesterday, give 1L bolus with improved Cr this morning.      Patient discharged home in stable condition    Discharge Physical Exam:  General appearance: alert, no acute distress, grooming appropriate  Eyes: No scleral icterus, EOM grossly intact  Neck: trachea midline, no JVD, no lymphadenopathy, neck supple  Chest/Lungs: Normal effort with no accessory muscle use on RA  Cardiovascular: RRR, well perfused  Abdomen: Soft, appropriately-tender, incisions c/d/i and well approximated with Dermabond  Skin: warm and dry, no rashes  Extremities: no edema, no cyanosis  Neuro: A&Ox3, no focal deficits, sensation intact    Consults:   N/A    Significant Diagnostic Studies:       Treatments/Procedures: surgery: as noted

## 2021-11-15 RX ORDER — CLONIDINE HYDROCHLORIDE 0.1 MG/1
TABLET ORAL
Qty: 30 TABLET | Refills: 3 | Status: SHIPPED | OUTPATIENT
Start: 2021-11-15 | End: 2022-04-21 | Stop reason: SDUPTHER

## 2021-12-14 ENCOUNTER — TELEPHONE (OUTPATIENT)
Dept: PRIMARY CARE CLINIC | Age: 71
End: 2021-12-14

## 2021-12-14 NOTE — TELEPHONE ENCOUNTER
Pt calling and stated that he is currently on chemo. His BP has been elevated and they advised pt to keep track of it. Pt states that his BP today was 168/88 83p  Pt has Chemo againg at 8:30 am tomorrow and wants your advice.

## 2021-12-20 ENCOUNTER — OFFICE VISIT (OUTPATIENT)
Dept: PRIMARY CARE CLINIC | Age: 71
End: 2021-12-20
Payer: MEDICARE

## 2021-12-20 VITALS
TEMPERATURE: 98.1 F | HEART RATE: 82 BPM | BODY MASS INDEX: 37.21 KG/M2 | OXYGEN SATURATION: 99 % | WEIGHT: 282 LBS | RESPIRATION RATE: 16 BRPM | SYSTOLIC BLOOD PRESSURE: 130 MMHG | DIASTOLIC BLOOD PRESSURE: 90 MMHG

## 2021-12-20 DIAGNOSIS — E11.21 DIABETES MELLITUS WITH MICROALBUMINURIC DIABETIC NEPHROPATHY (HCC): Chronic | ICD-10-CM

## 2021-12-20 DIAGNOSIS — I25.10 CORONARY ARTERY DISEASE INVOLVING NATIVE CORONARY ARTERY OF NATIVE HEART WITHOUT ANGINA PECTORIS: Chronic | ICD-10-CM

## 2021-12-20 DIAGNOSIS — E66.01 MORBIDLY OBESE (HCC): ICD-10-CM

## 2021-12-20 DIAGNOSIS — Z23 NEEDS FLU SHOT: ICD-10-CM

## 2021-12-20 DIAGNOSIS — I10 HTN (HYPERTENSION), BENIGN: Primary | Chronic | ICD-10-CM

## 2021-12-20 DIAGNOSIS — E78.5 HYPERLIPIDEMIA WITH TARGET LDL LESS THAN 70: Chronic | ICD-10-CM

## 2021-12-20 PROCEDURE — 90694 VACC AIIV4 NO PRSRV 0.5ML IM: CPT | Performed by: INTERNAL MEDICINE

## 2021-12-20 PROCEDURE — 3017F COLORECTAL CA SCREEN DOC REV: CPT | Performed by: INTERNAL MEDICINE

## 2021-12-20 PROCEDURE — 99214 OFFICE O/P EST MOD 30 MIN: CPT | Performed by: INTERNAL MEDICINE

## 2021-12-20 PROCEDURE — G0008 ADMIN INFLUENZA VIRUS VAC: HCPCS | Performed by: INTERNAL MEDICINE

## 2021-12-20 PROCEDURE — G8427 DOCREV CUR MEDS BY ELIG CLIN: HCPCS | Performed by: INTERNAL MEDICINE

## 2021-12-20 PROCEDURE — 1036F TOBACCO NON-USER: CPT | Performed by: INTERNAL MEDICINE

## 2021-12-20 PROCEDURE — 2022F DILAT RTA XM EVC RTNOPTHY: CPT | Performed by: INTERNAL MEDICINE

## 2021-12-20 PROCEDURE — 4040F PNEUMOC VAC/ADMIN/RCVD: CPT | Performed by: INTERNAL MEDICINE

## 2021-12-20 PROCEDURE — G8417 CALC BMI ABV UP PARAM F/U: HCPCS | Performed by: INTERNAL MEDICINE

## 2021-12-20 PROCEDURE — 3044F HG A1C LEVEL LT 7.0%: CPT | Performed by: INTERNAL MEDICINE

## 2021-12-20 PROCEDURE — 1123F ACP DISCUSS/DSCN MKR DOCD: CPT | Performed by: INTERNAL MEDICINE

## 2021-12-20 PROCEDURE — G8484 FLU IMMUNIZE NO ADMIN: HCPCS | Performed by: INTERNAL MEDICINE

## 2021-12-20 NOTE — ASSESSMENT & PLAN NOTE
Diabetes Mellitus Type II:  Home blood sugar records reviewed: fasting range: 120-130. No significant episodes of hypoglycemia. Compliant with medications. No polyuria, polydipsia, visual changes, foot problems, GI upset. Diabetic diet compliance:  compliant most of the time. Current exercise: none. Will check labs, and refill medications as appropriate. Hypertension:  Denies CP, SOB, visual changes, dizziness, palpitations or HA. He is adherent to a low sodium diet. Blood pressure typically runs ok  outside of the office. Continue current medications. Hyperlipidemia:  No new myalgias or GI upset on current medications. Lab Results   Component Value Date    LABA1C 6.7 08/14/2021    LABA1C 6.6 05/17/2021    LABA1C 6.7 08/25/2020     Lab Results   Component Value Date    LABMICR 12.90 (H) 05/21/2021    CREATININE 1.5 (H) 09/05/2021     Lab Results   Component Value Date    ALT 13 08/17/2021    AST 15 08/17/2021     No components found for: CHLPL  Lab Results   Component Value Date    TRIG 78 05/21/2021     Lab Results   Component Value Date    HDL 46 05/21/2021     Lab Results   Component Value Date    LDLCALC 79 05/21/2021       This problem was reviewed in detail. It is under control and stable. Will check blood work.

## 2021-12-20 NOTE — PROGRESS NOTES
Subjective:      Patient ID: Eduardo Zuniga is a 70 y.o. male. HPI  Established patient here for a visit to manage acute and chronic medical conditions as detailed below. HTN (hypertension), benign  This is a chronic problem. The problem is well controlled. Patient monitors readings regularly. Pertinent negatives include no chest pain, focal sensory loss, focal weakness, leg pain, myalgias or shortness of breath. No headaches or chest pain. Takes medications regularly. Blood pressure has been stable, blood work was reviewed, and advised patient to continue the current instructions or medications. Hyperlipidemia with target LDL less than 70  This has been a long standing problem, takes lipiotr      Monitors diet and tries to follow a low fat diet. Has  been reasonably  compliant w exercise. Lipids have been stable, The problem is controlled. Recent lipid tests were reviewed and are normal. Pertinent negatives include no chest pain, focal sensory loss, focal weakness, leg pain, myalgias or shortness of breath. Advised patient to continue the current instructions or medications. 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 No significant episodes of hypoglycemia. Compliant with medications. No polyuria, polydipsia, visual changes, foot problems, GI upset. Diabetic diet compliance:  compliant most of the time. Current exercise: none. Will check labs, and refill medications as appropriate. Hypertension:  Denies CP, SOB, visual changes, dizziness, palpitations or HA. He is adherent to a low sodium diet. Blood pressure typically runs ok  outside of the office. Continue current medications. Hyperlipidemia:  No new myalgias or GI upset on current medications.        Lab Results   Component Value Date    LABA1C 6.7 08/14/2021    LABA1C 6.6 05/17/2021    LABA1C 6.7 08/25/2020     Lab Results   Component Value Date    LABMICR 12.90 (H) 05/21/2021    CREATININE 1.5 (H) 09/05/2021     Lab Results   Component Value Date    ALT 13 08/17/2021    AST 15 08/17/2021     No components found for: CHLPL  Lab Results   Component Value Date    TRIG 78 05/21/2021     Lab Results   Component Value Date    HDL 46 05/21/2021     Lab Results   Component Value Date    LDLCALC 79 05/21/2021       This problem was reviewed in detail. It is under control and stable. Will check blood work. Coronary artery disease involving native coronary artery of native heart without angina pectoris  No cp or sob or dizziness,  On coreg,  Patient is compliant w medications, no side effects, effective, provides adequate symptom relief. No new symptoms or problems as noted by patient. The problem is stable, no changes noted by patient. Will consider monitoring labs and refill medications as appropriate. Patient counseled and will continue current plan. Morbidly obese ([address]  Patient counseled,   Encouraged to lose weight, watch diet and exercise consistently. Review of Systems  ROS: No unusual headaches or allergy symptoms or blurred vision. No prolonged cough. No flushing or facial pain or chest pain,dizziness, dyspnea, palpitations, or chest pain on exertion. No syncope. No nausea or vommitting or diarrhea. No jaundice or abdominal pain, change in bowel habits, black or bloody stools. No dysuria or hematuria or frequency of urination. No myalgias or muscle pain. No numbness, weakness, or tingling. No falls, or loss of consciousness. No weight loss or back pain. No falls. No paresthesias. No joint swelling or redness. No joint pain. No recent weight loss. No focal weakness or sensory deficits or paresthesias, No confusion or altered sensorium. No hematemesis. No hearing loss. No siezures.  All other systems were reviewed, and review was negative. Objective:   Physical Exam  BP (!) 130/90 (Site: Left Upper Arm, Position: Sitting, Cuff Size: Medium Adult)   Pulse 82   Temp 98.1 °F (36.7 °C)   Resp 16   Wt 282 lb (127.9 kg)   SpO2 99%   BMI 37.21 kg/m²    The physical exam reveals a patient who appears well, alert and oriented x 3, pleasant, cooperative. Vitals are as noted. Head is atraumatic and normocephalic. Eyes reveal normal conjunctiva, cornea normal, pupils are equal and rective to light. Nasal mucosa is normal. Throat is normal without exudates. Ears reveal normal tympanic membranes. Neck is supple and free of adenopathy, or masses. No thyromegaly. No jugular venous distension. Lungs are clear to auscultation, no rales or rhonchi noted. Heart sounds are regular , no murmurs, clicks, gallops or rubs. Abdomen is soft, no tenderness, masses or organomegaly. Bowel sounds are normally heard. Pelvis: normal. Extremities are normal. Peripheral pulses are normal. Screening neurological exam is normal without focal findings. Cranial nerves are intact, reflexes are symmetrical and muscle strength eaqual. Skin is normal without suspicious lesions noted. Assessment:      HTN (hypertension), benign  This is a chronic problem. The problem is well controlled. Patient monitors readings regularly. Pertinent negatives include no chest pain, focal sensory loss, focal weakness, leg pain, myalgias or shortness of breath. No headaches or chest pain. Takes medications regularly. Blood pressure has been stable, blood work was reviewed, and advised patient to continue the current instructions or medications. Hyperlipidemia with target LDL less than 70  This has been a long standing problem, takes lipiotr      Monitors diet and tries to follow a low fat diet. Has  been reasonably  compliant w exercise. Lipids have been stable, The problem is controlled.  Recent lipid tests were reviewed and are normal. Pertinent negatives include no chest pain, focal sensory loss, focal weakness, leg pain, myalgias or shortness of breath. Advised patient to continue the current instructions or medications. 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 No significant episodes of hypoglycemia. Compliant with medications. No polyuria, polydipsia, visual changes, foot problems, GI upset. Diabetic diet compliance:  compliant most of the time. Current exercise: none. Will check labs, and refill medications as appropriate. Hypertension:  Denies CP, SOB, visual changes, dizziness, palpitations or HA. He is adherent to a low sodium diet. Blood pressure typically runs ok  outside of the office. Continue current medications. Hyperlipidemia:  No new myalgias or GI upset on current medications. Lab Results   Component Value Date    LABA1C 6.7 08/14/2021    LABA1C 6.6 05/17/2021    LABA1C 6.7 08/25/2020     Lab Results   Component Value Date    LABMICR 12.90 (H) 05/21/2021    CREATININE 1.5 (H) 09/05/2021     Lab Results   Component Value Date    ALT 13 08/17/2021    AST 15 08/17/2021     No components found for: CHLPL  Lab Results   Component Value Date    TRIG 78 05/21/2021     Lab Results   Component Value Date    HDL 46 05/21/2021     Lab Results   Component Value Date    LDLCALC 79 05/21/2021       This problem was reviewed in detail. It is under control and stable. Will check blood work. Coronary artery disease involving native coronary artery of native heart without angina pectoris  No cp or sob or dizziness,  On coreg,  Patient is compliant w medications, no side effects, effective, provides adequate symptom relief. No new symptoms or problems as noted by patient. The problem is stable, no changes noted by patient.  Will consider monitoring labs and refill medications as appropriate. Patient counseled and will continue current plan. Morbidly obese ([facility] [address]  Patient counseled,   Encouraged to lose weight, watch diet and exercise consistently. Plan:      Labs ordered, reviewed. Medications refilled. All Health maintenance needs reviewed and the needful ordered.            Dorota Ortega MD

## 2021-12-20 NOTE — ASSESSMENT & PLAN NOTE
No cp or sob or dizziness,  On coreg,  Patient is compliant w medications, no side effects, effective, provides adequate symptom relief. No new symptoms or problems as noted by patient. The problem is stable, no changes noted by patient. Will consider monitoring labs and refill medications as appropriate. Patient counseled and will continue current plan.

## 2022-01-18 ENCOUNTER — OFFICE VISIT (OUTPATIENT)
Dept: CARDIOLOGY CLINIC | Age: 72
End: 2022-01-18
Payer: MEDICARE

## 2022-01-18 VITALS
BODY MASS INDEX: 38.04 KG/M2 | HEART RATE: 76 BPM | OXYGEN SATURATION: 98 % | SYSTOLIC BLOOD PRESSURE: 156 MMHG | HEIGHT: 73 IN | WEIGHT: 287 LBS | DIASTOLIC BLOOD PRESSURE: 82 MMHG

## 2022-01-18 DIAGNOSIS — I25.10 CORONARY ARTERY DISEASE INVOLVING NATIVE CORONARY ARTERY OF NATIVE HEART WITHOUT ANGINA PECTORIS: Chronic | ICD-10-CM

## 2022-01-18 DIAGNOSIS — E78.5 HYPERLIPIDEMIA WITH TARGET LDL LESS THAN 70: Chronic | ICD-10-CM

## 2022-01-18 DIAGNOSIS — I10 HTN (HYPERTENSION), BENIGN: Primary | Chronic | ICD-10-CM

## 2022-01-18 PROCEDURE — 1123F ACP DISCUSS/DSCN MKR DOCD: CPT | Performed by: INTERNAL MEDICINE

## 2022-01-18 PROCEDURE — G8484 FLU IMMUNIZE NO ADMIN: HCPCS | Performed by: INTERNAL MEDICINE

## 2022-01-18 PROCEDURE — G8427 DOCREV CUR MEDS BY ELIG CLIN: HCPCS | Performed by: INTERNAL MEDICINE

## 2022-01-18 PROCEDURE — 1036F TOBACCO NON-USER: CPT | Performed by: INTERNAL MEDICINE

## 2022-01-18 PROCEDURE — 4040F PNEUMOC VAC/ADMIN/RCVD: CPT | Performed by: INTERNAL MEDICINE

## 2022-01-18 PROCEDURE — 3017F COLORECTAL CA SCREEN DOC REV: CPT | Performed by: INTERNAL MEDICINE

## 2022-01-18 PROCEDURE — 99214 OFFICE O/P EST MOD 30 MIN: CPT | Performed by: INTERNAL MEDICINE

## 2022-01-18 PROCEDURE — G8417 CALC BMI ABV UP PARAM F/U: HCPCS | Performed by: INTERNAL MEDICINE

## 2022-01-18 RX ORDER — LANOLIN ALCOHOL/MO/W.PET/CERES
325 CREAM (GRAM) TOPICAL 2 TIMES DAILY
COMMUNITY
End: 2022-08-26

## 2022-01-18 NOTE — PROGRESS NOTES
[address]   Cardiac Evaluation      Patient: Zeyad Taveras  YOB: 1950  Date: 1/18/22       Chief Complaint   Patient presents with    Coronary Artery Disease    Hyperlipidemia    Hypertension        Referring provider: Power Stacy MD    History of Present Illness:   Mr Sanjay Messer is seen today in follow up. He has a h/o CAD, HTN, ANTONIETTA, and hyperlipidemia. 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 Mr Sanjay Messer has been diagnosed with adenocarcinoma of the colon. He underwent laparoscopic partial colectomy 9/3/21 with Dr Alberto Sun and has been receiving chemotherapy. He states he feels fatigued and tired after chemo. He states he got a stationary bike for [address] and has been riding it 10 minutes twice daily. States his b/p has been running high, is consistently 150's/80's. Reji Spencer states he was placed on Iron and subsequently developed constipation. He denies any chest pain, palpitations, dizziness, GENTILE, or edema. Past Medical History:   has a past medical history of CAD (coronary artery disease), Colon cancer ([address] Coronary artery disease involving native coronary artery of native heart without angina pectoris, Diabetes mellitus with microalbuminuric diabetic nephropathy ([address] Diverticulosis of colon, DM type 2 (diabetes mellitus, type 2) ([address] ED (erectile dysfunction), Epistaxis, Homocysteinemia, HTN (hypertension), benign, Hyperlipidemia LDL goal < 70, Low testosterone, MI (myocardial infarction) ([address] Neurofibromatosis ([address] ANTONIETTA (obstructive sleep apnea), PSA elevation, Sensorineural hearing loss, bilateral, and Vitamin D deficiency. Surgical History:   has a past surgical history that includes Coronary angioplasty with stent (7/12/2004); nasal hemorrhage control (1/1/2012); Leg Surgery (Left, ~2001); Cardiac surgery (2004); Colonoscopy (01/01/2006); Colonoscopy (N/A, 08/31/2018); Colonoscopy (N/A, 8/16/2021); Colonoscopy (8/16/2021);  Colonoscopy (8/16/2021); colectomy (N/A, 9/3/2021); and Port Surgery (N/A, 10/5/2021). Current Outpatient Medications   Medication Sig Dispense Refill    ferrous sulfate (FE TABS 325) 325 (65 Fe) MG EC tablet Take 325 mg by mouth 2 times daily      cloNIDine (CATAPRES) 0.1 MG tablet 1 po daily at night 30 tablet 3    carvedilol (COREG) 25 MG tablet 1 po bid 60 tablet 6    atorvastatin (LIPITOR) 20 MG tablet TAKE 1 TABLET BY MOUTH EVERY DAY 30 tablet 6    amLODIPine (NORVASC) 10 MG tablet TAKE 1 TABLET BY MOUTH EVERY DAY 30 tablet 6    doxazosin (CARDURA) 8 MG tablet TAKE 1 TABLET BY MOUTH EVERY NIGHT 90 tablet 3    valsartan-hydroCHLOROthiazide (DIOVAN-HCT) 320-12.5 MG per tablet Take 1 tablet by mouth daily 90 tablet 3    travoprost, benzalkonium, (TRAVATAN) 0.004 % ophthalmic solution Place 1 drop into both eyes nightly.  aspirin 81 MG tablet Take 81 mg by mouth daily.  sildenafil (VIAGRA) 50 MG tablet Take 1 tablet by mouth daily as needed for Erectile Dysfunction (Patient not taking: Reported on 12/20/2021) 10 tablet 3    ONETOUCH DELICA LANCETS 88L MISC Test blood sugar twice daily. 3    glucose blood VI test strips (ASCENSIA AUTODISC VI;ONE TOUCH ULTRA TEST VI) strip 1 each by In Vitro route 2 times daily. As needed. 100 each 3    Blood Glucose Monitoring Suppl (BLOOD GLUCOSE METER) KIT 1 Device by Does not apply route 2 times daily. 1 kit 0    therapeutic multivitamin-minerals (THERAGRAN-M) tablet Take 1 tablet by mouth daily. No current facility-administered medications for this visit.        Social History:  Social History     Social History    Marital status:      Spouse name: Dori Malik Number of children: 2    Years of education: 25     Occupational History    semi-retired       outside consulting     Social History Main Topics    Smoking status: Former Smoker     Types: Pipe     Quit date: 1/1/1976    Smokeless tobacco: Never Used      Comment: [age] year 2 bowls /day    Alcohol use 0.0 oz/week      Comment: Wine occasionally ie wedding/business event. Was drinking mid 34's 9-8 scotch 1 yr.  Drug use: No      Comment: Tried MJ / hash.  Sexual activity: Not on file     Other Topics Concern    From [facility] [place] and appreciates [employer] cooking. Social History Narrative    Working 25-30 hours. Always brings interesting new candies from the [employer]. Purple M/Ms. I pound [employer] bar today. Interesting popcorn in the past.        Family History:  family history includes High Blood Pressure in his father and mother; Hypertension in his brother; Danne Tani in his father; No Known Problems in his son; Other in his brother; Stroke in his brother. Allergies:  Patient has no known allergies. Review of Systems:   · Constitutional: there has been no unanticipated weight loss. No change in energy or activity level   · Eyes: No visual changes   · ENT: No Headaches, hearing loss or vertigo. No mouth sores or sore throat. · Cardiovascular: Reviewed in HPI  · Respiratory: No cough or wheezing, no sputum production. · Gastrointestinal: No abdominal pain, appetite loss, blood in stools. No change in bowel or bladder habits. · Genitourinary: No nocturia, dysuria, trouble voiding  · Musculoskeletal:  No gait disturbance, weakness or joint complaints. · Integumentary: No rash or pruritis. · Neurological: No headache, change in muscle strength, numbness or tingling. No change in gait, balance, coordination, mood, affect, memory, mentation, behavior. · Psychiatric: No anxiety or depression  · Endocrine: No malaise or fever  · Hematologic/Lymphatic: No abnormal bruising or bleeding, blood clots or swollen lymph nodes. · Allergic/Immunologic: No nasal congestion or hives.     Physical Examination:    Vitals:    01/18/22 1312 01/18/22 1318   BP: (!) 150/86 (!) 156/82   Site: Left Upper Arm Left Upper Arm   Position: Sitting Sitting   Cuff Size: Large Adult Large Adult   Pulse: 76    SpO2: 98%    Weight: 287 lb (130.2 kg)    Height: 6' 1\" (1.854 m)      Body mass index is 37.87 kg/m². Wt Readings from Last 3 Encounters:   01/18/22 287 lb (130.2 kg)   12/20/21 282 lb (127.9 kg)   10/05/21 285 lb (129.3 kg)      BP Readings from Last 3 Encounters:   01/18/22 (!) 156/82   12/20/21 (!) 130/90   10/05/21 115/60      Constitutional and General Appearance:  appears stated age,   Eyes - no xanthelasma  Respiratory:  · Normal excursion and expansion without use of accessory muscles  · Resp Auscultation: Normal breath sounds without dullness  Cardiovascular:  · The apical impulses not displaced  · Heart is regular rate and rhythm with normal S1, S2  · PMI is normal  · The carotid upstroke is normal, no bruit noted   · JVP is not elevated  · Peripheral pulses are symmetrical  · There is no clubbing, cyanosis of the extremities  · No edema BLE  · No varicosities  · Femoral Arteries: 2+ and equal without bruits  · Pedal Pulses: 2+ and equal   Abdomen:  · No masses or tenderness  · Aorta not palpable  · Normal bowel sounds  Neurological/Psychiatric:  · Alert and oriented x3  · Moves all extremities well  · Exhibits normal gait balance and coordination      Assessment/Plan  1. Coronary artery disease involving native coronary artery of native heart without angina pectoris -stable  Stress echo 2/17: Normal augmentation of all segments. EF 60%. No regional wall motion abnormalities are seen. moderate concentric left ventricular hypertrophy. E/e\"= 14. Mild mitral regurgitation. Aortic valve appears sclerotic but opens adequately. No stenosis. Mild aortic regurgitation is present. Pressure half-time is calculated at 613 msec. Mild tricuspid regurgitation with RVSP estimated at 28 mmHg  2004 cath-- Taxus stents distal/prox RCA; OM disease    2. HTN (hypertension), benign -  B/p at home has been running 150's/80's   3. Hyperlipidemia - stable on labs 5/21/21: ; TRIG 78;  HDL 46; LDL 79, lipitor 20mg daily         PLAN:  Miralax every other day for constipation. He can take extra Clonidine for high sb/p. He is encouraged to continue to ride the stationary bike daily. FU 6 months. Scribe's attestation: This note was scribed in the presence of Dr Anali Corona MD by Jemima Prieto, ALEXIS. The scribe's documentation has been prepared under my direction and personally reviewed by me in its entirety. I confirm that the note above accurately reflects all work, treatment, procedures, and medical decision making performed by me. Thank you for allowing to me to participate in the care of Zunilda Vences.

## 2022-03-02 RX ORDER — AMLODIPINE BESYLATE 10 MG/1
TABLET ORAL
Qty: 30 TABLET | Refills: 6 | Status: SHIPPED | OUTPATIENT
Start: 2022-03-02 | End: 2022-03-02

## 2022-03-02 RX ORDER — AMLODIPINE BESYLATE 10 MG/1
TABLET ORAL
Qty: 90 TABLET | Refills: 3 | Status: SHIPPED | OUTPATIENT
Start: 2022-03-02

## 2022-03-03 RX ORDER — CARVEDILOL 25 MG/1
TABLET ORAL
Qty: 180 TABLET | Refills: 3 | Status: SHIPPED | OUTPATIENT
Start: 2022-03-03

## 2022-03-03 RX ORDER — ATORVASTATIN CALCIUM 20 MG/1
TABLET, FILM COATED ORAL
Qty: 90 TABLET | Refills: 3 | Status: SHIPPED | OUTPATIENT
Start: 2022-03-03

## 2022-04-11 RX ORDER — DOXAZOSIN 8 MG/1
TABLET ORAL
Qty: 90 TABLET | Refills: 3 | Status: SHIPPED | OUTPATIENT
Start: 2022-04-11 | End: 2022-08-26

## 2022-04-21 ENCOUNTER — OFFICE VISIT (OUTPATIENT)
Dept: PRIMARY CARE CLINIC | Age: 72
End: 2022-04-21
Payer: MEDICARE

## 2022-04-21 VITALS
TEMPERATURE: 96.6 F | HEART RATE: 79 BPM | OXYGEN SATURATION: 99 % | BODY MASS INDEX: 37.73 KG/M2 | WEIGHT: 286 LBS | SYSTOLIC BLOOD PRESSURE: 150 MMHG | RESPIRATION RATE: 14 BRPM | DIASTOLIC BLOOD PRESSURE: 90 MMHG

## 2022-04-21 DIAGNOSIS — C18.9 ADENOCARCINOMA OF COLON (HCC): ICD-10-CM

## 2022-04-21 DIAGNOSIS — E11.21 DIABETES MELLITUS WITH MICROALBUMINURIC DIABETIC NEPHROPATHY (HCC): ICD-10-CM

## 2022-04-21 DIAGNOSIS — I25.10 CORONARY ARTERY DISEASE INVOLVING NATIVE CORONARY ARTERY OF NATIVE HEART WITHOUT ANGINA PECTORIS: Chronic | ICD-10-CM

## 2022-04-21 DIAGNOSIS — E66.01 MORBIDLY OBESE (HCC): ICD-10-CM

## 2022-04-21 DIAGNOSIS — I10 HTN (HYPERTENSION), BENIGN: Chronic | ICD-10-CM

## 2022-04-21 DIAGNOSIS — E72.11 HOMOCYSTINURIA (HCC): Primary | ICD-10-CM

## 2022-04-21 DIAGNOSIS — E78.5 HYPERLIPIDEMIA WITH TARGET LDL LESS THAN 70: Chronic | ICD-10-CM

## 2022-04-21 DIAGNOSIS — C18.6 MALIGNANT NEOPLASM OF DESCENDING COLON (HCC): Chronic | ICD-10-CM

## 2022-04-21 LAB — HBA1C MFR BLD: 7.1 %

## 2022-04-21 PROCEDURE — 1123F ACP DISCUSS/DSCN MKR DOCD: CPT | Performed by: INTERNAL MEDICINE

## 2022-04-21 PROCEDURE — 99214 OFFICE O/P EST MOD 30 MIN: CPT | Performed by: INTERNAL MEDICINE

## 2022-04-21 PROCEDURE — 3046F HEMOGLOBIN A1C LEVEL >9.0%: CPT | Performed by: INTERNAL MEDICINE

## 2022-04-21 PROCEDURE — 2022F DILAT RTA XM EVC RTNOPTHY: CPT | Performed by: INTERNAL MEDICINE

## 2022-04-21 PROCEDURE — G8427 DOCREV CUR MEDS BY ELIG CLIN: HCPCS | Performed by: INTERNAL MEDICINE

## 2022-04-21 PROCEDURE — 3017F COLORECTAL CA SCREEN DOC REV: CPT | Performed by: INTERNAL MEDICINE

## 2022-04-21 PROCEDURE — 1036F TOBACCO NON-USER: CPT | Performed by: INTERNAL MEDICINE

## 2022-04-21 PROCEDURE — 83036 HEMOGLOBIN GLYCOSYLATED A1C: CPT | Performed by: INTERNAL MEDICINE

## 2022-04-21 PROCEDURE — 4040F PNEUMOC VAC/ADMIN/RCVD: CPT | Performed by: INTERNAL MEDICINE

## 2022-04-21 PROCEDURE — G8417 CALC BMI ABV UP PARAM F/U: HCPCS | Performed by: INTERNAL MEDICINE

## 2022-04-21 RX ORDER — CLONIDINE HYDROCHLORIDE 0.1 MG/1
TABLET ORAL
Qty: 60 TABLET | Refills: 5 | Status: SHIPPED | OUTPATIENT
Start: 2022-04-21 | End: 2022-04-21

## 2022-04-21 RX ORDER — CLONIDINE HYDROCHLORIDE 0.1 MG/1
TABLET ORAL
Qty: 180 TABLET | Refills: 1 | Status: SHIPPED | OUTPATIENT
Start: 2022-04-21

## 2022-04-21 ASSESSMENT — PATIENT HEALTH QUESTIONNAIRE - PHQ9
SUM OF ALL RESPONSES TO PHQ9 QUESTIONS 1 & 2: 0
SUM OF ALL RESPONSES TO PHQ QUESTIONS 1-9: 0
1. LITTLE INTEREST OR PLEASURE IN DOING THINGS: 0
2. FEELING DOWN, DEPRESSED OR HOPELESS: 0
SUM OF ALL RESPONSES TO PHQ QUESTIONS 1-9: 0

## 2022-04-21 NOTE — ASSESSMENT & PLAN NOTE
No cp or sob or dizziness,  Patient is compliant w medications, no side effects, effective, provides adequate symptom relief. No new symptoms or problems as noted by patient. The problem is stable, no changes noted by patient. Will consider monitoring labs and refill medications as appropriate. Patient counseled and will continue current plan.

## 2022-04-21 NOTE — PROGRESS NOTES
Subjective:      Patient ID: James Sterling is a 70 y.o. male. HPI  Established patient here for a visit to manage acute and chronic medical conditions as detailed below. HTN (hypertension), benign  This is a chronic problem. The problem is well controlled. Patient monitors readings regularly. Pertinent negatives include no chest pain, focal sensory loss, focal weakness, leg pain, myalgias or shortness of breath. No headaches or chest pain. Takes medications regularly. Blood pressure has been stable, blood work was reviewed, and advised patient to continue the current instructions or medications. Hyperlipidemia with target LDL less than 70  This has been a long standing problem, takes lipitor      Monitors diet and tries to follow a low fat diet. Has  been reasonably  compliant w exercise. Lipids have been stable, The problem is controlled. Recent lipid tests were reviewed and are normal. Pertinent negatives include no chest pain, focal sensory loss, focal weakness, leg pain, myalgias or shortness of breath. Advised patient to continue the current instructions or medications. Morbidly obese ([address]  Patient counseled,   Encouraged to lose weight, watch diet and exercise consistently. Adenocarcinoma of colon [facility])  Seeing oncologist,  Patient is compliant w medications, no side effects, effective, provides adequate symptom relief. No new symptoms or problems as noted by patient. The problem is stable, no changes noted by patient. Will consider monitoring labs and refill medications as appropriate. Patient counseled and will continue current plan. 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 No significant episodes of hypoglycemia. Compliant with medications. No polyuria, polydipsia, visual changes, foot problems, GI upset.   Diabetic diet compliance:  compliant most of the time.  Current exercise: none. Will check labs, and refill medications as appropriate. Hypertension:  Denies CP, SOB, visual changes, dizziness, palpitations or HA. He is adherent to a low sodium diet. Blood pressure typically runs ok  outside of the office. Continue current medications. Hyperlipidemia:  No new myalgias or GI upset on current medications. Lab Results   Component Value Date    LABA1C 6.7 08/14/2021    LABA1C 6.6 05/17/2021    LABA1C 6.7 08/25/2020     Lab Results   Component Value Date    LABMICR 12.90 (H) 05/21/2021    CREATININE 1.5 (H) 09/05/2021     Lab Results   Component Value Date    ALT 13 08/17/2021    AST 15 08/17/2021     No components found for: CHLPL  Lab Results   Component Value Date    TRIG 78 05/21/2021     Lab Results   Component Value Date    HDL 46 05/21/2021     Lab Results   Component Value Date    LDLCALC 79 05/21/2021      This problem is stable, reviewed in detail, and advised patient to continue the current instructions or medications. Will continue to closely monitor the situation. Malignant neoplasm of descending colon [facility])  Seeing oncology,  Patient is compliant w medications, no side effects, effective, provides adequate symptom relief. No new symptoms or problems as noted by patient. The problem is stable, no changes noted by patient. Will consider monitoring labs and refill medications as appropriate. Patient counseled and will continue current plan. Coronary artery disease involving native coronary artery of native heart without angina pectoris  No cp or sob or dizziness,  Patient is compliant w medications, no side effects, effective, provides adequate symptom relief. No new symptoms or problems as noted by patient. The problem is stable, no changes noted by patient. Will consider monitoring labs and refill medications as appropriate. Patient counseled and will continue current plan.        Review of Systems  ROS: No unusual headaches or allergy symptoms or blurred vision. No prolonged cough. No flushing or facial pain or chest pain,dizziness, dyspnea, palpitations, or chest pain on exertion. No syncope. No nausea or vommitting or diarrhea. No jaundice or abdominal pain, change in bowel habits, black or bloody stools. No dysuria or hematuria or frequency of urination. No myalgias or muscle pain. No numbness, weakness, or tingling. No falls, or loss of consciousness. No weight loss or back pain. No falls. No paresthesias. No joint swelling or redness. No joint pain. No recent weight loss. No focal weakness or sensory deficits or paresthesias, No confusion or altered sensorium. No hematemesis. No hearing loss. No siezures. All other systems were reviewed, and review was negative. Objective:   Physical Exam  BP (!) 154/90 (Site: Left Upper Arm, Position: Sitting, Cuff Size: Medium Adult)   Pulse 79   Temp 96.6 °F (35.9 °C)   Resp 14   Wt 286 lb (129.7 kg)   SpO2 99%   BMI 37.73 kg/m²    The physical exam reveals a patient who appears well, alert and oriented x 3, pleasant, cooperative. Vitals are as noted. Head is atraumatic and normocephalic. Eyes reveal normal conjunctiva, cornea normal, pupils are equal and rective to light. Nasal mucosa is normal. Throat is normal without exudates. Ears reveal normal tympanic membranes. Neck is supple and free of adenopathy, or masses. No thyromegaly. No jugular venous distension. Lungs are clear to auscultation, no rales or rhonchi noted. Heart sounds are regular , no murmurs, clicks, gallops or rubs. Abdomen is soft, no tenderness, masses or organomegaly. Bowel sounds are normally heard. Pelvis: normal. Extremities are normal. Peripheral pulses are normal. Screening neurological exam is normal without focal findings. Cranial nerves are intact, reflexes are symmetrical and muscle strength eaqual. Skin is normal without suspicious lesions noted.      Assessment:      HTN (hypertension), benign  This is a chronic problem. The problem is well controlled. Patient monitors readings regularly. Pertinent negatives include no chest pain, focal sensory loss, focal weakness, leg pain, myalgias or shortness of breath. No headaches or chest pain. Takes medications regularly. Blood pressure has been stable, blood work was reviewed, and advised patient to continue the current instructions or medications. Hyperlipidemia with target LDL less than 70  This has been a long standing problem, takes lipitor      Monitors diet and tries to follow a low fat diet. Has  been reasonably  compliant w exercise. Lipids have been stable, The problem is controlled. Recent lipid tests were reviewed and are normal. Pertinent negatives include no chest pain, focal sensory loss, focal weakness, leg pain, myalgias or shortness of breath. Advised patient to continue the current instructions or medications. Morbidly obese ([address]  Patient counseled,   Encouraged to lose weight, watch diet and exercise consistently. Adenocarcinoma of colon [facility])  Seeing oncologist,  Patient is compliant w medications, no side effects, effective, provides adequate symptom relief. No new symptoms or problems as noted by patient. The problem is stable, no changes noted by patient. Will consider monitoring labs and refill medications as appropriate. Patient counseled and will continue current plan. 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 No significant episodes of hypoglycemia. Compliant with medications. No polyuria, polydipsia, visual changes, foot problems, GI upset. Diabetic diet compliance:  compliant most of the time. Current exercise: none. Will check labs, and refill medications as appropriate. Hypertension:  Denies CP, SOB, visual changes, dizziness, palpitations or HA. He is adherent to a low sodium diet.   Blood pressure typically runs ok  outside of the office. Continue current medications. Hyperlipidemia:  No new myalgias or GI upset on current medications. Lab Results   Component Value Date    LABA1C 6.7 08/14/2021    LABA1C 6.6 05/17/2021    LABA1C 6.7 08/25/2020     Lab Results   Component Value Date    LABMICR 12.90 (H) 05/21/2021    CREATININE 1.5 (H) 09/05/2021     Lab Results   Component Value Date    ALT 13 08/17/2021    AST 15 08/17/2021     No components found for: CHLPL  Lab Results   Component Value Date    TRIG 78 05/21/2021     Lab Results   Component Value Date    HDL 46 05/21/2021     Lab Results   Component Value Date    LDLCALC 79 05/21/2021      This problem is stable, reviewed in detail, and advised patient to continue the current instructions or medications. Will continue to closely monitor the situation. Malignant neoplasm of descending colon [facility])  Seeing oncology,  Patient is compliant w medications, no side effects, effective, provides adequate symptom relief. No new symptoms or problems as noted by patient. The problem is stable, no changes noted by patient. Will consider monitoring labs and refill medications as appropriate. Patient counseled and will continue current plan. Coronary artery disease involving native coronary artery of native heart without angina pectoris  No cp or sob or dizziness,  Patient is compliant w medications, no side effects, effective, provides adequate symptom relief. No new symptoms or problems as noted by patient. The problem is stable, no changes noted by patient. Will consider monitoring labs and refill medications as appropriate. Patient counseled and will continue current plan. Plan:      Labs ordered, reviewed. Medications refilled. All Health maintenance needs reviewed and the needful ordered.            Hope Pate MD

## 2022-04-21 NOTE — TELEPHONE ENCOUNTER
Medication:   Requested Prescriptions     Pending Prescriptions Disp Refills    cloNIDine (CATAPRES) 0.1 MG tablet [Pharmacy Med Name: CLONIDINE 0.1MG TABLETS] 180 tablet      Sig: TAKE 1 TABLET BY MOUTH TWICE DAILY     Last Filled: 4.21.22  Last appt: 4/21/2022   Next appt: Visit date not found    Last OARRS: No flowsheet data found.

## 2022-04-21 NOTE — ASSESSMENT & PLAN NOTE
Diabetes Mellitus Type II:  Home blood sugar records reviewed: fasting range: 120-130. No significant episodes of hypoglycemia. Compliant with medications. No polyuria, polydipsia, visual changes, foot problems, GI upset. Diabetic diet compliance:  compliant most of the time. Current exercise: none. Will check labs, and refill medications as appropriate. Hypertension:  Denies CP, SOB, visual changes, dizziness, palpitations or HA. He is adherent to a low sodium diet. Blood pressure typically runs ok  outside of the office. Continue current medications. Hyperlipidemia:  No new myalgias or GI upset on current medications. Lab Results   Component Value Date    LABA1C 6.7 08/14/2021    LABA1C 6.6 05/17/2021    LABA1C 6.7 08/25/2020     Lab Results   Component Value Date    LABMICR 12.90 (H) 05/21/2021    CREATININE 1.5 (H) 09/05/2021     Lab Results   Component Value Date    ALT 13 08/17/2021    AST 15 08/17/2021     No components found for: CHLPL  Lab Results   Component Value Date    TRIG 78 05/21/2021     Lab Results   Component Value Date    HDL 46 05/21/2021     Lab Results   Component Value Date    LDLCALC 79 05/21/2021      This problem is stable, reviewed in detail, and advised patient to continue the current instructions or medications. Will continue to closely monitor the situation.

## 2022-04-21 NOTE — ASSESSMENT & PLAN NOTE
Seeing oncologist,  Patient is compliant w medications, no side effects, effective, provides adequate symptom relief. No new symptoms or problems as noted by patient. The problem is stable, no changes noted by patient. Will consider monitoring labs and refill medications as appropriate. Patient counseled and will continue current plan.

## 2022-05-27 ENCOUNTER — HOSPITAL ENCOUNTER (OUTPATIENT)
Dept: CT IMAGING | Age: 72
Discharge: HOME | End: 2022-05-27
Payer: MEDICARE

## 2022-05-27 ENCOUNTER — HOSPITAL ENCOUNTER (OUTPATIENT)
Age: 72
Discharge: HOME | End: 2022-05-27
Payer: MEDICARE

## 2022-05-27 DIAGNOSIS — C18.4 MALIGNANT NEOPLASM OF TRANSVERSE COLON (HCC): ICD-10-CM

## 2022-05-27 LAB — PROSTATE SPECIFIC ANTIGEN: 1.73 NG/ML (ref 0–4)

## 2022-05-27 PROCEDURE — 74177 CT ABD & PELVIS W/CONTRAST: CPT

## 2022-05-27 PROCEDURE — A4641 RADIOPHARM DX AGENT NOC: HCPCS | Performed by: INTERNAL MEDICINE

## 2022-05-27 PROCEDURE — 6360000004 HC RX CONTRAST MEDICATION: Performed by: INTERNAL MEDICINE

## 2022-05-27 PROCEDURE — 84153 ASSAY OF PSA TOTAL: CPT

## 2022-05-27 PROCEDURE — 36415 COLL VENOUS BLD VENIPUNCTURE: CPT

## 2022-05-27 RX ADMIN — IOPAMIDOL 75 ML: 755 INJECTION, SOLUTION INTRAVENOUS at 11:55

## 2022-05-27 RX ADMIN — BARIUM SULFATE 450 ML: 21 SUSPENSION ORAL at 11:55

## 2022-08-26 ENCOUNTER — OFFICE VISIT (OUTPATIENT)
Dept: CARDIOLOGY CLINIC | Age: 72
End: 2022-08-26
Payer: MEDICARE

## 2022-08-26 VITALS
HEART RATE: 67 BPM | HEIGHT: 73 IN | SYSTOLIC BLOOD PRESSURE: 138 MMHG | OXYGEN SATURATION: 97 % | WEIGHT: 280 LBS | BODY MASS INDEX: 37.11 KG/M2 | DIASTOLIC BLOOD PRESSURE: 80 MMHG

## 2022-08-26 DIAGNOSIS — E78.5 HYPERLIPIDEMIA WITH TARGET LDL LESS THAN 70: Primary | ICD-10-CM

## 2022-08-26 DIAGNOSIS — I10 HTN (HYPERTENSION), BENIGN: ICD-10-CM

## 2022-08-26 DIAGNOSIS — I25.10 CORONARY ARTERY DISEASE INVOLVING NATIVE CORONARY ARTERY OF NATIVE HEART WITHOUT ANGINA PECTORIS: ICD-10-CM

## 2022-08-26 PROCEDURE — G8427 DOCREV CUR MEDS BY ELIG CLIN: HCPCS | Performed by: INTERNAL MEDICINE

## 2022-08-26 PROCEDURE — G8417 CALC BMI ABV UP PARAM F/U: HCPCS | Performed by: INTERNAL MEDICINE

## 2022-08-26 PROCEDURE — 99214 OFFICE O/P EST MOD 30 MIN: CPT | Performed by: INTERNAL MEDICINE

## 2022-08-26 PROCEDURE — 1036F TOBACCO NON-USER: CPT | Performed by: INTERNAL MEDICINE

## 2022-08-26 PROCEDURE — 3017F COLORECTAL CA SCREEN DOC REV: CPT | Performed by: INTERNAL MEDICINE

## 2022-08-26 PROCEDURE — 1123F ACP DISCUSS/DSCN MKR DOCD: CPT | Performed by: INTERNAL MEDICINE

## 2022-08-26 RX ORDER — TAMSULOSIN HYDROCHLORIDE 0.4 MG/1
CAPSULE ORAL
COMMUNITY
Start: 2022-08-17

## 2022-08-26 NOTE — PROGRESS NOTES
[address]   Cardiac Evaluation      Patient: Raechel Peabody  YOB: 1950  Date: 8/26/22       Chief Complaint   Patient presents with    Coronary Artery Disease    Hyperlipidemia    Hypertension          Referring provider: Tanna Box MD    History of Present Illness:   Mr Yolande Habermann is seen today in follow up. He has a h/o CAD, HTN, ANTONIETTA, and hyperlipidemia. 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 Mr Yolande Habermann has been diagnosed with adenocarcinoma of the colon. He underwent laparoscopic partial colectomy 9/3/21 with Dr Juvenal Joyner and received 12 rounds of chemotherapy. He recently had a CT that was negative for metastatic disease. Mr Yolande Habermann states he feels well. He denies chest pain, palpitations, dizziness, or edema. He monitors his b/p regularly. Tim rides a bike every other day and walks his neighborhood. Past Medical History:   has a past medical history of CAD (coronary artery disease), Colon cancer ([facility] [address] Coronary artery disease involving native coronary artery of native heart without angina pectoris, Diabetes mellitus with microalbuminuric diabetic nephropathy ([address] Diverticulosis of colon, DM type 2 (diabetes mellitus, type 2) ([address] ED (erectile dysfunction), Epistaxis, Homocysteinemia, HTN (hypertension), benign, Hyperlipidemia LDL goal < 70, Low testosterone, MI (myocardial infarction) ([address] Neurofibromatosis ([facility] [address] ANTONIETTA (obstructive sleep apnea), PSA elevation, Sensorineural hearing loss, bilateral, and Vitamin D deficiency. Surgical History:   has a past surgical history that includes Coronary angioplasty with stent (7/12/2004); nasal hemorrhage control (1/1/2012); Leg Surgery (Left, ~2001); Cardiac surgery (2004); Colonoscopy (01/01/2006); Colonoscopy (N/A, 08/31/2018); Colonoscopy (N/A, 8/16/2021); Colonoscopy (8/16/2021); Colonoscopy (8/16/2021); colectomy (N/A, 9/3/2021); and Port Surgery (N/A, 10/5/2021).      Current Outpatient Medications From [facility] and appreciates Val[place]. Social History Narrative    Working 25-30 hours. Always brings interesting new candies from the [employer]. Purple [employer]. I pound snicker bar. Interesting popcorn in the past. Kevin Gutierres and Ms. Family History:  family history includes High Blood Pressure in his father and mother; Hypertension in his brother; Niurka Barer in his father; No Known Problems in his son; Other in his brother; Stroke in his brother. Allergies:  Patient has no known allergies. Review of Systems:   Constitutional: there has been no unanticipated weight loss. No change in energy or activity level   Eyes: No visual changes   ENT: No Headaches, hearing loss or vertigo. No mouth sores or sore throat. Cardiovascular: Reviewed in HPI  Respiratory: No cough or wheezing, no sputum production. Gastrointestinal: No abdominal pain, appetite loss, blood in stools. No change in bowel or bladder habits. Genitourinary: No nocturia, dysuria, trouble voiding  Musculoskeletal:  No gait disturbance, weakness or joint complaints. Integumentary: No rash or pruritis. Neurological: No headache, change in muscle strength, numbness or tingling. No change in gait, balance, coordination, mood, affect, memory, mentation, behavior. Psychiatric: No anxiety or depression  Endocrine: No malaise or fever  Hematologic/Lymphatic: No abnormal bruising or bleeding, blood clots or swollen lymph nodes. Allergic/Immunologic: No nasal congestion or hives. Physical Examination:    Vitals:    08/26/22 1027 08/26/22 1034   BP: (!) 144/80 138/80   Site: Left Upper Arm Left Upper Arm   Position: Sitting Sitting   Cuff Size: Large Adult Large Adult   Pulse: 67    SpO2: 97%    Weight: 280 lb (127 kg)    Height: 6' 1\" (1.854 m)        Body mass index is 36.94 kg/m².      Wt Readings from Last 3 Encounters:   08/26/22 280 lb (127 kg)   04/21/22 286 lb (129.7 kg)   01/18/22 287 lb (130.2 kg)      BP Readings from Last 3 Encounters:   08/26/22 138/80   04/21/22 (!) 150/90   01/18/22 (!) 156/82      Constitutional and General Appearance:  appears stated age,   Eyes - no xanthelasma  Respiratory:  Normal excursion and expansion without use of accessory muscles  Resp Auscultation: Normal breath sounds without dullness  Cardiovascular: port in the left chest  The apical impulses not displaced  Heart is regular rate and rhythm with normal S1, S2, port left chest   PMI is normal  The carotid upstroke is normal, no bruit noted   JVP is not elevated  Peripheral pulses are symmetrical  There is no clubbing, cyanosis of the extremities  No edema BLE  No varicosities  Femoral Arteries: 2+ and equal without bruits  Pedal Pulses: 2+ and equal   Abdomen:  No masses or tenderness  Aorta not palpable  Normal bowel sounds  Neurological/Psychiatric:  Alert and oriented x3  Moves all extremities well  Exhibits normal gait balance and coordination      Assessment/Plan  1. Coronary artery disease involving native coronary artery of native heart without angina pectoris -stable  Stress echo 2/17: Normal augmentation of all segments. EF 60%. No regional wall motion abnormalities are seen. moderate concentric left ventricular hypertrophy. E/e\"= 14. Mild mitral regurgitation. Aortic valve appears sclerotic but opens adequately. No stenosis. Mild aortic regurgitation is present. Pressure half-time is calculated at 613 msec. Mild tricuspid regurgitation with RVSP estimated at 28 mmHg  2004 cath-- Taxus stents distal/prox RCA; OM disease    2. HTN (hypertension), benign - stable here    3. Hyperlipidemia - stable on labs 5/21/21: ; TRIG 78; HDL 46; LDL 79, lipitor 20mg daily      A1c 4/21/22> 7.1     PLAN:  Will repeat lipids and CMP. No med changes. Encouraged to continue regular exercise. FU 6 months. Scribe's attestation: This note was scribed in the presence of Dr Kailey Bee MD by Evans Murray, ALEXIS. The scribe's documentation has been prepared under my direction and personally reviewed by me in its entirety. I confirm that the note above accurately reflects all work, treatment, procedures, and medical decision making performed by me. Thank you for allowing to me to participate in the care of [facility].

## 2022-10-04 ENCOUNTER — NURSE ONLY (OUTPATIENT)
Dept: PRIMARY CARE CLINIC | Age: 72
End: 2022-10-04
Payer: MEDICARE

## 2022-10-04 DIAGNOSIS — Z23 NEED FOR INFLUENZA VACCINATION: Primary | ICD-10-CM

## 2022-10-04 PROCEDURE — G0008 ADMIN INFLUENZA VIRUS VAC: HCPCS | Performed by: INTERNAL MEDICINE

## 2022-10-04 PROCEDURE — 90694 VACC AIIV4 NO PRSRV 0.5ML IM: CPT | Performed by: INTERNAL MEDICINE

## 2022-10-04 RX ORDER — VALSARTAN AND HYDROCHLOROTHIAZIDE 320; 12.5 MG/1; MG/1
1 TABLET, FILM COATED ORAL DAILY
Qty: 90 TABLET | Refills: 3 | Status: SHIPPED | OUTPATIENT
Start: 2022-10-04

## 2022-11-18 ENCOUNTER — HOSPITAL ENCOUNTER (OUTPATIENT)
Dept: CT IMAGING | Age: 72
Discharge: HOME | End: 2022-11-18
Payer: MEDICARE

## 2022-11-18 DIAGNOSIS — C18.9 ADENOCARCINOMA OF COLON (HCC): ICD-10-CM

## 2022-11-18 PROCEDURE — 6360000004 HC RX CONTRAST MEDICATION: Performed by: INTERNAL MEDICINE

## 2022-11-18 PROCEDURE — 74177 CT ABD & PELVIS W/CONTRAST: CPT

## 2022-11-18 RX ADMIN — IOPAMIDOL 75 ML: 755 INJECTION, SOLUTION INTRAVENOUS at 18:35

## 2022-11-18 RX ADMIN — DIATRIZOATE MEGLUMINE AND DIATRIZOATE SODIUM 20 ML: 660; 100 LIQUID ORAL; RECTAL at 18:35

## 2023-02-01 NOTE — PROGRESS NOTES
[address]   Cardiac Evaluation      Patient: Eduardo Zuniga  YOB: 1950  Date: 2/8/23       Chief Complaint   Patient presents with    Coronary Artery Disease    Hyperlipidemia    Hypertension            Referring provider: Josee Regalado MD    History of Present Illness:   Mr Wilson Burr is seen today in follow up. He has a h/o CAD, HTN, ANTONIETTA, and hyperlipidemia. 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 Mr Wilson Burr has been diagnosed with adenocarcinoma of the colon. He underwent laparoscopic partial colectomy 9/3/21 with Dr Clint Shine and received 12 rounds of chemotherapy. He recently had a CT that was negative for metastatic disease. Mr Wilson Burr states he has been feeling well. He denies chest pain, palpitations, dizziness, or edema. He has been checking b/p at home and states readings range 130's-140's/80's. He has numbness/tingling in his hands and arms; he has had this since he had chemo. He has episodes where he can hear his heart beating after going up stairs. He exercises, but not consistently. Past Medical History:   has a past medical history of CAD (coronary artery disease), Colon cancer ([address] Coronary artery disease involving native coronary artery of native heart without angina pectoris, Diabetes mellitus with microalbuminuric diabetic nephropathy ([address] Diverticulosis of colon, DM type 2 (diabetes mellitus, type 2) ([address] ED (erectile dysfunction), Epistaxis, Homocysteinemia, HTN (hypertension), benign, Hyperlipidemia LDL goal < 70, Low testosterone, MI (myocardial infarction) ([address] Neurofibromatosis ([address] ANTONIETTA (obstructive sleep apnea), PSA elevation, Sensorineural hearing loss, bilateral, and Vitamin D deficiency. Surgical History:   has a past surgical history that includes Coronary angioplasty with stent (7/12/2004); nasal hemorrhage control (1/1/2012); Leg Surgery (Left, ~2001); Cardiac surgery (2004); Colonoscopy (01/01/2006);  Colonoscopy (N/A, 08/31/2018); Colonoscopy (N/A, 8/16/2021); Colonoscopy (8/16/2021); Colonoscopy (8/16/2021); colectomy (N/A, 9/3/2021); and Port Surgery (N/A, 10/5/2021). Current Outpatient Medications   Medication Sig Dispense Refill    valsartan-hydroCHLOROthiazide (DIOVAN-HCT) 320-12.5 MG per tablet TAKE 1 TABLET BY MOUTH DAILY 90 tablet 3    tamsulosin (FLOMAX) 0.4 MG capsule       cloNIDine (CATAPRES) 0.1 MG tablet TAKE 1 TABLET BY MOUTH TWICE DAILY 180 tablet 1    carvedilol (COREG) 25 MG tablet 1 po bid 180 tablet 3    atorvastatin (LIPITOR) 20 MG tablet TAKE 1 TABLET BY MOUTH EVERY DAY 90 tablet 3    amLODIPine (NORVASC) 10 MG tablet TAKE 1 TABLET BY MOUTH EVERY DAY 90 tablet 3    travoprost, benzalkonium, (TRAVATAN) 0.004 % ophthalmic solution Place 1 drop into both eyes nightly. aspirin 81 MG tablet Take 81 mg by mouth daily. therapeutic multivitamin-minerals (THERAGRAN-M) tablet Take 1 tablet by mouth daily. sildenafil (VIAGRA) 50 MG tablet Take 1 tablet by mouth daily as needed for Erectile Dysfunction (Patient not taking: Reported on 12/20/2021) 10 tablet 3    ONETOUCH DELICA LANCETS 94B MISC Test blood sugar twice daily. 3    glucose blood VI test strips (ASCENSIA AUTODISC VI;ONE TOUCH ULTRA TEST VI) strip 1 each by In Vitro route 2 times daily. As needed. 100 each 3    Blood Glucose Monitoring Suppl (BLOOD GLUCOSE METER) KIT 1 Device by Does not apply route 2 times daily. 1 kit 0     No current facility-administered medications for this visit.        Social History:  Social History     Social History    Marital status:      Spouse name: Marc Greene    Number of children: 2    Years of education: 25     Occupational History    semi-retired       outside consulting     Social History Main Topics    Smoking status: Former Smoker     Types: Pipe     Quit date: 1/1/1976    Smokeless tobacco: Never Used      Comment: [age] year 2 bowls /day    Alcohol use 0.0 oz/week      Comment: Wine occasionally ie wedding/business event. Drug use: No      Comment: Tried MJ / hash. Sexual activity: Not on file     Other Topics Concern    From [facility] [place] and appreciates [employer] cooking. Social History Narrative    Always brings interesting new candies from the [employer]. Purple M/Ms. I pound snicker bar. Interesting popcorn in the past. Rad Finney and Ms. Family History:  family history includes High Blood Pressure in his father and mother; Hypertension in his brother; Garcia Piggs in his father; No Known Problems in his son; Other in his brother; Stroke in his brother. Allergies:  Patient has no known allergies. Review of Systems:   Constitutional: there has been no unanticipated weight loss. No change in energy or activity level   Eyes: No visual changes   ENT: No Headaches, hearing loss or vertigo. No mouth sores or sore throat. Cardiovascular: Reviewed in HPI  Respiratory: No cough or wheezing, no sputum production. Gastrointestinal: No abdominal pain, appetite loss, blood in stools. No change in bowel or bladder habits. Genitourinary: No nocturia, dysuria, trouble voiding  Musculoskeletal:  No gait disturbance, weakness or joint complaints. Integumentary: No rash or pruritis. Neurological: No headache, change in muscle strength, numbness or tingling. No change in gait, balance, coordination, mood, affect, memory, mentation, behavior. Psychiatric: No anxiety or depression  Endocrine: No malaise or fever  Hematologic/Lymphatic: No abnormal bruising or bleeding, blood clots or swollen lymph nodes. Allergic/Immunologic: No nasal congestion or hives.     Physical Examination:    Vitals:    02/08/23 1430 02/08/23 1437   BP: (!) 148/80 (!) 154/82   Site: Left Upper Arm Right Lower Arm   Position: Sitting Sitting   Cuff Size: Large Adult Large Adult   Pulse: 68    SpO2: 99%    Weight: 279 lb (126.6 kg)    Height: 6' 1\" (1.854 m)          Body mass index is 36.81 kg/m². Wt Readings from Last 3 Encounters:   02/08/23 279 lb (126.6 kg)   02/03/23 280 lb (127 kg)   08/26/22 280 lb (127 kg)      BP Readings from Last 3 Encounters:   02/08/23 (!) 154/82   02/03/23 138/86   08/26/22 138/80      Constitutional and General Appearance:  appears stated age,   Eyes - no xanthelasma  Respiratory:  Normal excursion and expansion without use of accessory muscles  Resp Auscultation: Normal breath sounds without dullness  Cardiovascular: port in the left chest  The apical impulses not displaced  Heart is regular rate and rhythm with normal S1, S2, port left chest   PMI is normal  The carotid upstroke is normal, no bruit noted   JVP is not elevated  Peripheral pulses are symmetrical  There is no clubbing, cyanosis of the extremities  No edema BLE  No varicosities  Femoral Arteries: 2+ and equal without bruits  Pedal Pulses: 2+ and equal   Abdomen:  No masses or tenderness  Aorta not palpable  Normal bowel sounds  Neurological/Psychiatric:  Alert and oriented x3  Moves all extremities well  Exhibits normal gait balance and coordination      Assessment/Plan  1. Coronary artery disease involving native coronary artery of native heart without angina pectoris -stable, on BB and aspirin  Stress echo 2/17: Normal augmentation of all segments. EF 60%. No regional wall motion abnormalities are seen. moderate concentric left ventricular hypertrophy. E/e\"= 14. Mild mitral regurgitation. Aortic valve appears sclerotic but opens adequately. No stenosis. Mild aortic regurgitation is present. Pressure half-time is calculated at 613 msec. Mild tricuspid regurgitation with RVSP estimated at 28 mmHg  2004 cath-- Taxus stents distal/prox RCA; OM disease    2. HTN (hypertension), essential- stable on Diovan/HCT, Clonidine, Amlodipine   3. Hyperlipidemia - stable on labs 5/21/21: ; TRIG 78;  HDL 46; LDL 79, lipitor 20mg daily  He has orders for lipids to be done      A1c 4/21/22> 7.1     PLAN: Stable cardiac status. No med changes. Encouraged to do regular exercise. FU 6 months. Scribe's attestation: This note was scribed in the presence of Dr Justen Hollis MD by Evin Astudillo RN. The scribe's documentation has been prepared under my direction and personally reviewed by me in its entirety. I confirm that the note above accurately reflects all work, treatment, procedures, and medical decision making performed by me. Thank you for allowing to me to participate in the care of Lisandra Isaacs.

## 2023-02-02 SDOH — ECONOMIC STABILITY: INCOME INSECURITY: HOW HARD IS IT FOR YOU TO PAY FOR THE VERY BASICS LIKE FOOD, HOUSING, MEDICAL CARE, AND HEATING?: NOT HARD AT ALL

## 2023-02-02 SDOH — ECONOMIC STABILITY: FOOD INSECURITY: WITHIN THE PAST 12 MONTHS, THE FOOD YOU BOUGHT JUST DIDN'T LAST AND YOU DIDN'T HAVE MONEY TO GET MORE.: NEVER TRUE

## 2023-02-02 SDOH — HEALTH STABILITY: PHYSICAL HEALTH: ON AVERAGE, HOW MANY MINUTES DO YOU ENGAGE IN EXERCISE AT THIS LEVEL?: 20 MIN

## 2023-02-02 SDOH — HEALTH STABILITY: PHYSICAL HEALTH: ON AVERAGE, HOW MANY DAYS PER WEEK DO YOU ENGAGE IN MODERATE TO STRENUOUS EXERCISE (LIKE A BRISK WALK)?: 1 DAY

## 2023-02-02 SDOH — ECONOMIC STABILITY: FOOD INSECURITY: WITHIN THE PAST 12 MONTHS, YOU WORRIED THAT YOUR FOOD WOULD RUN OUT BEFORE YOU GOT MONEY TO BUY MORE.: NEVER TRUE

## 2023-02-02 SDOH — ECONOMIC STABILITY: HOUSING INSECURITY
IN THE LAST 12 MONTHS, WAS THERE A TIME WHEN YOU DID NOT HAVE A STEADY PLACE TO SLEEP OR SLEPT IN A SHELTER (INCLUDING NOW)?: NO

## 2023-02-02 ASSESSMENT — PATIENT HEALTH QUESTIONNAIRE - PHQ9
SUM OF ALL RESPONSES TO PHQ QUESTIONS 1-9: 0
1. LITTLE INTEREST OR PLEASURE IN DOING THINGS: 0
SUM OF ALL RESPONSES TO PHQ9 QUESTIONS 1 & 2: 0
SUM OF ALL RESPONSES TO PHQ QUESTIONS 1-9: 0
2. FEELING DOWN, DEPRESSED OR HOPELESS: 0
SUM OF ALL RESPONSES TO PHQ QUESTIONS 1-9: 0
SUM OF ALL RESPONSES TO PHQ QUESTIONS 1-9: 0

## 2023-02-02 ASSESSMENT — LIFESTYLE VARIABLES
HOW OFTEN DO YOU HAVE A DRINK CONTAINING ALCOHOL: MONTHLY OR LESS
HOW OFTEN DO YOU HAVE SIX OR MORE DRINKS ON ONE OCCASION: 1
HOW MANY STANDARD DRINKS CONTAINING ALCOHOL DO YOU HAVE ON A TYPICAL DAY: 1 OR 2
HOW OFTEN DO YOU HAVE A DRINK CONTAINING ALCOHOL: 2
HOW MANY STANDARD DRINKS CONTAINING ALCOHOL DO YOU HAVE ON A TYPICAL DAY: 1

## 2023-02-03 ENCOUNTER — OFFICE VISIT (OUTPATIENT)
Dept: PRIMARY CARE CLINIC | Age: 73
End: 2023-02-03
Payer: MEDICARE

## 2023-02-03 VITALS
TEMPERATURE: 97.6 F | DIASTOLIC BLOOD PRESSURE: 86 MMHG | OXYGEN SATURATION: 98 % | RESPIRATION RATE: 14 BRPM | HEART RATE: 72 BPM | SYSTOLIC BLOOD PRESSURE: 138 MMHG | WEIGHT: 280 LBS | BODY MASS INDEX: 36.94 KG/M2

## 2023-02-03 DIAGNOSIS — N18.31 TYPE 2 DIABETES MELLITUS WITH STAGE 3A CHRONIC KIDNEY DISEASE, WITHOUT LONG-TERM CURRENT USE OF INSULIN (HCC): ICD-10-CM

## 2023-02-03 DIAGNOSIS — Z00.00 MEDICARE ANNUAL WELLNESS VISIT, SUBSEQUENT: Primary | ICD-10-CM

## 2023-02-03 DIAGNOSIS — E11.22 TYPE 2 DIABETES MELLITUS WITH STAGE 3A CHRONIC KIDNEY DISEASE, WITHOUT LONG-TERM CURRENT USE OF INSULIN (HCC): ICD-10-CM

## 2023-02-03 DIAGNOSIS — E66.01 MORBIDLY OBESE (HCC): ICD-10-CM

## 2023-02-03 DIAGNOSIS — I25.10 CORONARY ARTERY DISEASE INVOLVING NATIVE CORONARY ARTERY OF NATIVE HEART WITHOUT ANGINA PECTORIS: Chronic | ICD-10-CM

## 2023-02-03 DIAGNOSIS — N18.31 TYPE 2 DIABETES MELLITUS WITH STAGE 3A CHRONIC KIDNEY DISEASE, WITH LONG-TERM CURRENT USE OF INSULIN (HCC): ICD-10-CM

## 2023-02-03 DIAGNOSIS — Z79.4 TYPE 2 DIABETES MELLITUS WITH STAGE 3A CHRONIC KIDNEY DISEASE, WITH LONG-TERM CURRENT USE OF INSULIN (HCC): ICD-10-CM

## 2023-02-03 DIAGNOSIS — E11.22 TYPE 2 DIABETES MELLITUS WITH STAGE 3A CHRONIC KIDNEY DISEASE, WITH LONG-TERM CURRENT USE OF INSULIN (HCC): ICD-10-CM

## 2023-02-03 DIAGNOSIS — C18.9 ADENOCARCINOMA OF COLON (HCC): ICD-10-CM

## 2023-02-03 DIAGNOSIS — E11.21 DIABETES MELLITUS WITH MICROALBUMINURIC DIABETIC NEPHROPATHY (HCC): Chronic | ICD-10-CM

## 2023-02-03 DIAGNOSIS — E72.11 HOMOCYSTINURIA (HCC): ICD-10-CM

## 2023-02-03 DIAGNOSIS — I10 HTN (HYPERTENSION), BENIGN: Chronic | ICD-10-CM

## 2023-02-03 DIAGNOSIS — Z12.5 SCREENING FOR PROSTATE CANCER: ICD-10-CM

## 2023-02-03 DIAGNOSIS — E11.9 TYPE 2 DIABETES MELLITUS WITHOUT COMPLICATION, WITHOUT LONG-TERM CURRENT USE OF INSULIN (HCC): Chronic | ICD-10-CM

## 2023-02-03 DIAGNOSIS — E66.01 SEVERE OBESITY (BMI 35.0-39.9) WITH COMORBIDITY (HCC): ICD-10-CM

## 2023-02-03 PROCEDURE — 3079F DIAST BP 80-89 MM HG: CPT | Performed by: INTERNAL MEDICINE

## 2023-02-03 PROCEDURE — 3046F HEMOGLOBIN A1C LEVEL >9.0%: CPT | Performed by: INTERNAL MEDICINE

## 2023-02-03 PROCEDURE — 3075F SYST BP GE 130 - 139MM HG: CPT | Performed by: INTERNAL MEDICINE

## 2023-02-03 PROCEDURE — G0439 PPPS, SUBSEQ VISIT: HCPCS | Performed by: INTERNAL MEDICINE

## 2023-02-03 PROCEDURE — 3017F COLORECTAL CA SCREEN DOC REV: CPT | Performed by: INTERNAL MEDICINE

## 2023-02-03 PROCEDURE — G8484 FLU IMMUNIZE NO ADMIN: HCPCS | Performed by: INTERNAL MEDICINE

## 2023-02-03 PROCEDURE — 1123F ACP DISCUSS/DSCN MKR DOCD: CPT | Performed by: INTERNAL MEDICINE

## 2023-02-03 NOTE — PATIENT INSTRUCTIONS
Advance Directives: Care Instructions  Overview  An advance directive is a legal way to state your wishes at the end of your life. It tells your family and your doctor what to do if you can't say what you want. There are two main types of advance directives. You can change them any time your wishes change. Living will. This form tells your family and your doctor your wishes about life support and other treatment. The form is also called a declaration. Medical power of . This form lets you name a person to make treatment decisions for you when you can't speak for yourself. This person is called a health care agent (health care proxy, health care surrogate). The form is also called a durable power of  for health care. If you do not have an advance directive, decisions about your medical care may be made by a family member, or by a doctor or a  who doesn't know you. It may help to think of an advance directive as a gift to the people who care for you. If you have one, they won't have to make tough decisions by themselves. For more information, including forms for your state, see the [address] website (www.caringinfo.org/planning/advance-directives/). Follow-up care is a key part of your treatment and safety. Be sure to make and go to all appointments, and call your doctor if you are having problems. It's also a good idea to know your test results and keep a list of the medicines you take. What should you include in an advance directive? Many states have a unique advance directive form. (It may ask you to address specific issues.) Or you might use a universal form that's approved by many states. If your form doesn't tell you what to address, it may be hard to know what to include in your advance directive. Use the questions below to help you get started. Who do you want to make decisions about your medical care if you are not able to?   What life-support measures do you want if you have a serious illness that gets worse over time or can't be cured? What are you most afraid of that might happen? (Maybe you're afraid of having pain, losing your independence, or being kept alive by machines.)  Where would you prefer to die? (Your home? A hospital? A nursing home?)  Do you want to donate your organs when you die? Do you want certain Hoahaoism practices performed before you die? When should you call for help? Be sure to contact your doctor if you have any questions. Where can you learn more? Go to http://www.hunt.com/ and enter R264 to learn more about \"Advance Directives: Care Instructions. \"  Current as of: June 16, 2022               Content Version: 13.5  © 2593-5747 Healthwise, [employer]. Care instructions adapted under license by [facility] ([facility]). If you have questions about a medical condition or this instruction, always ask your healthcare professional. [place] [address] any warranty or liability for your use of this information. Starting a Weight Loss Plan: Care Instructions  Overview     If you're thinking about losing weight, it can be hard to know where to start. Your doctor can help you set up a weight loss plan that best meets your needs. You may want to take a class on nutrition or exercise, or you could join a weight loss support group. If you have questions about how to make changes to your eating or exercise habits, ask your doctor about seeing a registered dietitian or an exercise specialist.  It can be a big challenge to lose weight. But you don't have to make huge changes at once. Make small changes, and stick with them. When those changes become habit, add a few more changes. If you don't think you're ready to make changes right now, try to pick a date in the future. Make an appointment to see your doctor to discuss whether the time is right for you to start a plan. Follow-up care is a key part of your treatment and safety.  Be sure to make and go to all appointments, and call your doctor if you are having problems. It's also a good idea to know your test results and keep a list of the medicines you take. How can you care for yourself at home? Set realistic goals. Many people expect to lose much more weight than is likely. A weight loss of 5% to 10% of your body weight may be enough to improve your health. Get family and friends involved to provide support. Talk to them about why you are trying to lose weight, and ask them to help. They can help by participating in exercise and having meals with you, even if they may be eating something different. Find what works best for you. If you do not have time or do not like to cook, a program that offers meal replacement bars or shakes may be better for you. Or if you like to prepare meals, finding a plan that includes daily menus and recipes may be best.  Ask your doctor about other health professionals who can help you achieve your weight loss goals. A dietitian can help you make healthy changes in your diet. An exercise specialist or  can help you develop a safe and effective exercise program.  A counselor or psychiatrist can help you cope with issues such as depression, anxiety, or family problems that can make it hard to focus on weight loss. Consider joining a support group for people who are trying to lose weight. Your doctor can suggest groups in your area. Where can you learn more? Go to http://www.woods.com/ and enter U357 to learn more about \"Starting a Weight Loss Plan: Care Instructions. \"  Current as of: August 25, 2022               Content Version: 13.5  © 2006-2022 Healthwise, Inc[employer]. Care instructions adapted under license by [facility] ([facility]).  If you have questions about a medical condition or this instruction, always ask your healthcare professional. [address] any warranty or liability for your use of this information. A Healthy Heart: Care Instructions  Your Care Instructions     Coronary artery disease, also called heart disease, occurs when a substance called plaque builds up in the vessels that supply oxygen-rich blood to your heart muscle. This can narrow the blood vessels and reduce blood flow. A heart attack happens when blood flow is completely blocked. A high-fat diet, smoking, and other factors increase the risk of heart disease. Your doctor has found that you have a chance of having heart disease. You can do lots of things to keep your heart healthy. It may not be easy, but you can change your diet, exercise more, and quit smoking. These steps really work to lower your chance of heart disease. Follow-up care is a key part of your treatment and safety. Be sure to make and go to all appointments, and call your doctor if you are having problems. It's also a good idea to know your test results and keep a list of the medicines you take. How can you care for yourself at home? Diet    Use less salt when you cook and eat. This helps lower your blood pressure. Taste food before salting. Add only a little salt when you think you need it. With time, your taste buds will adjust to less salt.     Eat fewer snack items, fast foods, canned soups, and other high-salt, high-fat, processed foods.     Read food labels and try to avoid saturated and trans fats. They increase your risk of heart disease by raising cholesterol levels.     Limit the amount of solid fat-butter, margarine, and shortening-you eat. Use olive, peanut, or canola oil when you cook. Bake, broil, and steam foods instead of frying them.     Eat a variety of fruit and vegetables every day. Dark green, deep orange, red, or yellow fruits and vegetables are especially good for you. Examples include spinach, carrots, peaches, and berries.     Foods high in fiber can reduce your cholesterol and provide important vitamins and minerals.  High-fiber foods include whole-grain cereals and breads, oatmeal, beans, brown rice, citrus fruits, and apples.     Eat lean proteins. Heart-healthy proteins include seafood, lean meats and poultry, eggs, beans, peas, nuts, seeds, and soy products.     Limit drinks and foods with added sugar. These include candy, desserts, and soda pop.   Lifestyle changes    If your doctor recommends it, get more exercise. Walking is a good choice. Bit by bit, increase the amount you walk every day. Try for at least 30 minutes on most days of the week. You also may want to swim, bike, or do other activities.     Do not smoke. If you need help quitting, talk to your doctor about stop-smoking programs and medicines. These can increase your chances of quitting for good. Quitting smoking may be the most important step you can take to protect your heart. It is never too late to quit.     Limit alcohol to 2 drinks a day for men and 1 drink a day for women. Too much alcohol can cause health problems.     Manage other health problems such as diabetes, high blood pressure, and high cholesterol. If you think you may have a problem with alcohol or drug use, talk to your doctor.   Medicines    Take your medicines exactly as prescribed. Call your doctor if you think you are having a problem with your medicine.     If your doctor recommends aspirin, take the amount directed each day. Make sure you take aspirin and not another kind of pain reliever, such as acetaminophen (Tylenol).   When should you call for help?   Call 911 if you have symptoms of a heart attack. These may include:    Chest pain or pressure, or a strange feeling in the chest.     Sweating.     Shortness of breath.     Pain, pressure, or a strange feeling in the back, neck, jaw, or upper belly or in one or both shoulders or arms.     Lightheadedness or sudden weakness.     A fast or irregular heartbeat.   After you call 911, the  may tell you to chew 1 adult-strength or 2 to 4  low-dose aspirin. Wait for an ambulance. Do not try to drive yourself. Watch closely for changes in your health, and be sure to contact your doctor if you have any problems. Where can you learn more? Go to http://www.hunt.com/ and enter F075 to learn more about \"A Healthy Heart: Care Instructions. \"  Current as of: September 7, 2022               Content Version: 13.5  © 2006-2022 [employer]. Care instructions adapted under license by [facility] ([facility]). If you have questions about a medical condition or this instruction, always ask your healthcare professional. [place] [address] any warranty or liability for your use of this information. Personalized Preventive Plan for Bobbi Goyal - 2/3/2023  Medicare offers a range of preventive health benefits. Some of the tests and screenings are paid in full while other may be subject to a deductible, co-insurance, and/or copay. Some of these benefits include a comprehensive review of your medical history including lifestyle, illnesses that may run in your family, and various assessments and screenings as appropriate. After reviewing your medical record and screening and assessments performed today your provider may have ordered immunizations, labs, imaging, and/or referrals for you. A list of these orders (if applicable) as well as your Preventive Care list are included within your After Visit Summary for your review. Other Preventive Recommendations:    A preventive eye exam performed by an eye specialist is recommended every 1-2 years to screen for glaucoma; cataracts, macular degeneration, and other eye disorders. A preventive dental visit is recommended every 6 months. Try to get at least 150 minutes of exercise per week or 10,000 steps per day on a pedometer . Order or download the FREE \"Exercise & Physical Activity: Your Everyday Guide\" from [employer].  Call 5-595.858.2388 or search [employer] online. You need 3838-6935 mg of calcium and 2559-9577 IU of vitamin D per day. It is possible to meet your calcium requirement with diet alone, but a vitamin D supplement is usually necessary to meet this goal.  When exposed to the sun, use a sunscreen that protects against both UVA and UVB radiation with an SPF of 30 or greater. Reapply every 2 to 3 hours or after sweating, drying off with a towel, or swimming. Always wear a seat belt when traveling in a car. Always wear a helmet when riding a bicycle or motorcycle.

## 2023-02-03 NOTE — PROGRESS NOTES
Medicare Annual Wellness Visit    Lisa Dubose is here for Medicare AWV    Assessment & Plan    Recommendations for Preventive Services Due: see orders and patient instructions/AVS.  Recommended screening schedule for the next 5-10 years is provided to the patient in written form: see Patient Instructions/AVS.     No follow-ups on file. Subjective   The following acute and/or chronic problems were also addressed today:  DM type 2 (diabetes mellitus, type 2) ([facility] [address]  Diabetes Mellitus Type II:  Home blood sugar records reviewed: fasting range: 120-130. No significant episodes of hypoglycemia. Compliant with medications. No polyuria, polydipsia, visual changes, foot problems, GI upset. Diabetic diet compliance:  compliant most of the time. Current exercise: none. Will check labs, and refill medications as appropriate. Hypertension:  Denies CP, SOB, visual changes, dizziness, palpitations or HA. He is adherent to a low sodium diet. Blood pressure typically runs ok outside of the office. Continue current medications. Hyperlipidemia:  No new myalgias or GI upset on current medications. Lab Results   Component Value Date    LABA1C 7.1 04/21/2022    LABA1C 6.7 08/14/2021    LABA1C 6.6 05/17/2021     Lab Results   Component Value Date    LABMICR 12.90 (H) 05/21/2021    CREATININE 1.5 (H) 09/05/2021     Lab Results   Component Value Date    ALT 13 08/17/2021    AST 15 08/17/2021     No components found for: CHLPL  Lab Results   Component Value Date    TRIG 78 05/21/2021     Lab Results   Component Value Date    HDL 46 05/21/2021     Lab Results   Component Value Date/Time    LDLCALC 79 05/21/2021 08:42 AM      This problem is stable, reviewed in detail, and advised patient to continue the current instructions or medications. Will continue to closely monitor the situation. HTN (hypertension), benign  This is a chronic problem. The problem is well controlled. Patient monitors readings regularly. Pertinent negatives include no chest pain, focal sensory loss, focal weakness, leg pain, myalgias or shortness of breath. No headaches or chest pain. Takes medications regularly. Blood pressure has been stable, blood work was reviewed, and advised patient to continue the current instructions or medications. [facility]  Has been stable. Diabetes mellitus with microalbuminuric diabetic nephropathy ([facility]  This problem was reviewed in detail. It is under control and stable. Will check blood work. Adenocarcinoma of colon [facility])  S/p surgery,  Considered under remission,     Morbidly obese ([facility]  Patient counseled,   Encouraged to lose weight, watch diet and exercise consistently. Coronary artery disease involving native coronary artery of native heart without angina pectoris  No cp or sob,  Patient is compliant w medications, no side effects, effective, provides adequate symptom relief. No new symptoms or problems as noted by patient. The problem is stable, no changes noted by patient. Will consider monitoring labs and refill medications as appropriate. Patient counseled and will continue current plan. Patient's complete Health Risk Assessment and screening values have been reviewed and are found in Flowsheets. The following problems were reviewed today and where indicated follow up appointments were made and/or referrals ordered.     Positive Risk Factor Screenings with Interventions:                 Weight and Activity:  Physical Activity: Insufficiently Active    Days of Exercise per Week: 1 day    Minutes of Exercise per Session: 20 min     On average, how many days per week do you engage in moderate to strenuous exercise (like a brisk walk)?: 1 day  Have you lost any weight without trying in the past 3 months?: No       Obesity Interventions:  Patient declines any further evaluation or treatment             Safety:  Do you have either shower bars, grab bars, non-slip mats or non-slip surfaces in your shower or bathtub?: (!) No    Interventions:  Patient declined any further interventions or treatment                     Objective   Vitals:    02/03/23 1056   BP: 138/86   Site: Left Upper Arm   Position: Sitting   Cuff Size: Medium Adult   Pulse: 72   Resp: 14   Temp: 97.6 °F (36.4 °C)   SpO2: 98%   Weight: 280 lb (127 kg)      Body mass index is 36.94 kg/m². General Appearance: alert and oriented to person, place and time, well developed and well- nourished, in no acute distress  Skin: warm and dry, no rash or erythema  Head: normocephalic and atraumatic  Eyes: pupils equal, round, and reactive to light, extraocular eye movements intact, conjunctivae normal  ENT: tympanic membrane, external ear and ear canal normal bilaterally, nose without deformity, nasal mucosa and turbinates normal without polyps  Neck: supple and non-tender without mass, no thyromegaly or thyroid nodules, no cervical lymphadenopathy  Pulmonary/Chest: clear to auscultation bilaterally- no wheezes, rales or rhonchi, normal air movement, no respiratory distress  Cardiovascular: normal rate, regular rhythm, normal S1 and S2, no murmurs, rubs, clicks, or gallops, distal pulses intact, no carotid bruits  Abdomen: soft, non-tender, non-distended, normal bowel sounds, no masses or organomegaly  Extremities: no cyanosis, clubbing or edema  Musculoskeletal: normal range of motion, no joint swelling, deformity or tenderness  Neurologic: reflexes normal and symmetric, no cranial nerve deficit, gait, coordination and speech normal       No Known Allergies  Prior to Visit Medications    Medication Sig Taking?  Authorizing Provider   valsartan-hydroCHLOROthiazide (DIOVAN-HCT) 320-12.5 MG per tablet TAKE 1 TABLET BY MOUTH DAILY Yes Justine Coy MD   tamsulosin (FLOMAX) 0.4 MG capsule  Yes Historical Provider, MD   cloNIDine (CATAPRES) 0.1 MG tablet TAKE 1 TABLET BY MOUTH TWICE DAILY Yes Jarvis Quintanilla MD   carvedilol (COREG) 25 MG tablet 1 po bid Yes Alex Howell MD   atorvastatin (LIPITOR) 20 MG tablet TAKE 1 TABLET BY MOUTH EVERY DAY Yes Alex Howell MD   amLODIPine (NORVASC) 10 MG tablet TAKE 1 TABLET BY MOUTH EVERY DAY Yes Alex Howell MD   [facility] LANCETS 45N MISC Test blood sugar twice daily. Yes Historical Provider, MD   travoprost, benzalkonium, (TRAVATAN) 0.004 % ophthalmic solution Place 1 drop into both eyes nightly. Yes Historical Provider, MD   glucose blood VI test strips (ASCENSIA AUTODISC VI;ONE TOUCH ULTRA TEST VI) strip 1 each by In Vitro route 2 times daily. As needed. Yes Wilfrid Garza DO   aspirin 81 MG tablet Take 81 mg by mouth daily. Yes Historical Provider, MD   therapeutic multivitamin-minerals (THERAGRAN-M) tablet Take 1 tablet by mouth daily. Yes Historical Provider, MD   sildenafil (VIAGRA) 50 MG tablet Take 1 tablet by mouth daily as needed for Erectile Dysfunction  Patient not taking: Reported on 12/20/2021  Kvng Sherwood MD   Blood Glucose Monitoring Suppl (BLOOD GLUCOSE METER) KIT 1 Device by Does not apply route 2 times daily.   Wilfrid Garza DO       CareTeam (Including outside providers/suppliers regularly involved in providing care):   Patient Care Team:  Kvng Sherwood MD as PCP - General (Internal Medicine)  Kvng Sherwood MD as PCP - Empaneled Provider  Bethany Vincent MD as Consulting Physician (Cardiology)  MACHO Austin CNP as Nurse Practitioner (Nurse Practitioner)  Alex Howell MD as Consulting Physician (Cardiology)     Reviewed and updated this visit:  Jere Sandoval MD

## 2023-02-03 NOTE — ASSESSMENT & PLAN NOTE
Diabetes Mellitus Type II:  Home blood sugar records reviewed: fasting range: 120-130. No significant episodes of hypoglycemia. Compliant with medications. No polyuria, polydipsia, visual changes, foot problems, GI upset. Diabetic diet compliance:  compliant most of the time. Current exercise: none. Will check labs, and refill medications as appropriate. Hypertension:  Denies CP, SOB, visual changes, dizziness, palpitations or HA. He is adherent to a low sodium diet. Blood pressure typically runs ok outside of the office. Continue current medications. Hyperlipidemia:  No new myalgias or GI upset on current medications. Lab Results   Component Value Date    LABA1C 7.1 04/21/2022    LABA1C 6.7 08/14/2021    LABA1C 6.6 05/17/2021     Lab Results   Component Value Date    LABMICR 12.90 (H) 05/21/2021    CREATININE 1.5 (H) 09/05/2021     Lab Results   Component Value Date    ALT 13 08/17/2021    AST 15 08/17/2021     No components found for: CHLPL  Lab Results   Component Value Date    TRIG 78 05/21/2021     Lab Results   Component Value Date    HDL 46 05/21/2021     Lab Results   Component Value Date/Time    LDLCALC 79 05/21/2021 08:42 AM      This problem is stable, reviewed in detail, and advised patient to continue the current instructions or medications. Will continue to closely monitor the situation.

## 2023-02-08 ENCOUNTER — OFFICE VISIT (OUTPATIENT)
Dept: CARDIOLOGY CLINIC | Age: 73
End: 2023-02-08
Payer: MEDICARE

## 2023-02-08 VITALS
HEART RATE: 68 BPM | OXYGEN SATURATION: 99 % | SYSTOLIC BLOOD PRESSURE: 154 MMHG | DIASTOLIC BLOOD PRESSURE: 82 MMHG | WEIGHT: 279 LBS | BODY MASS INDEX: 36.98 KG/M2 | HEIGHT: 73 IN

## 2023-02-08 DIAGNOSIS — I10 HTN (HYPERTENSION), BENIGN: ICD-10-CM

## 2023-02-08 DIAGNOSIS — I25.10 CORONARY ARTERY DISEASE INVOLVING NATIVE CORONARY ARTERY OF NATIVE HEART WITHOUT ANGINA PECTORIS: ICD-10-CM

## 2023-02-08 DIAGNOSIS — E78.5 HYPERLIPIDEMIA WITH TARGET LDL LESS THAN 70: Primary | ICD-10-CM

## 2023-02-08 PROCEDURE — 99214 OFFICE O/P EST MOD 30 MIN: CPT | Performed by: INTERNAL MEDICINE

## 2023-02-08 PROCEDURE — G8484 FLU IMMUNIZE NO ADMIN: HCPCS | Performed by: INTERNAL MEDICINE

## 2023-02-08 PROCEDURE — 3074F SYST BP LT 130 MM HG: CPT | Performed by: INTERNAL MEDICINE

## 2023-02-08 PROCEDURE — 1036F TOBACCO NON-USER: CPT | Performed by: INTERNAL MEDICINE

## 2023-02-08 PROCEDURE — 1123F ACP DISCUSS/DSCN MKR DOCD: CPT | Performed by: INTERNAL MEDICINE

## 2023-02-08 PROCEDURE — 3017F COLORECTAL CA SCREEN DOC REV: CPT | Performed by: INTERNAL MEDICINE

## 2023-02-08 PROCEDURE — G8417 CALC BMI ABV UP PARAM F/U: HCPCS | Performed by: INTERNAL MEDICINE

## 2023-02-08 PROCEDURE — 3078F DIAST BP <80 MM HG: CPT | Performed by: INTERNAL MEDICINE

## 2023-02-08 PROCEDURE — G8427 DOCREV CUR MEDS BY ELIG CLIN: HCPCS | Performed by: INTERNAL MEDICINE

## 2023-02-08 RX ORDER — CARVEDILOL 25 MG/1
TABLET ORAL
Qty: 180 TABLET | Refills: 3 | Status: SHIPPED | OUTPATIENT
Start: 2023-02-08

## 2023-02-08 RX ORDER — AMLODIPINE BESYLATE 10 MG/1
TABLET ORAL
Qty: 90 TABLET | Refills: 3 | Status: SHIPPED | OUTPATIENT
Start: 2023-02-08

## 2023-02-08 RX ORDER — ATORVASTATIN CALCIUM 20 MG/1
TABLET, FILM COATED ORAL
Qty: 90 TABLET | Refills: 3 | Status: SHIPPED | OUTPATIENT
Start: 2023-02-08

## 2023-02-27 RX ORDER — ATORVASTATIN CALCIUM 20 MG/1
TABLET, FILM COATED ORAL
Qty: 90 TABLET | Refills: 1 | Status: SHIPPED | OUTPATIENT
Start: 2023-02-27

## 2023-02-27 RX ORDER — CARVEDILOL 25 MG/1
TABLET ORAL
Qty: 180 TABLET | Refills: 3 | Status: SHIPPED | OUTPATIENT
Start: 2023-02-27

## 2023-03-13 LAB
AVERAGE GLUCOSE: NORMAL
CHOLESTEROL, TOTAL: 183 MG/DL
CHOLESTEROL/HDL RATIO: NORMAL
HBA1C MFR BLD: 7.4 %
HDLC SERPL-MCNC: 51 MG/DL (ref 35–70)
LDL CHOLESTEROL CALCULATED: 110 MG/DL (ref 0–160)
NONHDLC SERPL-MCNC: NORMAL MG/DL
TRIGL SERPL-MCNC: 111 MG/DL
VLDLC SERPL CALC-MCNC: NORMAL MG/DL

## 2023-04-17 RX ORDER — CLONIDINE HYDROCHLORIDE 0.1 MG/1
TABLET ORAL
Qty: 180 TABLET | Refills: 1 | Status: SHIPPED | OUTPATIENT
Start: 2023-04-17

## 2023-04-17 NOTE — TELEPHONE ENCOUNTER
Medication:   Requested Prescriptions     Pending Prescriptions Disp Refills    cloNIDine (CATAPRES) 0.1 MG tablet [Pharmacy Med Name: CLONIDINE 0.1MG TABLETS] 180 tablet 1     Sig: TAKE 1 TABLET BY MOUTH TWICE DAILY     Last Filled:  04/21/2022    Last appt: 2/3/2023   Next appt: Visit date not found    Last OARRS: No flowsheet data found.

## 2023-05-19 ENCOUNTER — OFFICE VISIT (OUTPATIENT)
Dept: PRIMARY CARE CLINIC | Age: 73
End: 2023-05-19

## 2023-05-19 VITALS
SYSTOLIC BLOOD PRESSURE: 158 MMHG | DIASTOLIC BLOOD PRESSURE: 96 MMHG | BODY MASS INDEX: 37.47 KG/M2 | OXYGEN SATURATION: 98 % | HEART RATE: 74 BPM | WEIGHT: 284 LBS | RESPIRATION RATE: 13 BRPM | TEMPERATURE: 97.6 F

## 2023-05-19 DIAGNOSIS — E78.5 HYPERLIPIDEMIA WITH TARGET LDL LESS THAN 70: Chronic | ICD-10-CM

## 2023-05-19 DIAGNOSIS — I25.10 CORONARY ARTERY DISEASE INVOLVING NATIVE CORONARY ARTERY OF NATIVE HEART WITHOUT ANGINA PECTORIS: Chronic | ICD-10-CM

## 2023-05-19 DIAGNOSIS — I10 HTN (HYPERTENSION), BENIGN: Primary | Chronic | ICD-10-CM

## 2023-05-19 DIAGNOSIS — C18.9 ADENOCARCINOMA OF COLON (HCC): ICD-10-CM

## 2023-05-19 DIAGNOSIS — E66.01 MORBIDLY OBESE (HCC): ICD-10-CM

## 2023-05-19 DIAGNOSIS — E72.11 HOMOCYSTINURIA (HCC): ICD-10-CM

## 2023-05-19 NOTE — PROGRESS NOTES
Subjective:      Patient ID: Chance Jackson is a 67 y.o. male. HPI  Established patient here for a visit to manage acute and chronic medical conditions as detailed below. HTN (hypertension), benign  This is a chronic problem. The problem is well controlled. Patient monitors readings regularly. Pertinent negatives include no chest pain, focal sensory loss, focal weakness, leg pain, myalgias or shortness of breath. No headaches or chest pain. Takes medications regularly. Blood pressure has been stable, blood work was reviewed, and advised patient to continue the current instructions or medications. Homocystinuria ([facility] [address]  Has been stable,  Continue current medications. Coronary artery disease involving native coronary artery of native heart without angina pectoris  No cp or sob,  Patient is compliant w medications, no side effects, effective, provides adequate symptom relief. No new symptoms or problems as noted by patient. The problem is stable, no changes noted by patient. Will consider monitoring labs and refill medications as appropriate. Patient counseled and will continue current plan. Adenocarcinoma of colon [facility])  Seeing oncology,   Stable in remission. Hyperlipidemia with target LDL less than 70  This has been a long standing problem, takes lipitor      Monitors diet and tries to follow a low fat diet. Has  been reasonably  compliant w exercise. Lipids have been stable, The problem is controlled. Recent lipid tests were reviewed and are normal. Pertinent negatives include no chest pain, focal sensory loss, focal weakness, leg pain, myalgias or shortness of breath. Advised patient to continue the current instructions or medications. Morbidly obese ([facility] [address]  Patient counseled,   Encouraged to lose weight, watch diet and exercise consistently. Review of Systems  ROS: No unusual headaches or allergy symptoms or blurred vision. No prolonged cough.  No flushing or facial pain or

## 2023-06-26 ENCOUNTER — COMMUNITY OUTREACH (OUTPATIENT)
Dept: PRIMARY CARE CLINIC | Age: 73
End: 2023-06-26

## 2023-06-27 RX ORDER — VALSARTAN AND HYDROCHLOROTHIAZIDE 320; 12.5 MG/1; MG/1
1 TABLET, FILM COATED ORAL DAILY
Qty: 90 TABLET | Refills: 3 | Status: SHIPPED | OUTPATIENT
Start: 2023-06-27

## 2023-06-30 ENCOUNTER — HOSPITAL ENCOUNTER (OUTPATIENT)
Dept: CT IMAGING | Age: 73
Discharge: HOME | End: 2023-06-30
Attending: INTERNAL MEDICINE
Payer: MEDICARE

## 2023-06-30 DIAGNOSIS — C18.9 ADENOCARCINOMA OF COLON (HCC): ICD-10-CM

## 2023-06-30 LAB
CREAT SERPL-MCNC: 1.2 MG/DL (ref 0.8–1.3)
GFR SERPLBLD CREATININE-BSD FMLA CKD-EPI: >60 ML/MIN/{1.73_M2}

## 2023-06-30 PROCEDURE — 6360000004 HC RX CONTRAST MEDICATION: Performed by: INTERNAL MEDICINE

## 2023-06-30 PROCEDURE — 74177 CT ABD & PELVIS W/CONTRAST: CPT

## 2023-06-30 PROCEDURE — 82565 ASSAY OF CREATININE: CPT

## 2023-06-30 PROCEDURE — 36415 COLL VENOUS BLD VENIPUNCTURE: CPT

## 2023-06-30 RX ADMIN — IOPAMIDOL 75 ML: 755 INJECTION, SOLUTION INTRAVENOUS at 14:48

## 2023-06-30 RX ADMIN — IOHEXOL 50 ML: 240 INJECTION, SOLUTION INTRATHECAL; INTRAVASCULAR; INTRAVENOUS; ORAL at 14:48

## 2023-08-03 NOTE — PROGRESS NOTES
Encouraged to continue regular exercise. FU 6 months. Scribe's attestation: This note was scribed in the presence of Dr Christian Talamantes MD by Kitty Bentley RN. The scribe's documentation has been prepared under my direction and personally reviewed by me in its entirety. I confirm that the note above accurately reflects all work, treatment, procedures, and medical decision making performed by me. Thank you for allowing to me to participate in the care of Shyam Heath.

## 2023-08-15 ENCOUNTER — OFFICE VISIT (OUTPATIENT)
Dept: CARDIOLOGY CLINIC | Age: 73
End: 2023-08-15
Payer: MEDICARE

## 2023-08-15 VITALS
HEIGHT: 73 IN | DIASTOLIC BLOOD PRESSURE: 78 MMHG | BODY MASS INDEX: 36.45 KG/M2 | SYSTOLIC BLOOD PRESSURE: 134 MMHG | HEART RATE: 90 BPM | OXYGEN SATURATION: 97 % | WEIGHT: 275 LBS

## 2023-08-15 DIAGNOSIS — I25.10 CORONARY ARTERY DISEASE INVOLVING NATIVE CORONARY ARTERY OF NATIVE HEART WITHOUT ANGINA PECTORIS: ICD-10-CM

## 2023-08-15 DIAGNOSIS — I10 HTN (HYPERTENSION), BENIGN: ICD-10-CM

## 2023-08-15 DIAGNOSIS — E78.5 HYPERLIPIDEMIA WITH TARGET LDL LESS THAN 70: Primary | ICD-10-CM

## 2023-08-15 PROCEDURE — G8427 DOCREV CUR MEDS BY ELIG CLIN: HCPCS | Performed by: INTERNAL MEDICINE

## 2023-08-15 PROCEDURE — G8417 CALC BMI ABV UP PARAM F/U: HCPCS | Performed by: INTERNAL MEDICINE

## 2023-08-15 PROCEDURE — 3075F SYST BP GE 130 - 139MM HG: CPT | Performed by: INTERNAL MEDICINE

## 2023-08-15 PROCEDURE — 3078F DIAST BP <80 MM HG: CPT | Performed by: INTERNAL MEDICINE

## 2023-08-15 PROCEDURE — 3017F COLORECTAL CA SCREEN DOC REV: CPT | Performed by: INTERNAL MEDICINE

## 2023-08-15 PROCEDURE — 99214 OFFICE O/P EST MOD 30 MIN: CPT | Performed by: INTERNAL MEDICINE

## 2023-08-15 PROCEDURE — 1123F ACP DISCUSS/DSCN MKR DOCD: CPT | Performed by: INTERNAL MEDICINE

## 2023-08-15 PROCEDURE — 1036F TOBACCO NON-USER: CPT | Performed by: INTERNAL MEDICINE

## 2023-08-15 RX ORDER — CLONIDINE HYDROCHLORIDE 0.1 MG/1
0.1 TABLET ORAL 2 TIMES DAILY
Qty: 180 TABLET | Refills: 3 | Status: SHIPPED | OUTPATIENT
Start: 2023-08-15

## 2023-08-15 RX ORDER — VALSARTAN AND HYDROCHLOROTHIAZIDE 320; 12.5 MG/1; MG/1
1 TABLET, FILM COATED ORAL DAILY
Qty: 90 TABLET | Refills: 3 | Status: SHIPPED | OUTPATIENT
Start: 2023-08-15

## 2023-08-15 RX ORDER — SEMAGLUTIDE 1.34 MG/ML
INJECTION, SOLUTION SUBCUTANEOUS
COMMUNITY
Start: 2023-06-05

## 2023-08-15 RX ORDER — AMLODIPINE BESYLATE 10 MG/1
TABLET ORAL
Qty: 90 TABLET | Refills: 3 | Status: SHIPPED | OUTPATIENT
Start: 2023-08-15

## 2023-08-15 RX ORDER — ATORVASTATIN CALCIUM 40 MG/1
40 TABLET, FILM COATED ORAL DAILY
Qty: 90 TABLET | Refills: 3 | Status: SHIPPED | OUTPATIENT
Start: 2023-08-15

## 2023-08-15 RX ORDER — CARVEDILOL 25 MG/1
TABLET ORAL
Qty: 180 TABLET | Refills: 3 | Status: SHIPPED | OUTPATIENT
Start: 2023-08-15

## 2023-09-26 ENCOUNTER — NURSE ONLY (OUTPATIENT)
Dept: PRIMARY CARE CLINIC | Age: 73
End: 2023-09-26

## 2023-09-26 DIAGNOSIS — Z23 NEED FOR INFLUENZA VACCINATION: Primary | ICD-10-CM

## 2023-10-03 NOTE — ASSESSMENT & PLAN NOTE
Seeing oncology,  Patient is compliant w medications, no side effects, effective, provides adequate symptom relief. No new symptoms or problems as noted by patient. The problem is stable, no changes noted by patient. Will consider monitoring labs and refill medications as appropriate. Patient counseled and will continue current plan. -Heparin SQ BID for DVT PPx.   -C/w home PPI.   -C/w home eye drops for glaucoma.   -Patient is DNR/DNI - MOLST in chart.     8. Discussed plan with patient's wife Rajni and RN and -Heparin SQ BID for DVT PPx.   -C/w home PPI.   -C/w home eye drops for glaucoma.   -Patient is DNR/DNI - MOLST in chart.     8. Discussed plan with patient's wife Rajni and RN and JOSHUA Gandhi.

## 2023-10-24 RX ORDER — ATORVASTATIN CALCIUM 20 MG/1
TABLET, FILM COATED ORAL
Qty: 90 TABLET | Refills: 3 | Status: SHIPPED | OUTPATIENT
Start: 2023-10-24

## 2023-10-24 NOTE — TELEPHONE ENCOUNTER
8/15/2023 OV with [facility]    3/13/2023 lipids done    Pt is on the recall list for his next OV

## 2023-11-07 RX ORDER — SEMAGLUTIDE 1.34 MG/ML
INJECTION, SOLUTION SUBCUTANEOUS
Qty: 3 ML | Refills: 5 | Status: SHIPPED | OUTPATIENT
Start: 2023-11-07

## 2023-11-07 NOTE — TELEPHONE ENCOUNTER
Medication:   Requested Prescriptions     Pending Prescriptions Disp Refills    OZEMPIC, 1 MG/DOSE, 4 MG/3ML SOPN [Pharmacy Med Name: Anthonya Lowing 1MG PER DOSE (1X4MG PEN)] 3 mL 5     Sig: INJECT 1 MG UNDER THE SKIN ONE DAY A WEEK     Last Filled:  06/05/2023    Last appt: 5/19/2023   Next appt: Visit date not found    Last Labs DM:   Lab Results   Component Value Date/Time    LABA1C 7.4 03/13/2023 12:00 AM

## 2023-12-28 ENCOUNTER — HOSPITAL ENCOUNTER (OUTPATIENT)
Dept: CT IMAGING | Age: 73
Discharge: HOME | End: 2023-12-28
Attending: INTERNAL MEDICINE
Payer: MEDICARE

## 2023-12-28 DIAGNOSIS — C18.9 ADENOCARCINOMA OF COLON (HCC): ICD-10-CM

## 2023-12-28 LAB
CREAT SERPL-MCNC: 1.1 MG/DL (ref 0.8–1.3)
GFR SERPLBLD CREATININE-BSD FMLA CKD-EPI: >60 ML/MIN/{1.73_M2}

## 2023-12-28 PROCEDURE — 82565 ASSAY OF CREATININE: CPT

## 2023-12-28 PROCEDURE — 74177 CT ABD & PELVIS W/CONTRAST: CPT

## 2023-12-28 PROCEDURE — 6360000004 HC RX CONTRAST MEDICATION: Performed by: INTERNAL MEDICINE

## 2023-12-28 PROCEDURE — 36415 COLL VENOUS BLD VENIPUNCTURE: CPT

## 2023-12-28 RX ADMIN — IOPAMIDOL 75 ML: 755 INJECTION, SOLUTION INTRAVENOUS at 12:21

## 2023-12-28 RX ADMIN — IOHEXOL 50 ML: 240 INJECTION, SOLUTION INTRATHECAL; INTRAVASCULAR; INTRAVENOUS; ORAL at 12:20

## 2024-01-08 ENCOUNTER — TELEPHONE (OUTPATIENT)
Dept: ADMINISTRATIVE | Age: 74
End: 2024-01-08

## 2024-01-08 NOTE — TELEPHONE ENCOUNTER
SUBMITTED PA FOR Ozempic (1 MG/DOSE) 4MG/3ML pen-injectors VIA CMM Key: [id number] STATUS PENDING.      FOLLOW UP DONE DAILY: IF NO RESPONSE IN 3 DAYS WE WILL REFAX FOR STATUS CHECK. IF ANOTHER 3 DAYS GOES BY WITH NO RESPONSE WILL CALL INSURANCE FOR STATUS.

## 2024-01-11 RX ORDER — SEMAGLUTIDE 1.34 MG/ML
INJECTION, SOLUTION SUBCUTANEOUS
Qty: 3 ML | Refills: 5 | Status: SHIPPED | OUTPATIENT
Start: 2024-01-11

## 2024-01-11 NOTE — TELEPHONE ENCOUNTER
Medication Refill Request  OZEMPIC, 1 MG/DOSE, 4 MG/3ML SOPN     *[facility] does not have until [id number]  *Patient is out-of-medication  - Needs by Monday 1/15/2024    *Please send to:  [id number] - [place], [place] - [address] - P [phone] - F [phone]     *If not [facility] - please advise patient which pharmacy might have in-stock

## 2024-04-01 NOTE — PROGRESS NOTES
[facility]   Cardiac Evaluation      Patient: Benton Ross  YOB: 1950  Date: 4/1/24       No chief complaint on file.           Referring provider: Chirag Kingsley MD    History of Present Illness:   Mr Ross is seen today in follow up. He has a h/o CAD, HTN, ANTONIETTA, and hyperlipidemia.  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      Mr Ross was diagnosed with adenocarcinoma of the colon in 2021. He underwent laparoscopic partial colectomy 9/3/21 with Dr Noble and received 12 rounds of chemotherapy. He recently had a CT that was negative for metastatic disease.      Mr Ross     Past Medical History:   has a past medical history of CAD (coronary artery disease), Colon cancer (HCC), Coronary artery disease involving native coronary artery of native heart without angina pectoris, 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), Diverticulosis of colon, DM type 2 (diabetes mellitus, type 2) (HCC), ED (erectile dysfunction), Epistaxis, Homocysteinemia, HTN (hypertension), benign, Hyperlipidemia LDL goal < 70, Low testosterone, MI (myocardial infarction) (HCC), Neurofibromatosis (HCC), ANTONIETTA (obstructive sleep apnea), PSA elevation, Sensorineural hearing loss, bilateral, and Vitamin D deficiency.    Surgical History:   has a past surgical history that includes Coronary angioplasty with stent (7/12/2004); nasal hemorrhage control (1/1/2012); Leg Surgery (Left, ~2001); Cardiac surgery (2004); Colonoscopy (01/01/2006); Colonoscopy (N/A, 08/31/2018); Colonoscopy (N/A, 8/16/2021); Colonoscopy (8/16/2021); Colonoscopy (8/16/2021); colectomy (N/A, 9/3/2021); and Port Surgery (N/A, 10/5/2021).     Current Outpatient Medications   Medication Sig Dispense Refill    Semaglutide, 1 MG/DOSE, (OZEMPIC, 1 MG/DOSE,) 4 MG/3ML SOPN INJECT 1 MG UNDER THE SKIN ONE DAY A WEEK 3 mL 5    atorvastatin (LIPITOR) 20 MG tablet TAKE 1 TABLET BY MOUTH EVERY DAY 90 tablet 3

## 2024-04-02 ENCOUNTER — OFFICE VISIT (OUTPATIENT)
Dept: CARDIOLOGY CLINIC | Age: 74
End: 2024-04-02
Payer: MEDICARE

## 2024-04-02 VITALS
HEART RATE: 84 BPM | BODY MASS INDEX: 36.84 KG/M2 | OXYGEN SATURATION: 95 % | SYSTOLIC BLOOD PRESSURE: 140 MMHG | HEIGHT: 73 IN | WEIGHT: 278 LBS | DIASTOLIC BLOOD PRESSURE: 80 MMHG

## 2024-04-02 DIAGNOSIS — I10 HTN (HYPERTENSION), BENIGN: ICD-10-CM

## 2024-04-02 DIAGNOSIS — E13.9 DIABETES MELLITUS OF OTHER TYPE WITHOUT COMPLICATION, UNSPECIFIED WHETHER LONG TERM INSULIN USE (HCC): ICD-10-CM

## 2024-04-02 DIAGNOSIS — E78.5 HYPERLIPIDEMIA WITH TARGET LDL LESS THAN 70: ICD-10-CM

## 2024-04-02 DIAGNOSIS — I25.10 CORONARY ARTERY DISEASE INVOLVING NATIVE CORONARY ARTERY OF NATIVE HEART WITHOUT ANGINA PECTORIS: Primary | ICD-10-CM

## 2024-04-02 PROCEDURE — 3079F DIAST BP 80-89 MM HG: CPT | Performed by: INTERNAL MEDICINE

## 2024-04-02 PROCEDURE — 99214 OFFICE O/P EST MOD 30 MIN: CPT | Performed by: INTERNAL MEDICINE

## 2024-04-02 PROCEDURE — G8417 CALC BMI ABV UP PARAM F/U: HCPCS | Performed by: INTERNAL MEDICINE

## 2024-04-02 PROCEDURE — 3046F HEMOGLOBIN A1C LEVEL >9.0%: CPT | Performed by: INTERNAL MEDICINE

## 2024-04-02 PROCEDURE — G8427 DOCREV CUR MEDS BY ELIG CLIN: HCPCS | Performed by: INTERNAL MEDICINE

## 2024-04-02 PROCEDURE — 1036F TOBACCO NON-USER: CPT | Performed by: INTERNAL MEDICINE

## 2024-04-02 PROCEDURE — 2022F DILAT RTA XM EVC RTNOPTHY: CPT | Performed by: INTERNAL MEDICINE

## 2024-04-02 PROCEDURE — 1123F ACP DISCUSS/DSCN MKR DOCD: CPT | Performed by: INTERNAL MEDICINE

## 2024-04-02 PROCEDURE — 3077F SYST BP >= 140 MM HG: CPT | Performed by: INTERNAL MEDICINE

## 2024-04-02 PROCEDURE — 3017F COLORECTAL CA SCREEN DOC REV: CPT | Performed by: INTERNAL MEDICINE

## 2024-04-02 NOTE — PROGRESS NOTES
[facility]   Cardiac Evaluation      Patient: Benton Ross  YOB: 1950  Date: 4/2/24       Chief Complaint   Patient presents with    Coronary Artery Disease    Hypertension    Hyperlipidemia              Referring provider: Chirag Kingsley MD    History of Present Illness:   Mr Ross is seen today in follow up. He has a h/o CAD, HTN, ANTONIETTA, and hyperlipidemia.  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      Mr Ross was diagnosed with adenocarcinoma of the colon in 2021. He underwent laparoscopic partial colectomy 9/3/21 with Dr Noble and received 12 rounds of chemotherapy. He recently had a CT that was negative for metastatic disease.   Now only follows up with oncology every 6 months.     Mr Bernstein states he has anxiety due to his 44 year old step son still \"finding himself\"    Since starting ozempic he is down 12 pounds. He is walking 30 minutes a few times a week, no chest pain shortness of breath palpitations or dizziness.No edema or orthopnea    Past Medical History:   has a past medical history of CAD (coronary artery disease), Colon cancer (HCC), Coronary artery disease involving native coronary artery of native heart without angina pectoris, 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), Diverticulosis of colon, DM type 2 (diabetes mellitus, type 2) (HCC), ED (erectile dysfunction), Epistaxis, Homocysteinemia, HTN (hypertension), benign, Hyperlipidemia LDL goal < 70, Low testosterone, MI (myocardial infarction) (HCC), Neurofibromatosis (HCC), ANTONIETTA (obstructive sleep apnea), PSA elevation, Sensorineural hearing loss, bilateral, and Vitamin D deficiency.    Surgical History:   has a past surgical history that includes Coronary angioplasty with stent (7/12/2004); nasal hemorrhage control (1/1/2012); Leg Surgery (Left, ~2001); Cardiac surgery (2004); Colonoscopy (01/01/2006); Colonoscopy (N/A, 08/31/2018); Colonoscopy (N/A, 8/16/2021);

## 2024-04-02 NOTE — PROGRESS NOTES
[facility]   Cardiac Evaluation      Patient: Benton Ross  YOB: 1950  Date: 4/2/24       Chief Complaint   Patient presents with    Coronary Artery Disease    Hypertension    Hyperlipidemia              Referring provider: Chirag Kingsley MD    History of Present Illness:   Mr Ross is seen today in follow up. He has a h/o CAD, HTN, ANTONIETTA, and hyperlipidemia.  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      Mr Ross was diagnosed with adenocarcinoma of the colon in 2021. He underwent laparoscopic partial colectomy 9/3/21 with Dr Noble and received 12 rounds of chemotherapy. He recently had a CT that was negative for metastatic disease.      Mr Ross     Past Medical History:   has a past medical history of CAD (coronary artery disease), Colon cancer (HCC), Coronary artery disease involving native coronary artery of native heart without angina pectoris, 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), Diverticulosis of colon, DM type 2 (diabetes mellitus, type 2) (HCC), ED (erectile dysfunction), Epistaxis, Homocysteinemia, HTN (hypertension), benign, Hyperlipidemia LDL goal < 70, Low testosterone, MI (myocardial infarction) (HCC), Neurofibromatosis (HCC), ANTONIETTA (obstructive sleep apnea), PSA elevation, Sensorineural hearing loss, bilateral, and Vitamin D deficiency.    Surgical History:   has a past surgical history that includes Coronary angioplasty with stent (7/12/2004); nasal hemorrhage control (1/1/2012); Leg Surgery (Left, ~2001); Cardiac surgery (2004); Colonoscopy (01/01/2006); Colonoscopy (N/A, 08/31/2018); Colonoscopy (N/A, 8/16/2021); Colonoscopy (8/16/2021); Colonoscopy (8/16/2021); colectomy (N/A, 9/3/2021); and Port Surgery (N/A, 10/5/2021).     Current Outpatient Medications   Medication Sig Dispense Refill    Semaglutide, 1 MG/DOSE, (OZEMPIC, 1 MG/DOSE,) 4 MG/3ML SOPN INJECT 1 MG UNDER THE SKIN ONE DAY A WEEK 3 mL 5    atorvastatin

## 2024-04-22 RX ORDER — CARVEDILOL 25 MG/1
TABLET ORAL
Qty: 180 TABLET | Refills: 3 | Status: SHIPPED | OUTPATIENT
Start: 2024-04-22

## 2024-06-04 SDOH — ECONOMIC STABILITY: FOOD INSECURITY: WITHIN THE PAST 12 MONTHS, THE FOOD YOU BOUGHT JUST DIDN'T LAST AND YOU DIDN'T HAVE MONEY TO GET MORE.: NEVER TRUE

## 2024-06-04 SDOH — ECONOMIC STABILITY: INCOME INSECURITY: HOW HARD IS IT FOR YOU TO PAY FOR THE VERY BASICS LIKE FOOD, HOUSING, MEDICAL CARE, AND HEATING?: NOT HARD AT ALL

## 2024-06-04 SDOH — ECONOMIC STABILITY: FOOD INSECURITY: WITHIN THE PAST 12 MONTHS, YOU WORRIED THAT YOUR FOOD WOULD RUN OUT BEFORE YOU GOT MONEY TO BUY MORE.: NEVER TRUE

## 2024-06-04 SDOH — HEALTH STABILITY: PHYSICAL HEALTH: ON AVERAGE, HOW MANY DAYS PER WEEK DO YOU ENGAGE IN MODERATE TO STRENUOUS EXERCISE (LIKE A BRISK WALK)?: 2 DAYS

## 2024-06-04 SDOH — HEALTH STABILITY: PHYSICAL HEALTH: ON AVERAGE, HOW MANY MINUTES DO YOU ENGAGE IN EXERCISE AT THIS LEVEL?: 20 MIN

## 2024-06-04 ASSESSMENT — PATIENT HEALTH QUESTIONNAIRE - PHQ9
3. TROUBLE FALLING OR STAYING ASLEEP: NOT AT ALL
8. MOVING OR SPEAKING SO SLOWLY THAT OTHER PEOPLE COULD HAVE NOTICED. OR THE OPPOSITE, BEING SO FIGETY OR RESTLESS THAT YOU HAVE BEEN MOVING AROUND A LOT MORE THAN USUAL: NOT AT ALL
SUM OF ALL RESPONSES TO PHQ QUESTIONS 1-9: 0
SUM OF ALL RESPONSES TO PHQ QUESTIONS 1-9: 0
10. IF YOU CHECKED OFF ANY PROBLEMS, HOW DIFFICULT HAVE THESE PROBLEMS MADE IT FOR YOU TO DO YOUR WORK, TAKE CARE OF THINGS AT HOME, OR GET ALONG WITH OTHER PEOPLE: NOT DIFFICULT AT ALL
5. POOR APPETITE OR OVEREATING: NOT AT ALL
6. FEELING BAD ABOUT YOURSELF - OR THAT YOU ARE A FAILURE OR HAVE LET YOURSELF OR YOUR FAMILY DOWN: NOT AT ALL
SUM OF ALL RESPONSES TO PHQ9 QUESTIONS 1 & 2: 0
2. FEELING DOWN, DEPRESSED OR HOPELESS: NOT AT ALL
4. FEELING TIRED OR HAVING LITTLE ENERGY: NOT AT ALL
9. THOUGHTS THAT YOU WOULD BE BETTER OFF DEAD, OR OF HURTING YOURSELF: NOT AT ALL
7. TROUBLE CONCENTRATING ON THINGS, SUCH AS READING THE NEWSPAPER OR WATCHING TELEVISION: NOT AT ALL
1. LITTLE INTEREST OR PLEASURE IN DOING THINGS: NOT AT ALL
SUM OF ALL RESPONSES TO PHQ QUESTIONS 1-9: 0
SUM OF ALL RESPONSES TO PHQ QUESTIONS 1-9: 0

## 2024-06-04 ASSESSMENT — LIFESTYLE VARIABLES
HOW OFTEN DO YOU HAVE A DRINK CONTAINING ALCOHOL: 1
HOW OFTEN DO YOU HAVE SIX OR MORE DRINKS ON ONE OCCASION: 1
HOW OFTEN DO YOU HAVE A DRINK CONTAINING ALCOHOL: NEVER
HOW MANY STANDARD DRINKS CONTAINING ALCOHOL DO YOU HAVE ON A TYPICAL DAY: 0
HOW MANY STANDARD DRINKS CONTAINING ALCOHOL DO YOU HAVE ON A TYPICAL DAY: PATIENT DOES NOT DRINK

## 2024-06-05 ENCOUNTER — OFFICE VISIT (OUTPATIENT)
Dept: PRIMARY CARE CLINIC | Age: 74
End: 2024-06-05
Payer: MEDICARE

## 2024-06-05 VITALS
SYSTOLIC BLOOD PRESSURE: 130 MMHG | HEIGHT: 73 IN | OXYGEN SATURATION: 96 % | RESPIRATION RATE: 16 BRPM | TEMPERATURE: 96.9 F | HEART RATE: 90 BPM | WEIGHT: 284 LBS | BODY MASS INDEX: 37.64 KG/M2 | DIASTOLIC BLOOD PRESSURE: 84 MMHG

## 2024-06-05 DIAGNOSIS — E66.01 MORBIDLY OBESE (HCC): ICD-10-CM

## 2024-06-05 DIAGNOSIS — E11.9 TYPE 2 DIABETES MELLITUS WITHOUT COMPLICATION, WITHOUT LONG-TERM CURRENT USE OF INSULIN (HCC): ICD-10-CM

## 2024-06-05 DIAGNOSIS — E11.22 TYPE 2 DIABETES MELLITUS WITH STAGE 3A CHRONIC KIDNEY DISEASE, WITHOUT LONG-TERM CURRENT USE OF INSULIN (HCC): ICD-10-CM

## 2024-06-05 DIAGNOSIS — E72.11 HOMOCYSTINURIA (HCC): ICD-10-CM

## 2024-06-05 DIAGNOSIS — C18.9 ADENOCARCINOMA OF COLON (HCC): ICD-10-CM

## 2024-06-05 DIAGNOSIS — Z12.5 SCREENING FOR PROSTATE CANCER: ICD-10-CM

## 2024-06-05 DIAGNOSIS — Z00.00 MEDICARE ANNUAL WELLNESS VISIT, SUBSEQUENT: Primary | ICD-10-CM

## 2024-06-05 DIAGNOSIS — N18.31 TYPE 2 DIABETES MELLITUS WITH STAGE 3A CHRONIC KIDNEY DISEASE, WITHOUT LONG-TERM CURRENT USE OF INSULIN (HCC): ICD-10-CM

## 2024-06-05 DIAGNOSIS — I10 HTN (HYPERTENSION), BENIGN: Chronic | ICD-10-CM

## 2024-06-05 LAB — HBA1C MFR BLD: 7 %

## 2024-06-05 PROCEDURE — 3051F HG A1C>EQUAL 7.0%<8.0%: CPT | Performed by: INTERNAL MEDICINE

## 2024-06-05 PROCEDURE — 3017F COLORECTAL CA SCREEN DOC REV: CPT | Performed by: INTERNAL MEDICINE

## 2024-06-05 PROCEDURE — G0439 PPPS, SUBSEQ VISIT: HCPCS | Performed by: INTERNAL MEDICINE

## 2024-06-05 PROCEDURE — 1123F ACP DISCUSS/DSCN MKR DOCD: CPT | Performed by: INTERNAL MEDICINE

## 2024-06-05 PROCEDURE — 83036 HEMOGLOBIN GLYCOSYLATED A1C: CPT | Performed by: INTERNAL MEDICINE

## 2024-06-05 PROCEDURE — 3079F DIAST BP 80-89 MM HG: CPT | Performed by: INTERNAL MEDICINE

## 2024-06-05 PROCEDURE — 3075F SYST BP GE 130 - 139MM HG: CPT | Performed by: INTERNAL MEDICINE

## 2024-06-05 ASSESSMENT — ANXIETY QUESTIONNAIRES
5. BEING SO RESTLESS THAT IT IS HARD TO SIT STILL: NOT AT ALL
GAD7 TOTAL SCORE: 0
IF YOU CHECKED OFF ANY PROBLEMS ON THIS QUESTIONNAIRE, HOW DIFFICULT HAVE THESE PROBLEMS MADE IT FOR YOU TO DO YOUR WORK, TAKE CARE OF THINGS AT HOME, OR GET ALONG WITH OTHER PEOPLE: NOT DIFFICULT AT ALL
7. FEELING AFRAID AS IF SOMETHING AWFUL MIGHT HAPPEN: NOT AT ALL
1. FEELING NERVOUS, ANXIOUS, OR ON EDGE: NOT AT ALL
2. NOT BEING ABLE TO STOP OR CONTROL WORRYING: NOT AT ALL
6. BECOMING EASILY ANNOYED OR IRRITABLE: NOT AT ALL
4. TROUBLE RELAXING: NOT AT ALL
3. WORRYING TOO MUCH ABOUT DIFFERENT THINGS: NOT AT ALL

## 2024-06-05 NOTE — PROGRESS NOTES
Medicare Annual Wellness Visit    Benton Ross is here for Medicare AWV    Assessment & Plan   Type 2 diabetes mellitus without complication, without long-term current use of insulin (HCC)    Recommendations for Preventive Services Due: see orders and patient instructions/AVS.  Recommended screening schedule for the next 5-10 years is provided to the patient in written form: see Patient Instructions/AVS.     No follow-ups on file.     Subjective   The following acute and/or chronic problems were also addressed today:  HTN (hypertension), benign  This is a chronic problem. The problem is well controlled.  Patient monitors readings regularly. Pertinent negatives include no chest pain, focal sensory loss, focal weakness, leg pain, myalgias or shortness of breath. No headaches or chest pain. Takes medications regularly.  Blood pressure has been stable, blood work was reviewed, and advised patient to continue the current instructions or medications.        Homocystinuria (HCC)  Has been stable,  Continue current medications.       Coronary artery disease involving native coronary artery of native heart without angina pectoris  No cp or sob,  Patient is compliant w medications, no side effects, effective, provides adequate symptom relief. No new symptoms or problems as noted by patient.  The problem is stable, no changes noted by patient. Will consider monitoring labs and refill medications as appropriate. Patient counseled and will continue current plan.       Adenocarcinoma of colon (HCC)  Seeing oncology,   Stable in remission.      Hyperlipidemia with target LDL less than 70  This has been a long standing problem, takes lipitor      Monitors diet and tries to follow a low fat diet. Has  been reasonably  compliant w exercise. Lipids have been stable, The problem is controlled. Recent lipid tests were reviewed and are normal. Pertinent negatives include no chest pain, focal sensory loss, focal weakness, leg pain,

## 2024-06-05 NOTE — PATIENT INSTRUCTIONS
smoke too.     Stay at a weight that's healthy for you. Talk to your doctor if you need help losing weight.     Try to get 7 to 9 hours of sleep each night.     Limit alcohol to 2 drinks a day for men and 1 drink a day for women. Too much alcohol can cause health problems.     Manage other health problems such as diabetes, high blood pressure, and high cholesterol. If you think you may have a problem with alcohol or drug use, talk to your doctor.   Medicines    Take your medicines exactly as prescribed. Call your doctor if you think you are having a problem with your medicine.     If your doctor recommends aspirin, take the amount directed each day. Make sure you take aspirin and not another kind of pain reliever, such as acetaminophen (Tylenol).   When should you call for help?   Call 911 if you have symptoms of a heart attack. These may include:    Chest pain or pressure, or a strange feeling in the chest.     Sweating.     Shortness of breath.     Pain, pressure, or a strange feeling in the back, neck, jaw, or upper belly or in one or both shoulders or arms.     Lightheadedness or sudden weakness.     A fast or irregular heartbeat.   After you call 911, the  may tell you to chew 1 adult-strength or 2 to 4 low-dose aspirin. Wait for an ambulance. Do not try to drive yourself.  Watch closely for changes in your health, and be sure to contact your doctor if you have any problems.  Where can you learn more?  Go to https://www.blogTV.net/patientEd and enter F075 to learn more about \"A Healthy Heart: Care Instructions.\"  Current as of: June 24, 2023  Content Version: 14.1  © 2006-2024 [employer].   Care instructions adapted under license by [employer]. If you have questions about a medical condition or this instruction, always ask your healthcare professional. [employer] disclaims any warranty or liability for your use of this information.      Personalized Preventive Plan for

## 2024-06-05 NOTE — ASSESSMENT & PLAN NOTE
Diabetes Mellitus Type II:  Home blood sugar records reviewed: fasting range: 120-130 .  No significant episodes of hypoglycemia. Compliant with medications.  No polyuria, polydipsia, visual changes, foot problems, GI upset.  Diabetic diet compliance:  compliant most of the time.  Current exercise: none. Will check labs, and refill medications as appropriate.    Hypertension:  Denies CP, SOB, visual changes, dizziness, palpitations or HA.  He is adherent to a low sodium diet.  Blood pressure typically runs ok  outside of the office. Continue current medications.    Hyperlipidemia:  No new myalgias or GI upset on current medications.       Lab Results   Component Value Date    LABA1C 7.4 03/13/2023    LABA1C 7.1 04/21/2022    LABA1C 6.7 08/14/2021     Lab Results   Component Value Date    CREATININE 1.1 12/28/2023     Lab Results   Component Value Date    ALT 13 08/17/2021    AST 15 08/17/2021     No components found for: \"CHLPL\"  Lab Results   Component Value Date    TRIG 111 03/13/2023     Lab Results   Component Value Date    HDL 51 03/13/2023     No results found for: \"LDLDIRECT\"    This problem was reviewed in detail. It is under control and stable. Will check blood work.

## 2024-06-12 ENCOUNTER — HOSPITAL ENCOUNTER (OUTPATIENT)
Age: 74
Discharge: HOME | End: 2024-06-12
Payer: MEDICARE

## 2024-06-12 DIAGNOSIS — Z12.5 SCREENING FOR PROSTATE CANCER: ICD-10-CM

## 2024-06-12 DIAGNOSIS — E11.9 TYPE 2 DIABETES MELLITUS WITHOUT COMPLICATION, WITHOUT LONG-TERM CURRENT USE OF INSULIN (HCC): ICD-10-CM

## 2024-06-12 LAB
ALBUMIN SERPL-MCNC: 4.2 G/DL (ref 3.4–5)
ALBUMIN/GLOB SERPL: 1.5 {RATIO} (ref 1.1–2.2)
ALP SERPL-CCNC: 62 U/L (ref 40–129)
ALT SERPL-CCNC: 19 U/L (ref 10–40)
ANION GAP SERPL CALCULATED.3IONS-SCNC: 12 MMOL/L (ref 3–16)
AST SERPL-CCNC: 19 U/L (ref 15–37)
BASOPHILS # BLD: 0 K/UL (ref 0–0.2)
BASOPHILS NFR BLD: 0.6 %
BILIRUB SERPL-MCNC: 0.8 MG/DL (ref 0–1)
BUN SERPL-MCNC: 10 MG/DL (ref 7–20)
CALCIUM SERPL-MCNC: 9.5 MG/DL (ref 8.3–10.6)
CHLORIDE SERPL-SCNC: 104 MMOL/L (ref 99–110)
CHOLEST SERPL-MCNC: 148 MG/DL (ref 0–199)
CO2 SERPL-SCNC: 24 MMOL/L (ref 21–32)
CREAT SERPL-MCNC: 1.1 MG/DL (ref 0.8–1.3)
CREAT UR-MCNC: 103.4 MG/DL (ref 39–259)
DEPRECATED RDW RBC AUTO: 15.2 % (ref 12.4–15.4)
EOSINOPHIL # BLD: 0.1 K/UL (ref 0–0.6)
EOSINOPHIL NFR BLD: 3 %
GFR SERPLBLD CREATININE-BSD FMLA CKD-EPI: 70 ML/MIN/{1.73_M2}
GLUCOSE SERPL-MCNC: 109 MG/DL (ref 70–99)
HCT VFR BLD AUTO: 41.9 % (ref 40.5–52.5)
HDLC SERPL-MCNC: 54 MG/DL (ref 40–60)
HGB BLD-MCNC: 14.3 G/DL (ref 13.5–17.5)
LDLC SERPL CALC-MCNC: 77 MG/DL
LYMPHOCYTES # BLD: 1.2 K/UL (ref 1–5.1)
LYMPHOCYTES NFR BLD: 25.6 %
MCH RBC QN AUTO: 27.3 PG (ref 26–34)
MCHC RBC AUTO-ENTMCNC: 34 G/DL (ref 31–36)
MCV RBC AUTO: 80.3 FL (ref 80–100)
MICROALBUMIN UR DL<=1MG/L-MCNC: 8.3 MG/DL
MICROALBUMIN/CREAT UR: 80.3 MG/G (ref 0–30)
MONOCYTES # BLD: 0.5 K/UL (ref 0–1.3)
NEUTROPHILS # BLD: 2.9 K/UL (ref 1.7–7.7)
NEUTROPHILS NFR BLD: 60 %
PLATELET # BLD AUTO: 184 K/UL (ref 135–450)
PMV BLD AUTO: 9.3 FL (ref 5–10.5)
POTASSIUM SERPL-SCNC: 3.6 MMOL/L (ref 3.5–5.1)
PROT SERPL-MCNC: 7 G/DL (ref 6.4–8.2)
PSA SERPL DL<=0.01 NG/ML-MCNC: 0.91 NG/ML (ref 0–4)
RBC # BLD AUTO: 5.22 M/UL (ref 4.2–5.9)
SODIUM SERPL-SCNC: 140 MMOL/L (ref 136–145)
TRIGL SERPL-MCNC: 86 MG/DL (ref 0–150)
TSH SERPL DL<=0.005 MIU/L-ACNC: 2.51 UIU/ML (ref 0.27–4.2)
VLDLC SERPL CALC-MCNC: 17 MG/DL
WBC # BLD AUTO: 4.8 K/UL (ref 4–11)

## 2024-06-12 PROCEDURE — 84443 ASSAY THYROID STIM HORMONE: CPT

## 2024-06-12 PROCEDURE — 84153 ASSAY OF PSA TOTAL: CPT

## 2024-06-12 PROCEDURE — 80061 LIPID PANEL: CPT

## 2024-06-12 PROCEDURE — 82570 ASSAY OF URINE CREATININE: CPT

## 2024-06-12 PROCEDURE — 36415 COLL VENOUS BLD VENIPUNCTURE: CPT

## 2024-06-12 PROCEDURE — 82043 UR ALBUMIN QUANTITATIVE: CPT

## 2024-06-12 PROCEDURE — 85025 COMPLETE CBC W/AUTO DIFF WBC: CPT

## 2024-06-12 PROCEDURE — 80053 COMPREHEN METABOLIC PANEL: CPT

## 2024-07-10 RX ORDER — SEMAGLUTIDE 1.34 MG/ML
INJECTION, SOLUTION SUBCUTANEOUS
Qty: 3 ADJUSTABLE DOSE PRE-FILLED PEN SYRINGE | Refills: 5 | Status: SHIPPED | OUTPATIENT
Start: 2024-07-10

## 2024-07-10 NOTE — TELEPHONE ENCOUNTER
Medication:   Requested Prescriptions     Pending Prescriptions Disp Refills    OZEMPIC, 1 MG/DOSE, 4 MG/3ML SOPN sc injection [Pharmacy Med Name: OZEMPIC 4 MG/3 ML (1 MG/DOSE)]  5     Sig: INJECT 1 MG UNDER THE SKIN ONE DAY A WEEK     Last Filled:  01/11/2024    Last appt: 6/5/2024   Next appt: Visit date not found    Last Labs DM:   Lab Results   Component Value Date/Time    LABA1C 7.0 06/05/2024 09:16 AM

## 2024-08-07 RX ORDER — AMLODIPINE BESYLATE 10 MG/1
TABLET ORAL
Qty: 90 TABLET | Refills: 3 | Status: SHIPPED | OUTPATIENT
Start: 2024-08-07

## 2024-08-07 RX ORDER — ATORVASTATIN CALCIUM 40 MG/1
40 TABLET, FILM COATED ORAL DAILY
Qty: 90 TABLET | Refills: 3 | Status: SHIPPED | OUTPATIENT
Start: 2024-08-07

## 2024-10-07 ENCOUNTER — TELEPHONE (OUTPATIENT)
Dept: CARDIOLOGY CLINIC | Age: 74
End: 2024-10-07

## 2024-10-07 NOTE — TELEPHONE ENCOUNTER
Medication Refill    Medication needing refilled:  carvedilol (COREG) 25 MG tablet   Dosage of the medication:    How are you taking this medication (QD, BID, TID, QID, PRN):  TAKE 1 TABLET BY MOUTH TWICE A DAY   30 or 90 day supply called in:90    When will you run out of your medication:  Next appt 10/24  Which Pharmacy are we sending the medication to?:  [facility] #[id number] [facility] [address] - [place] [phone] - F [phone]

## 2024-10-08 RX ORDER — CARVEDILOL 25 MG/1
TABLET ORAL
Qty: 180 TABLET | Refills: 1 | Status: SHIPPED | OUTPATIENT
Start: 2024-10-08

## 2024-10-11 NOTE — PROGRESS NOTES
home. Diovan/HCT, Clonidine, Amlodipine. Increase Clonidine to 0.2mg BID   3. Hyperlipidemia - stable on labs 6/12/24: ; TRIG 86; HDL 54; LDL 77, continue lipitor 40mg daily      A1c 6/5/24: 7.0 - on ozempic    PLAN:  Increase Clonidine to 0.2mg BID.  Monitor BP at home and call if readings are >140/systolic.  Encouraged to continue to walk the dog. FU 6 months.     Scribe's attestation: This note was scribed in the presence of Dr Artemio PATEL by Nereida Turner RN.The scribe's documentation has been prepared under my direction and personally reviewed by me in its entirety. I confirm that the note above accurately reflects all work, treatment, procedures, and medical decision making performed by me.         Thank you for allowing to me to participate in the care of Benton Ross.

## 2024-10-14 RX ORDER — ATORVASTATIN CALCIUM 20 MG/1
TABLET, FILM COATED ORAL
Qty: 90 TABLET | Refills: 3 | OUTPATIENT
Start: 2024-10-14

## 2024-10-14 NOTE — TELEPHONE ENCOUNTER
The original prescription was discontinued on 4/2/2024 by Vane Wong MA for the following reason: LIST CLEANUP.

## 2024-10-18 ENCOUNTER — HOSPITAL ENCOUNTER (OUTPATIENT)
Dept: CT IMAGING | Age: 74
Discharge: HOME | End: 2024-10-18
Attending: INTERNAL MEDICINE
Payer: MEDICARE

## 2024-10-18 DIAGNOSIS — C18.4 MALIGNANT NEOPLASM OF TRANSVERSE COLON (HCC): ICD-10-CM

## 2024-10-18 LAB
PERFORMED ON: NORMAL
POC CREATININE: 1.1 MG/DL (ref 0.8–1.3)
POC SAMPLE TYPE: NORMAL

## 2024-10-18 PROCEDURE — 82565 ASSAY OF CREATININE: CPT

## 2024-10-18 PROCEDURE — 6360000004 HC RX CONTRAST MEDICATION: Performed by: INTERNAL MEDICINE

## 2024-10-18 PROCEDURE — 74177 CT ABD & PELVIS W/CONTRAST: CPT

## 2024-10-18 RX ORDER — IOPAMIDOL 755 MG/ML
75 INJECTION, SOLUTION INTRAVASCULAR
Status: COMPLETED | OUTPATIENT
Start: 2024-10-18 | End: 2024-10-18

## 2024-10-18 RX ORDER — IOPAMIDOL 612 MG/ML
50 INJECTION, SOLUTION INTRAVASCULAR
Status: COMPLETED | OUTPATIENT
Start: 2024-10-18 | End: 2024-10-18

## 2024-10-18 RX ADMIN — IOPAMIDOL 50 ML: 612 INJECTION, SOLUTION INTRAVENOUS at 15:33

## 2024-10-18 RX ADMIN — IOPAMIDOL 75 ML: 755 INJECTION, SOLUTION INTRAVENOUS at 15:33

## 2024-10-23 ENCOUNTER — NURSE ONLY (OUTPATIENT)
Dept: PRIMARY CARE CLINIC | Age: 74
End: 2024-10-23
Payer: MEDICARE

## 2024-10-23 DIAGNOSIS — Z23 NEEDS FLU SHOT: Primary | ICD-10-CM

## 2024-10-23 PROCEDURE — 90653 IIV ADJUVANT VACCINE IM: CPT | Performed by: INTERNAL MEDICINE

## 2024-10-23 PROCEDURE — G0008 ADMIN INFLUENZA VIRUS VAC: HCPCS | Performed by: INTERNAL MEDICINE

## 2024-10-24 ENCOUNTER — OFFICE VISIT (OUTPATIENT)
Dept: CARDIOLOGY CLINIC | Age: 74
End: 2024-10-24
Payer: MEDICARE

## 2024-10-24 VITALS
DIASTOLIC BLOOD PRESSURE: 72 MMHG | OXYGEN SATURATION: 98 % | BODY MASS INDEX: 36.31 KG/M2 | HEART RATE: 90 BPM | WEIGHT: 274 LBS | SYSTOLIC BLOOD PRESSURE: 144 MMHG | HEIGHT: 73 IN

## 2024-10-24 DIAGNOSIS — E78.5 HYPERLIPIDEMIA WITH TARGET LDL LESS THAN 70: ICD-10-CM

## 2024-10-24 DIAGNOSIS — I25.10 CORONARY ARTERY DISEASE INVOLVING NATIVE CORONARY ARTERY OF NATIVE HEART WITHOUT ANGINA PECTORIS: Primary | Chronic | ICD-10-CM

## 2024-10-24 DIAGNOSIS — I10 HTN (HYPERTENSION), BENIGN: ICD-10-CM

## 2024-10-24 PROCEDURE — 1159F MED LIST DOCD IN RCRD: CPT | Performed by: INTERNAL MEDICINE

## 2024-10-24 PROCEDURE — 3077F SYST BP >= 140 MM HG: CPT | Performed by: INTERNAL MEDICINE

## 2024-10-24 PROCEDURE — 93000 ELECTROCARDIOGRAM COMPLETE: CPT | Performed by: INTERNAL MEDICINE

## 2024-10-24 PROCEDURE — 99214 OFFICE O/P EST MOD 30 MIN: CPT | Performed by: INTERNAL MEDICINE

## 2024-10-24 PROCEDURE — 1036F TOBACCO NON-USER: CPT | Performed by: INTERNAL MEDICINE

## 2024-10-24 PROCEDURE — G8482 FLU IMMUNIZE ORDER/ADMIN: HCPCS | Performed by: INTERNAL MEDICINE

## 2024-10-24 PROCEDURE — 3078F DIAST BP <80 MM HG: CPT | Performed by: INTERNAL MEDICINE

## 2024-10-24 PROCEDURE — 3017F COLORECTAL CA SCREEN DOC REV: CPT | Performed by: INTERNAL MEDICINE

## 2024-10-24 PROCEDURE — 1123F ACP DISCUSS/DSCN MKR DOCD: CPT | Performed by: INTERNAL MEDICINE

## 2024-10-24 PROCEDURE — G8417 CALC BMI ABV UP PARAM F/U: HCPCS | Performed by: INTERNAL MEDICINE

## 2024-10-24 PROCEDURE — G8427 DOCREV CUR MEDS BY ELIG CLIN: HCPCS | Performed by: INTERNAL MEDICINE

## 2024-10-24 RX ORDER — CLONIDINE HYDROCHLORIDE 0.2 MG/1
0.2 TABLET ORAL 2 TIMES DAILY
Qty: 180 TABLET | Refills: 3
Start: 2024-10-24

## 2024-12-30 RX ORDER — CLONIDINE HYDROCHLORIDE 0.2 MG/1
0.2 TABLET ORAL 2 TIMES DAILY
Qty: 180 TABLET | Refills: 3 | Status: SHIPPED | OUTPATIENT
Start: 2024-12-30

## 2024-12-30 NOTE — TELEPHONE ENCOUNTER
Medication Refill    Medication needing refilled:  cloNIDine (CATAPRES)    Dosage of the medication:  0.2 MG tablet     How are you taking this medication (QD, BID, TID, QID, PRN):  Take 1 tablet by mouth 2 times daily     30 or 90 day supply called in:  90 days    When will you run out of your medication:    Which Pharmacy are we sending the medication to?:  [facility] #[id number] [facility] [address] - [place] [phone] - F [phone]

## 2025-01-24 ENCOUNTER — TELEPHONE (OUTPATIENT)
Dept: CARDIOLOGY CLINIC | Age: 75
End: 2025-01-24

## 2025-01-24 RX ORDER — VALSARTAN AND HYDROCHLOROTHIAZIDE 320; 12.5 MG/1; MG/1
1 TABLET, FILM COATED ORAL DAILY
Qty: 90 TABLET | Refills: 3 | Status: SHIPPED | OUTPATIENT
Start: 2025-01-24

## 2025-01-24 NOTE — TELEPHONE ENCOUNTER
Chart reviewed, rx sent. Pt notified.    [facility] Care Coordination Contact    6 month telephone assessment due. Member with expressive aphagia and requests that we go through children for calls. Called son Ed and no answer. ML requesting a call back for the 6 month check in.  Autumn Jacobsen RN, PHN, [facility] Care Coordination  [phone]

## 2025-01-24 NOTE — TELEPHONE ENCOUNTER
Medication Refill    Medication needing refilled:  valsartan-hydroCHLOROthiazide (DIOVAN-HCT) 320-12.5 MG - PT IS CURRENTLY OUT    Dosage of the medication:     How are you taking this medication (QD, BID, TID, QID, PRN): 1 tablet by mouth daily     30 or 90 day supply called in: 90 day supply    When will you run out of your medication:    Which Pharmacy are we sending the medication to?:    [facility] #[id number] [facility] [address] - [place] [phone] - F [phone]

## 2025-03-11 ENCOUNTER — TELEPHONE (OUTPATIENT)
Dept: PRIMARY CARE CLINIC | Age: 75
End: 2025-03-11

## 2025-03-11 ENCOUNTER — OFFICE VISIT (OUTPATIENT)
Dept: PRIMARY CARE CLINIC | Age: 75
End: 2025-03-11
Payer: MEDICARE

## 2025-03-11 ENCOUNTER — TELEPHONE (OUTPATIENT)
Dept: ADMINISTRATIVE | Age: 75
End: 2025-03-11

## 2025-03-11 VITALS
OXYGEN SATURATION: 99 % | RESPIRATION RATE: 13 BRPM | DIASTOLIC BLOOD PRESSURE: 90 MMHG | HEART RATE: 90 BPM | BODY MASS INDEX: 35.23 KG/M2 | WEIGHT: 267 LBS | TEMPERATURE: 97.6 F | SYSTOLIC BLOOD PRESSURE: 144 MMHG

## 2025-03-11 DIAGNOSIS — C18.9 ADENOCARCINOMA OF COLON (HCC): ICD-10-CM

## 2025-03-11 DIAGNOSIS — N18.31 TYPE 2 DIABETES MELLITUS WITH STAGE 3A CHRONIC KIDNEY DISEASE, WITHOUT LONG-TERM CURRENT USE OF INSULIN (HCC): ICD-10-CM

## 2025-03-11 DIAGNOSIS — E11.22 TYPE 2 DIABETES MELLITUS WITH STAGE 3A CHRONIC KIDNEY DISEASE, WITHOUT LONG-TERM CURRENT USE OF INSULIN (HCC): ICD-10-CM

## 2025-03-11 DIAGNOSIS — E66.01 MORBIDLY OBESE: ICD-10-CM

## 2025-03-11 DIAGNOSIS — E78.5 HYPERLIPIDEMIA WITH TARGET LDL LESS THAN 70: ICD-10-CM

## 2025-03-11 DIAGNOSIS — I25.10 CORONARY ARTERY DISEASE INVOLVING NATIVE CORONARY ARTERY OF NATIVE HEART WITHOUT ANGINA PECTORIS: ICD-10-CM

## 2025-03-11 DIAGNOSIS — I10 HTN (HYPERTENSION), BENIGN: ICD-10-CM

## 2025-03-11 DIAGNOSIS — E11.9 TYPE 2 DIABETES MELLITUS WITHOUT COMPLICATION, WITHOUT LONG-TERM CURRENT USE OF INSULIN (HCC): Primary | Chronic | ICD-10-CM

## 2025-03-11 LAB — HBA1C MFR BLD: 6.9 %

## 2025-03-11 PROCEDURE — 1159F MED LIST DOCD IN RCRD: CPT | Performed by: INTERNAL MEDICINE

## 2025-03-11 PROCEDURE — 1123F ACP DISCUSS/DSCN MKR DOCD: CPT | Performed by: INTERNAL MEDICINE

## 2025-03-11 PROCEDURE — G8417 CALC BMI ABV UP PARAM F/U: HCPCS | Performed by: INTERNAL MEDICINE

## 2025-03-11 PROCEDURE — 3077F SYST BP >= 140 MM HG: CPT | Performed by: INTERNAL MEDICINE

## 2025-03-11 PROCEDURE — 1036F TOBACCO NON-USER: CPT | Performed by: INTERNAL MEDICINE

## 2025-03-11 PROCEDURE — 2022F DILAT RTA XM EVC RTNOPTHY: CPT | Performed by: INTERNAL MEDICINE

## 2025-03-11 PROCEDURE — 3046F HEMOGLOBIN A1C LEVEL >9.0%: CPT | Performed by: INTERNAL MEDICINE

## 2025-03-11 PROCEDURE — G8427 DOCREV CUR MEDS BY ELIG CLIN: HCPCS | Performed by: INTERNAL MEDICINE

## 2025-03-11 PROCEDURE — 3080F DIAST BP >= 90 MM HG: CPT | Performed by: INTERNAL MEDICINE

## 2025-03-11 PROCEDURE — 83036 HEMOGLOBIN GLYCOSYLATED A1C: CPT | Performed by: INTERNAL MEDICINE

## 2025-03-11 PROCEDURE — 3017F COLORECTAL CA SCREEN DOC REV: CPT | Performed by: INTERNAL MEDICINE

## 2025-03-11 PROCEDURE — 99214 OFFICE O/P EST MOD 30 MIN: CPT | Performed by: INTERNAL MEDICINE

## 2025-03-11 RX ORDER — SEMAGLUTIDE 1.34 MG/ML
INJECTION, SOLUTION SUBCUTANEOUS
Qty: 3 ADJUSTABLE DOSE PRE-FILLED PEN SYRINGE | Refills: 5 | Status: SHIPPED | OUTPATIENT
Start: 2025-03-11

## 2025-03-11 SDOH — ECONOMIC STABILITY: FOOD INSECURITY: WITHIN THE PAST 12 MONTHS, THE FOOD YOU BOUGHT JUST DIDN'T LAST AND YOU DIDN'T HAVE MONEY TO GET MORE.: NEVER TRUE

## 2025-03-11 SDOH — ECONOMIC STABILITY: INCOME INSECURITY: IN THE LAST 12 MONTHS, WAS THERE A TIME WHEN YOU WERE NOT ABLE TO PAY THE MORTGAGE OR RENT ON TIME?: NO

## 2025-03-11 SDOH — ECONOMIC STABILITY: FOOD INSECURITY: WITHIN THE PAST 12 MONTHS, YOU WORRIED THAT YOUR FOOD WOULD RUN OUT BEFORE YOU GOT MONEY TO BUY MORE.: NEVER TRUE

## 2025-03-11 ASSESSMENT — PATIENT HEALTH QUESTIONNAIRE - PHQ9
SUM OF ALL RESPONSES TO PHQ QUESTIONS 1-9: 0
SUM OF ALL RESPONSES TO PHQ9 QUESTIONS 1 & 2: 0
SUM OF ALL RESPONSES TO PHQ QUESTIONS 1-9: 0
1. LITTLE INTEREST OR PLEASURE IN DOING THINGS: NOT AT ALL
1. LITTLE INTEREST OR PLEASURE IN DOING THINGS: NOT AT ALL
2. FEELING DOWN, DEPRESSED OR HOPELESS: NOT AT ALL
SUM OF ALL RESPONSES TO PHQ QUESTIONS 1-9: 0
SUM OF ALL RESPONSES TO PHQ QUESTIONS 1-9: 0
2. FEELING DOWN, DEPRESSED OR HOPELESS: NOT AT ALL

## 2025-03-11 NOTE — PROGRESS NOTES
Allergies       ROS: No unusual headaches or allergy symptoms or blurred vision.  No prolonged cough. No chest pain,dizziness, dyspnea, palpitations, or chest pain on exertion. No nausea or vommitting or diarrhea.  No abdominal pain, change in bowel habits, black or bloody stools.  No urinary tract  symptoms.  No new or unusual musculoskeletal symptoms.  No numbness, weakness, or tingling. No falls, or loss of consciousness. No weight loss or back pain. No paresthesias. No joint swelling or redness. No recent weight loss. No focal weakness or sensory deficits. No hematemesis. No hearing loss. No siezures.     OBJECTIVE-  BP (!) 144/90 (BP Site: Left Upper Arm, Patient Position: Sitting, BP Cuff Size: Large Adult)   Pulse 90   Temp 97.6 °F (36.4 °C)   Resp 13   Wt 121.1 kg (267 lb)   SpO2 99%   BMI 35.23 kg/m²    The physical exam reveals a patient who appears well, alert and oriented x 3, pleasant, cooperative. Vitals are as noted. Head is atraumatic and normocephalic. Eyes reveal normal conjunctiva, cornea normal, pupils are equal and rective to light. Nasal mucosa is normal. Throat is normal without exudates. Ears reveal normal tympanic membranes.Neck is supple and free of adenopathy, or masses. No thyromegaly. No jugular venous distension. Lungs are clear to auscultation, no rales or rhonchi noted. Heart sounds are regular , no murmurs, clicks, gallops or rubs. Abdomen is soft, no tenderness, masses or organomegaly. Bowel sounds are normally heard.Pelvis: normal. Extremities are normal. Peripheral pulses are normal. Screening neurological exam is normal without focal findings. Cranial nerves are intact, reflexes are symmetrical and muscle strength eaqual. Skin is normal without suspicious lesions noted.         ASSESSMENT / PLAN-  DM type 2 (diabetes mellitus, type 2) (HCC)   Diabetes Mellitus Type II:  Home blood sugar records reviewed: fasting range: 120-130.  No significant episodes of hypoglycemia.

## 2025-03-11 NOTE — TELEPHONE ENCOUNTER
Submitted PA for Ozempic (1 MG/DOSE) 4MG/3ML pen-injectors  Via [facility] [id number] STATUS: PENDING.    Follow up done daily; if no decision with in three days we will refax.  If another three days goes by with no decision will call the insurance for status.

## 2025-03-11 NOTE — TELEPHONE ENCOUNTER
Needs PA for emaglutide, 1 MG/DOSE, (OZEMPIC, 1 MG/DOSE,) 4 MG/3ML SOPN sc injection [[id number]]    Order Details  Dose, Route, Frequency: As Directed   Dispense Quantity: 3 Adjustable Dose Pre-filled Pen Syringe Refills: 5          Sig: INJECT 1 MG UNDER THE SKIN ONE DAY A WEEK         Start Date: 03/11/25 End Date: --   Written Date: 03/11/25 Expiration Date: 03/11/26       Associated Diagnoses: Type 2 diabetes mellitus without complication, without long-term current use of insulin (HCC) [E11.9]; Morbidly obese [E66.01]   Original Order: Semaglutide, 1 MG/DOSE, (OZEMPIC, 1 MG/DOSE,) 4 MG/3ML SOPN sc injection [[id number]

## 2025-03-13 NOTE — TELEPHONE ENCOUNTER
Submitted PA for Ozempic (1 MG/DOSE) 4MG/3ML pen-injectors 03/11/2025  Via [facility] [id number] STATUS: PENDING.    Follow up done daily; if no decision with in three days we will refax.  If another three days goes by with no decision will call the insurance for status.

## 2025-03-13 NOTE — TELEPHONE ENCOUNTER
The medication is APPROVED.    Message from Plan  Request Reference Number: [id number]. OZEMPIC INJ 4MG/3ML is approved through 12/31/2025. Your patient may now fill this prescription and it will be covered.. Authorization Expiration Date: December 31, 2025.    If this requires a response please respond to the pool ( P [facility] MEDICATION PRE-AUTH).      Thank you please advise patient.

## 2025-03-26 RX ORDER — CARVEDILOL 25 MG/1
25 TABLET ORAL 2 TIMES DAILY
Qty: 180 TABLET | Refills: 1 | Status: SHIPPED | OUTPATIENT
Start: 2025-03-26

## 2025-04-17 ENCOUNTER — HOSPITAL ENCOUNTER (OUTPATIENT)
Dept: CT IMAGING | Age: 75
Discharge: HOME | End: 2025-04-17
Attending: INTERNAL MEDICINE
Payer: MEDICARE

## 2025-04-17 DIAGNOSIS — C18.9 ADENOCARCINOMA OF COLON: ICD-10-CM

## 2025-04-17 LAB
PERFORMED ON: ABNORMAL
POC CREATININE: 1.3 MG/DL (ref 0.8–1.3)
POC SAMPLE TYPE: ABNORMAL

## 2025-04-17 PROCEDURE — 82565 ASSAY OF CREATININE: CPT

## 2025-04-17 PROCEDURE — 74177 CT ABD & PELVIS W/CONTRAST: CPT

## 2025-04-17 PROCEDURE — 6360000004 HC RX CONTRAST MEDICATION: Performed by: INTERNAL MEDICINE

## 2025-04-17 RX ORDER — IOPAMIDOL 755 MG/ML
75 INJECTION, SOLUTION INTRAVASCULAR
Status: COMPLETED | OUTPATIENT
Start: 2025-04-17 | End: 2025-04-17

## 2025-04-17 RX ORDER — IOPAMIDOL 612 MG/ML
50 INJECTION, SOLUTION INTRAVASCULAR
Status: COMPLETED | OUTPATIENT
Start: 2025-04-17 | End: 2025-04-17

## 2025-04-17 RX ADMIN — IOPAMIDOL 75 ML: 755 INJECTION, SOLUTION INTRAVENOUS at 17:10

## 2025-04-17 RX ADMIN — IOPAMIDOL 50 ML: 612 INJECTION, SOLUTION INTRAVENOUS at 17:10

## 2025-05-23 ENCOUNTER — OFFICE VISIT (OUTPATIENT)
Dept: CARDIOLOGY CLINIC | Age: 75
End: 2025-05-23
Payer: MEDICARE

## 2025-05-23 VITALS
HEART RATE: 93 BPM | SYSTOLIC BLOOD PRESSURE: 112 MMHG | DIASTOLIC BLOOD PRESSURE: 72 MMHG | HEIGHT: 73 IN | BODY MASS INDEX: 35.39 KG/M2 | OXYGEN SATURATION: 96 % | WEIGHT: 267 LBS

## 2025-05-23 DIAGNOSIS — I25.10 CORONARY ARTERY DISEASE INVOLVING NATIVE CORONARY ARTERY OF NATIVE HEART WITHOUT ANGINA PECTORIS: Primary | ICD-10-CM

## 2025-05-23 DIAGNOSIS — I10 HTN (HYPERTENSION), BENIGN: ICD-10-CM

## 2025-05-23 DIAGNOSIS — E78.5 HYPERLIPIDEMIA WITH TARGET LDL LESS THAN 70: ICD-10-CM

## 2025-05-23 PROCEDURE — G8417 CALC BMI ABV UP PARAM F/U: HCPCS | Performed by: INTERNAL MEDICINE

## 2025-05-23 PROCEDURE — 1159F MED LIST DOCD IN RCRD: CPT | Performed by: INTERNAL MEDICINE

## 2025-05-23 PROCEDURE — G2211 COMPLEX E/M VISIT ADD ON: HCPCS | Performed by: INTERNAL MEDICINE

## 2025-05-23 PROCEDURE — 3074F SYST BP LT 130 MM HG: CPT | Performed by: INTERNAL MEDICINE

## 2025-05-23 PROCEDURE — 3078F DIAST BP <80 MM HG: CPT | Performed by: INTERNAL MEDICINE

## 2025-05-23 PROCEDURE — 1036F TOBACCO NON-USER: CPT | Performed by: INTERNAL MEDICINE

## 2025-05-23 PROCEDURE — G8427 DOCREV CUR MEDS BY ELIG CLIN: HCPCS | Performed by: INTERNAL MEDICINE

## 2025-05-23 PROCEDURE — 1123F ACP DISCUSS/DSCN MKR DOCD: CPT | Performed by: INTERNAL MEDICINE

## 2025-05-23 PROCEDURE — 3017F COLORECTAL CA SCREEN DOC REV: CPT | Performed by: INTERNAL MEDICINE

## 2025-05-23 PROCEDURE — 99214 OFFICE O/P EST MOD 30 MIN: CPT | Performed by: INTERNAL MEDICINE

## 2025-05-23 RX ORDER — VALSARTAN AND HYDROCHLOROTHIAZIDE 320; 12.5 MG/1; MG/1
1 TABLET, FILM COATED ORAL DAILY
Qty: 90 TABLET | Refills: 3 | Status: SHIPPED | OUTPATIENT
Start: 2025-05-23

## 2025-05-23 RX ORDER — AMLODIPINE BESYLATE 10 MG/1
TABLET ORAL
Qty: 90 TABLET | Refills: 3 | Status: SHIPPED | OUTPATIENT
Start: 2025-05-23

## 2025-05-23 RX ORDER — CLONIDINE HYDROCHLORIDE 0.2 MG/1
0.2 TABLET ORAL 2 TIMES DAILY
Qty: 180 TABLET | Refills: 3 | Status: SHIPPED | OUTPATIENT
Start: 2025-05-23

## 2025-05-23 RX ORDER — ATORVASTATIN CALCIUM 40 MG/1
40 TABLET, FILM COATED ORAL DAILY
Qty: 90 TABLET | Refills: 3 | Status: SHIPPED | OUTPATIENT
Start: 2025-05-23

## 2025-05-23 RX ORDER — CARVEDILOL 25 MG/1
25 TABLET ORAL 2 TIMES DAILY
Qty: 180 TABLET | Refills: 3 | Status: SHIPPED | OUTPATIENT
Start: 2025-05-23

## 2025-05-23 NOTE — PROGRESS NOTES
[facility]   Cardiac Evaluation      Patient: Benton Ross  YOB: 1950  Date: 25       Chief Complaint   Patient presents with    Coronary Artery Disease    Hypertension    Hyperlipidemia              Referring provider: Chirag Kingsley MD    History of Present Illness:   Mr Ross is seen today in follow up. He has a h/o CAD, HTN, ANTONIETTA, and hyperlipidemia.  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      Mr Ross was diagnosed with adenocarcinoma of the colon in . He underwent laparoscopic partial colectomy 9/3/21 with Dr Noble and received 12 rounds of chemotherapy. He recently had a CT that was negative for metastatic disease.      Mr Ross's 44 yr old step son  of a PE in .      Today, Mr Ross states he has been feeling well other than allergies. Benton is tolerating his medications and checks his bp periodically. He denies chest pain, palpitations, dizziness, or edema. He walks his dog for exercise, daily.     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    Surgical History:   has a past surgical history that includes Coronary angioplasty with stent (2004); nasal hemorrhage control (2012); Leg Surgery (Left, ~); Cardiac surgery (); Colonoscopy (2006); Colonoscopy (N/A, 2018); Colonoscopy (N/A, 2021); Colonoscopy

## 2025-07-29 DIAGNOSIS — E78.5 HYPERLIPIDEMIA WITH TARGET LDL LESS THAN 70: Chronic | ICD-10-CM

## 2025-07-29 DIAGNOSIS — I10 HTN (HYPERTENSION), BENIGN: Primary | Chronic | ICD-10-CM

## 2025-07-29 NOTE — TELEPHONE ENCOUNTER
5/23/2025 Ov with [facility]    10/29/2025 next OV    6/12/2024 Labs     LM That he is due for labs and to call us back.

## 2025-07-30 RX ORDER — AMLODIPINE BESYLATE 10 MG/1
10 TABLET ORAL DAILY
Qty: 90 TABLET | Refills: 3 | Status: SHIPPED | OUTPATIENT
Start: 2025-07-30

## 2025-08-04 ENCOUNTER — TELEPHONE (OUTPATIENT)
Dept: CARDIOLOGY CLINIC | Age: 75
End: 2025-08-04

## 2025-08-28 DIAGNOSIS — I10 HTN (HYPERTENSION), BENIGN: Chronic | ICD-10-CM

## 2025-08-28 DIAGNOSIS — E78.5 HYPERLIPIDEMIA WITH TARGET LDL LESS THAN 70: Chronic | ICD-10-CM

## 2025-08-28 LAB
ALBUMIN SERPL-MCNC: 4.4 G/DL (ref 3.4–5)
ALBUMIN/GLOB SERPL: 1.9 {RATIO} (ref 1.1–2.2)
ALP SERPL-CCNC: 62 U/L (ref 40–129)
ALT SERPL-CCNC: 23 U/L (ref 10–40)
ANION GAP SERPL CALCULATED.3IONS-SCNC: 10 MMOL/L (ref 3–16)
AST SERPL-CCNC: 20 U/L (ref 15–37)
BILIRUB SERPL-MCNC: 0.8 MG/DL (ref 0–1)
BUN SERPL-MCNC: 10 MG/DL (ref 7–20)
CALCIUM SERPL-MCNC: 9.6 MG/DL (ref 8.3–10.6)
CHLORIDE SERPL-SCNC: 104 MMOL/L (ref 99–110)
CO2 SERPL-SCNC: 25 MMOL/L (ref 21–32)
CREAT SERPL-MCNC: 1.3 MG/DL (ref 0.8–1.3)
GFR SERPLBLD CREATININE-BSD FMLA CKD-EPI: 57 ML/MIN/{1.73_M2}
GLUCOSE SERPL-MCNC: 109 MG/DL (ref 70–99)
POTASSIUM SERPL-SCNC: 3.6 MMOL/L (ref 3.5–5.1)
PROT SERPL-MCNC: 6.7 G/DL (ref 6.4–8.2)
SODIUM SERPL-SCNC: 139 MMOL/L (ref 136–145)

## (undated) DEVICE — SURGICAL SET UP - SURE SET: Brand: MEDLINE INDUSTRIES, INC.

## (undated) DEVICE — ADHESIVE SKIN CLSR 0.7ML TOP DERMBND ADV

## (undated) DEVICE — SURE SET-DOUBLE BASIN-LF: Brand: MEDLINE INDUSTRIES, INC.

## (undated) DEVICE — SUTURE PERMA-HAND SZ 2-0 L30IN NONABSORBABLE BLK L26MM SH K833H

## (undated) DEVICE — Device

## (undated) DEVICE — SUTURE VCRL SZ 3-0 L18IN ABSRB UD L26MM SH 1/2 CIR J864D

## (undated) DEVICE — GARMENT,MEDLINE,DVT,INT,CALF,MED, GEN2: Brand: MEDLINE

## (undated) DEVICE — 3M™ IOBAN™ 2 ANTIMICROBIAL INCISE DRAPE 6648EZ: Brand: IOBAN™ 2

## (undated) DEVICE — SUTURE PROL SZ 0 L30IN NONABSORBABLE BLU L26MM CT-2 1/2 CIR 8412H

## (undated) DEVICE — LAPAROSCOPIC ACCESS SYSTEM: Brand: ALEXIS LAPAROSCOPIC SYSTEM WITH KII FIOS FIRST ENTRY

## (undated) DEVICE — PUMP SUC IRR TBNG L10FT W/ HNDPC ASSEMB STRYKEFLOW 2

## (undated) DEVICE — SOLUTION IV IRRIG WATER 500ML POUR BRL ST 2F7113

## (undated) DEVICE — GOWN,SIRUS,POLYRNF,BRTHSLV,XL,30/CS: Brand: MEDLINE

## (undated) DEVICE — COVER LT HNDL BLU PLAS

## (undated) DEVICE — NEEDLE HYPO 22GA L1 1/2IN PIVOTING SHLD FOR LUERLOCK SYR

## (undated) DEVICE — DRAPE,LAP,CHOLE,W/TROUGHS,STERILE: Brand: MEDLINE

## (undated) DEVICE — ELECTROSURGICAL PENCIL ROCKER SWITCH NON COATED BLADE ELECTRODE 10 FT (3 M) CORD HOLSTER: Brand: MEGADYNE

## (undated) DEVICE — GLOVE SURG SZ 7 CRM LTX FREE POLYISOPRENE POLYMER BEAD ANTI

## (undated) DEVICE — BW-412T DISP COMBO CLEANING BRUSH: Brand: SINGLE USE COMBINATION CLEANING BRUSH

## (undated) DEVICE — BOWL MED L 32OZ PLAS W/ MOLD GRAD EZ OPN PEEL PCH

## (undated) DEVICE — PACK LAP IV REINF TBL CVR ADH UTIL UNDERBUTTOCK DRP W CUF

## (undated) DEVICE — SYRINGE CATH TIP 50ML

## (undated) DEVICE — AGENT HEMSTAT 3GM OXIDIZED REGENERATED CELOS ABSRB FOR CONT (ORDER MULTIPLES OF 5EA)

## (undated) DEVICE — RELOAD STPL L75MM OPN H3.8MM CLS 1.5MM WIRE DIA0.2MM REG

## (undated) DEVICE — SUTURE BOOT: Brand: DEROYAL

## (undated) DEVICE — TRAY PREP DRY W/ PREM GLV 2 APPL 6 SPNG 2 UNDPD 1 OVERWRAP

## (undated) DEVICE — SUTURE VCRL SZ 0 L54IN ABSRB VLT W/O NDL POLYGLACTIN 910 J616H

## (undated) DEVICE — SYRINGE MED 10ML LUERLOCK TIP W/O SFTY DISP

## (undated) DEVICE — STERILE PVP: Brand: MEDLINE INDUSTRIES, INC.

## (undated) DEVICE — SUTURE PDS II SZ 0 L27IN ABSRB VLT L36MM CT-1 1/2 CIR Z340H

## (undated) DEVICE — APPLICATOR MEDICATED 26 CC SOLUTION HI LT ORNG CHLORAPREP

## (undated) DEVICE — C-ARM: Brand: UNBRANDED

## (undated) DEVICE — JEWISH HOSPITAL TURNOVER KIT: Brand: MEDLINE INDUSTRIES, INC.

## (undated) DEVICE — SUTURE MCRYL SZ 4-0 L27IN ABSRB UD L19MM PS-2 1/2 CIR PRIM Y426H

## (undated) DEVICE — SYSTEM SMK EVAC LAP TBNG FILTER HSNG BENT STYL PNK SEE CLR

## (undated) DEVICE — FORCEPS BX L240CM WRK CHN 2.8MM STD CAP W/ NDL MIC MESH

## (undated) DEVICE — SYRINGE MED 10ML SLIP TIP BLNT FILL AND LUERLOCK DISP

## (undated) DEVICE — STANDARD HYPODERMIC NEEDLE,POLYPROPYLENE HUB: Brand: MONOJECT

## (undated) DEVICE — PROCEDURE KIT ENDOSCP CUST

## (undated) DEVICE — SCISSORS ENDOSCP DIA5MM CRV MPLR CAUT W/ RATCH HNDL

## (undated) DEVICE — [HIGH FLOW INSUFFLATOR,  DO NOT USE IF PACKAGE IS DAMAGED,  KEEP DRY,  KEEP AWAY FROM SUNLIGHT,  PROTECT FROM HEAT AND RADIOACTIVE SOURCES.]: Brand: PNEUMOSURE

## (undated) DEVICE — SUTURE VCRL SZ 0 L27IN ABSRB UD L36MM CT-1 1/2 CIR J260H

## (undated) DEVICE — CONTAINER,SPECIMEN,PNEU TUBE,3OZ,OR STRL: Brand: MEDLINE

## (undated) DEVICE — PLATE ES AD W 9FT CRD 2

## (undated) DEVICE — TRAY CATHETER 16FR F INCLUDE BARDX IC COMPLT CARE DRNGE BG

## (undated) DEVICE — DRAPE,T,LAPARO,TRANS,STERILE: Brand: MEDLINE

## (undated) DEVICE — SUTURE PDS II SZ 0 L60IN ABSRB VLT L48MM CTX 1/2 CIR Z990G

## (undated) DEVICE — SPONGE,LAP,18"X18",DLX,XR,ST,5/PK,40/PK: Brand: MEDLINE

## (undated) DEVICE — TOWEL,STOP FLAG GOLD N-W: Brand: MEDLINE

## (undated) DEVICE — TROCAR: Brand: KII SHIELDED BLADED ACCESS SYSTEM

## (undated) DEVICE — MEDICINE CUP, GRADUATED, STER: Brand: MEDLINE

## (undated) DEVICE — STAPLER INT L75MM CUT LN L73MM STPL LN L77MM BLU B FRM 8

## (undated) DEVICE — SEALER/DIVIDER LAP SHFT L44CM JAW APER 11.4MM 315DEG ROT

## (undated) DEVICE — TROCAR: Brand: KII FIOS FIRST ENTRY

## (undated) DEVICE — Device: Brand: SPOT EX ENDOSCOPIC TATTOO

## (undated) DEVICE — 40583 XL ADVANCED TRENDELENBURG POSITIONING KIT: Brand: 40583 XL ADVANCED TRENDELENBURG POSITIONING KIT

## (undated) DEVICE — 3M™ IOBAN™ 2 ANTIMICROBIAL INCISE DRAPE 6640EZ: Brand: IOBAN™ 2

## (undated) DEVICE — SURGICEL ENDOSCP APPL

## (undated) DEVICE — SNARES COLD OVAL 10MM THIN

## (undated) DEVICE — SUTURE VCRL SZ 2-0 L18IN ABSRB UD CT-1 L36MM 1/2 CIR J839D

## (undated) DEVICE — NEEDLE 25GAX5.0MM INJ CARR LOCKE

## (undated) DEVICE — SET VLV 3 PC AWS DISPOSABLE GRDIAN SCOPEVALET

## (undated) DEVICE — SOLUTION ANTIFOG VIS SYS CLEARIFY LAPSCP

## (undated) DEVICE — CUFF RESTRN WRST OR ANK 45FT AD FOAM

## (undated) DEVICE — SOLUTION INJ LR VISIV 1000ML BG